# Patient Record
Sex: FEMALE | Race: WHITE | Employment: FULL TIME | URBAN - METROPOLITAN AREA
[De-identification: names, ages, dates, MRNs, and addresses within clinical notes are randomized per-mention and may not be internally consistent; named-entity substitution may affect disease eponyms.]

---

## 2017-11-20 ENCOUNTER — OFFICE VISIT (OUTPATIENT)
Dept: OBGYN | Age: 47
End: 2017-11-20

## 2017-11-20 VITALS
DIASTOLIC BLOOD PRESSURE: 74 MMHG | SYSTOLIC BLOOD PRESSURE: 124 MMHG | BODY MASS INDEX: 26.98 KG/M2 | HEIGHT: 64 IN | WEIGHT: 158 LBS

## 2017-11-20 DIAGNOSIS — Z11.51 SPECIAL SCREENING EXAMINATION FOR HUMAN PAPILLOMAVIRUS (HPV): ICD-10-CM

## 2017-11-20 DIAGNOSIS — Z12.31 SCREENING MAMMOGRAM, ENCOUNTER FOR: ICD-10-CM

## 2017-11-20 DIAGNOSIS — Z01.419 WOMEN'S ANNUAL ROUTINE GYNECOLOGICAL EXAMINATION: Primary | ICD-10-CM

## 2017-11-20 PROCEDURE — G8419 CALC BMI OUT NRM PARAM NOF/U: HCPCS | Performed by: OBSTETRICS & GYNECOLOGY

## 2017-11-20 PROCEDURE — 4004F PT TOBACCO SCREEN RCVD TLK: CPT | Performed by: OBSTETRICS & GYNECOLOGY

## 2017-11-20 PROCEDURE — 99396 PREV VISIT EST AGE 40-64: CPT | Performed by: OBSTETRICS & GYNECOLOGY

## 2017-11-20 PROCEDURE — G8427 DOCREV CUR MEDS BY ELIG CLIN: HCPCS | Performed by: OBSTETRICS & GYNECOLOGY

## 2017-11-20 PROCEDURE — G8484 FLU IMMUNIZE NO ADMIN: HCPCS | Performed by: OBSTETRICS & GYNECOLOGY

## 2017-11-20 PROCEDURE — 99212 OFFICE O/P EST SF 10 MIN: CPT | Performed by: OBSTETRICS & GYNECOLOGY

## 2017-11-20 ASSESSMENT — ENCOUNTER SYMPTOMS
RECTAL PAIN: 0
DIARRHEA: 0
RESPIRATORY NEGATIVE: 1
ABDOMINAL DISTENTION: 0
EYES NEGATIVE: 1
ALLERGIC/IMMUNOLOGIC NEGATIVE: 1
NAUSEA: 0
VOMITING: 0
ABDOMINAL PAIN: 0
CONSTIPATION: 0
ANAL BLEEDING: 0
BLOOD IN STOOL: 0

## 2017-11-20 NOTE — PROGRESS NOTES
The patient was asked if she would like a chaperone present for her intimate exam. She  declines the chaperone.  Hardik

## 2017-11-21 NOTE — PROGRESS NOTES
Subjective:      Patient ID: Mikey Godfrey is a 52 y.o. female    Annual exam.  No GYN complaints. Normal cycles for perimenopause. Pap performed and screening mammogram ordered. STD screening offered. Monthly SBE encouraged. F/U annual or prn. Vitals:  /74   Ht 5' 4\" (1.626 m)   Wt 158 lb (71.7 kg)   LMP 2017   BMI 27.12 kg/m²   Past Medical History:   Diagnosis Date    Diverticular disease      Past Surgical History:   Procedure Laterality Date    DILATION AND CURETTAGE       Allergies:  Sulfa antibiotics  No current outpatient prescriptions on file. No current facility-administered medications for this visit. Social History     Social History    Marital status: Single     Spouse name: N/A    Number of children: N/A    Years of education: N/A     Occupational History    Not on file. Social History Main Topics    Smoking status: Current Every Day Smoker    Smokeless tobacco: Never Used    Alcohol use Yes      Comment: socially    Drug use: No    Sexual activity: Not Currently     Other Topics Concern    Not on file     Social History Narrative    No narrative on file     Family History   Problem Relation Age of Onset    No Known Problems Father     No Known Problems Mother     No Known Problems Paternal Grandfather     No Known Problems Paternal Grandmother     No Known Problems Maternal Grandmother     No Known Problems Maternal Grandfather     No Known Problems Brother     No Known Problems Sister     No Known Problems Other     Breast Cancer Neg Hx     Cancer Neg Hx     Colon Cancer Neg Hx     Diabetes Neg Hx     Eclampsia Neg Hx     Hypertension Neg Hx     Ovarian Cancer Neg Hx      Labor Neg Hx     Spont Abortions Neg Hx     Stroke Neg Hx        Review of Systems   Constitutional: Negative. Negative for activity change, appetite change, chills, diaphoresis, fatigue, fever and unexpected weight change. HENT: Negative.     Eyes: Negative. Respiratory: Negative. Cardiovascular: Negative. Gastrointestinal: Negative for abdominal distention, abdominal pain, anal bleeding, blood in stool, constipation, diarrhea, nausea, rectal pain and vomiting. Endocrine: Negative. Genitourinary: Negative for decreased urine volume, difficulty urinating, dyspareunia, dysuria, enuresis, flank pain, frequency, genital sores, hematuria, menstrual problem, pelvic pain, urgency, vaginal bleeding, vaginal discharge and vaginal pain. Musculoskeletal: Negative. Skin: Negative. Allergic/Immunologic: Negative. Neurological: Negative. Hematological: Negative. Psychiatric/Behavioral: Negative. Objective:     Physical Exam   Constitutional: She is oriented to person, place, and time. She appears well-developed and well-nourished. HENT:   Head: Normocephalic. Neck: Normal range of motion. Neck supple. No thyromegaly present. Cardiovascular: Normal rate, regular rhythm and normal heart sounds. Pulmonary/Chest: Effort normal and breath sounds normal. No respiratory distress. She has no wheezes. She has no rales. She exhibits no tenderness. Abdominal: Soft. Bowel sounds are normal. She exhibits no distension and no mass. There is no tenderness. There is no rebound and no guarding. Hernia confirmed negative in the right inguinal area and confirmed negative in the left inguinal area. Genitourinary: Rectum normal, vagina normal and uterus normal. No breast swelling, tenderness, discharge or bleeding. No labial fusion. There is no rash, tenderness, lesion or injury on the right labia. There is no rash, tenderness, lesion or injury on the left labia. Uterus is not deviated, not enlarged, not fixed and not tender. Cervix exhibits no motion tenderness, no discharge and no friability. Right adnexum displays no mass, no tenderness and no fullness. Left adnexum displays no mass, no tenderness and no fullness.  No erythema, tenderness or bleeding in the vagina. No foreign body in the vagina. No signs of injury around the vagina. No vaginal discharge found. Musculoskeletal: Normal range of motion. She exhibits no edema or tenderness. Lymphadenopathy:     She has no cervical adenopathy. Right: No inguinal adenopathy present. Left: No inguinal adenopathy present. Neurological: She is alert and oriented to person, place, and time. Skin: Skin is warm and dry. No rash noted. No erythema. No pallor. Psychiatric: She has a normal mood and affect. Her behavior is normal. Judgment and thought content normal.       Assessment:      1. Women's annual routine gynecological examination  PAP SMEAR   2. Special screening examination for human papillomavirus (HPV)  PAP SMEAR   3. Screening mammogram, encounter for  KARLA DIGITAL SCREEN W OR WO CAD BILATERAL         Plan:      Medications placed this encounter:  No orders of the defined types were placed in this encounter. Orders placed this encounter:  Orders Placed This Encounter   Procedures    KARLA DIGITAL SCREEN W OR WO CAD BILATERAL     Standing Status:   Future     Standing Expiration Date:   1/20/2019    PAP SMEAR     Standing Status:   Future     Standing Expiration Date:   11/20/2018     Order Specific Question:   Collection Type     Answer:   SurePath     Order Specific Question:   Prior Abnormal Pap Test     Answer:   No     Order Specific Question:   Screening or Diagnostic     Answer:   Screening     Order Specific Question:   HPV Requested? Answer:   HPV 16/18     Order Specific Question:   High Risk Patient     Answer:   N/A         Follow up:  1 year annual or prn.

## 2017-11-29 ENCOUNTER — HOSPITAL ENCOUNTER (OUTPATIENT)
Dept: WOMENS IMAGING | Age: 47
Discharge: HOME | End: 2017-11-29
Payer: COMMERCIAL

## 2017-11-29 DIAGNOSIS — Z12.31 SCREENING MAMMOGRAM, ENCOUNTER FOR: ICD-10-CM

## 2017-11-29 PROCEDURE — G0202 SCR MAMMO BI INCL CAD: HCPCS

## 2017-11-30 DIAGNOSIS — Z11.51 SPECIAL SCREENING EXAMINATION FOR HUMAN PAPILLOMAVIRUS (HPV): ICD-10-CM

## 2017-11-30 DIAGNOSIS — Z01.419 WOMEN'S ANNUAL ROUTINE GYNECOLOGICAL EXAMINATION: ICD-10-CM

## 2017-12-01 ENCOUNTER — TELEPHONE (OUTPATIENT)
Dept: OBGYN | Age: 47
End: 2017-12-01

## 2017-12-05 ENCOUNTER — OFFICE VISIT (OUTPATIENT)
Dept: OBGYN | Age: 47
End: 2017-12-05

## 2017-12-05 VITALS
SYSTOLIC BLOOD PRESSURE: 128 MMHG | BODY MASS INDEX: 26.63 KG/M2 | DIASTOLIC BLOOD PRESSURE: 72 MMHG | WEIGHT: 156 LBS | HEIGHT: 64 IN

## 2017-12-05 DIAGNOSIS — R87.618 UNEXPLAINED ENDOMETRIAL CELLS ON CERVICAL PAP SMEAR: Primary | ICD-10-CM

## 2017-12-05 PROCEDURE — 99213 OFFICE O/P EST LOW 20 MIN: CPT | Performed by: OBSTETRICS & GYNECOLOGY

## 2017-12-05 PROCEDURE — 4004F PT TOBACCO SCREEN RCVD TLK: CPT | Performed by: OBSTETRICS & GYNECOLOGY

## 2017-12-05 PROCEDURE — G8484 FLU IMMUNIZE NO ADMIN: HCPCS | Performed by: OBSTETRICS & GYNECOLOGY

## 2017-12-05 PROCEDURE — G8419 CALC BMI OUT NRM PARAM NOF/U: HCPCS | Performed by: OBSTETRICS & GYNECOLOGY

## 2017-12-05 PROCEDURE — G8428 CUR MEDS NOT DOCUMENT: HCPCS | Performed by: OBSTETRICS & GYNECOLOGY

## 2017-12-05 NOTE — PATIENT INSTRUCTIONS
ENDOSEE - OFFICE HYSTEROSCOPY    Endosee Office Hysteroscopy is a diagnostic tool that lets your doctor see the inside of your uterus quickly and easily right in her office, without the need for general anesthesia in an operating room. Frandy Chin helps find the cause of several common issues women face such as abnormal bleeding and infertility concerns. Before using Endosee, your doctor will need to look at your health history, but most women are able to have the procedure without a problem. Do I need to do anything prior to my Endosee? 1. No unprotected intercourse for 3 weeks prior to procedure  2. Take 800mg of Motrin 1 hour prior to your procedure  3. If you start your period, you can still have the Endosee done if your bleeding is very light. If your cycle is heavy please call to reschedule. 4.  Avoid vaginal medications or creams & douching for 24 hours before the procedure. 5. Please come prepared to leave a urine sample prior to your procedure. What can I expect when I go home? 1. You may have some spotting or light discharge for up to 24 hours. 2.  You can resume all normal activities. 3.  The procedure may start your period. *If a biopsy is done, you will need to schedule a follow up appointment for 2 weeks following your procedure to get your results from the physician.

## 2017-12-06 ASSESSMENT — ENCOUNTER SYMPTOMS
EYES NEGATIVE: 1
VOMITING: 0
ALLERGIC/IMMUNOLOGIC NEGATIVE: 1
NAUSEA: 0
BLOOD IN STOOL: 0
ABDOMINAL PAIN: 0
DIARRHEA: 0
ABDOMINAL DISTENTION: 0
RESPIRATORY NEGATIVE: 1
CONSTIPATION: 0
RECTAL PAIN: 0
ANAL BLEEDING: 0

## 2017-12-06 NOTE — PROGRESS NOTES
 Hypertension Neg Hx     Ovarian Cancer Neg Hx      Labor Neg Hx     Spont Abortions Neg Hx     Stroke Neg Hx        Review of Systems   Constitutional: Negative. Negative for activity change, appetite change, chills, diaphoresis, fatigue, fever and unexpected weight change. HENT: Negative. Eyes: Negative. Respiratory: Negative. Cardiovascular: Negative. Gastrointestinal: Negative for abdominal distention, abdominal pain, anal bleeding, blood in stool, constipation, diarrhea, nausea, rectal pain and vomiting. Endocrine: Negative. Genitourinary: Negative for decreased urine volume, difficulty urinating, dyspareunia, dysuria, enuresis, flank pain, frequency, genital sores, hematuria, menstrual problem, pelvic pain, urgency, vaginal bleeding, vaginal discharge and vaginal pain. Musculoskeletal: Negative. Skin: Negative. Allergic/Immunologic: Negative. Neurological: Negative. Hematological: Negative. Psychiatric/Behavioral: Negative. Objective:     Physical Exam   Constitutional: She is oriented to person, place, and time. She appears well-developed and well-nourished. No distress. HENT:   Head: Normocephalic and atraumatic. Eyes: Conjunctivae are normal.   Neck: Normal range of motion. Neck supple. Cardiovascular: Normal rate and regular rhythm. Pulmonary/Chest: Effort normal. No respiratory distress. Musculoskeletal: Normal range of motion. She exhibits no edema, tenderness or deformity. Neurological: She is alert and oriented to person, place, and time. She exhibits normal muscle tone. Coordination normal.   Skin: Skin is warm and dry. She is not diaphoretic. No pallor. Psychiatric: She has a normal mood and affect. Her behavior is normal. Judgment and thought content normal.       Assessment:      1.  Unexplained endometrial cells on cervical Pap smear           Plan:      Medications placed this encounter:  No orders of the defined types were placed in this encounter. Orders placed this encounter:  No orders of the defined types were placed in this encounter. Follow up:  Return for Endosee w/ bx.

## 2018-02-05 ENCOUNTER — PROCEDURE VISIT (OUTPATIENT)
Dept: OBGYN CLINIC | Age: 48
End: 2018-02-05
Payer: COMMERCIAL

## 2018-02-05 VITALS
BODY MASS INDEX: 27.14 KG/M2 | DIASTOLIC BLOOD PRESSURE: 72 MMHG | HEIGHT: 64 IN | SYSTOLIC BLOOD PRESSURE: 128 MMHG | WEIGHT: 159 LBS

## 2018-02-05 DIAGNOSIS — R87.618 UNEXPLAINED ENDOMETRIAL CELLS ON CERVICAL PAP SMEAR: Primary | ICD-10-CM

## 2018-02-05 LAB
CONTROL: NORMAL
PREGNANCY TEST URINE, POC: NORMAL

## 2018-02-05 PROCEDURE — 58558 HYSTEROSCOPY BIOPSY: CPT | Performed by: OBSTETRICS & GYNECOLOGY

## 2018-02-05 PROCEDURE — 81025 URINE PREGNANCY TEST: CPT | Performed by: OBSTETRICS & GYNECOLOGY

## 2018-02-05 PROCEDURE — 99999 PR OFFICE/OUTPT VISIT,PROCEDURE ONLY: CPT | Performed by: OBSTETRICS & GYNECOLOGY

## 2018-02-05 ASSESSMENT — PATIENT HEALTH QUESTIONNAIRE - PHQ9
SUM OF ALL RESPONSES TO PHQ QUESTIONS 1-9: 0
SUM OF ALL RESPONSES TO PHQ9 QUESTIONS 1 & 2: 0
1. LITTLE INTEREST OR PLEASURE IN DOING THINGS: 0
2. FEELING DOWN, DEPRESSED OR HOPELESS: 0

## 2018-02-20 ENCOUNTER — OFFICE VISIT (OUTPATIENT)
Dept: OBGYN CLINIC | Age: 48
End: 2018-02-20
Payer: COMMERCIAL

## 2018-02-20 VITALS — BODY MASS INDEX: 26.95 KG/M2 | WEIGHT: 157 LBS | SYSTOLIC BLOOD PRESSURE: 122 MMHG | DIASTOLIC BLOOD PRESSURE: 68 MMHG

## 2018-02-20 DIAGNOSIS — Z09 FOLLOW UP: ICD-10-CM

## 2018-02-20 DIAGNOSIS — R87.619 ENDOMETRIAL CELLS ON CERVICAL PAP SMEAR INCONSISTENT W/LMP: Primary | ICD-10-CM

## 2018-02-20 PROCEDURE — 4004F PT TOBACCO SCREEN RCVD TLK: CPT | Performed by: OBSTETRICS & GYNECOLOGY

## 2018-02-20 PROCEDURE — G8484 FLU IMMUNIZE NO ADMIN: HCPCS | Performed by: OBSTETRICS & GYNECOLOGY

## 2018-02-20 PROCEDURE — G8419 CALC BMI OUT NRM PARAM NOF/U: HCPCS | Performed by: OBSTETRICS & GYNECOLOGY

## 2018-02-20 PROCEDURE — G8427 DOCREV CUR MEDS BY ELIG CLIN: HCPCS | Performed by: OBSTETRICS & GYNECOLOGY

## 2018-02-20 PROCEDURE — 99213 OFFICE O/P EST LOW 20 MIN: CPT | Performed by: OBSTETRICS & GYNECOLOGY

## 2018-02-20 ASSESSMENT — ENCOUNTER SYMPTOMS
ABDOMINAL PAIN: 0
RESPIRATORY NEGATIVE: 1
DIARRHEA: 0
ANAL BLEEDING: 0
VOMITING: 0
CONSTIPATION: 0
RECTAL PAIN: 0
ABDOMINAL DISTENTION: 0
BLOOD IN STOOL: 0
NAUSEA: 0
ALLERGIC/IMMUNOLOGIC NEGATIVE: 1
EYES NEGATIVE: 1

## 2018-02-21 NOTE — PROGRESS NOTES
activity change, appetite change, chills, diaphoresis, fatigue, fever and unexpected weight change. HENT: Negative. Eyes: Negative. Respiratory: Negative. Cardiovascular: Negative. Gastrointestinal: Negative for abdominal distention, abdominal pain, anal bleeding, blood in stool, constipation, diarrhea, nausea, rectal pain and vomiting. Endocrine: Negative. Genitourinary: Negative for decreased urine volume, difficulty urinating, dyspareunia, dysuria, enuresis, flank pain, frequency, genital sores, hematuria, menstrual problem, pelvic pain, urgency, vaginal bleeding, vaginal discharge and vaginal pain. Musculoskeletal: Negative. Skin: Negative. Allergic/Immunologic: Negative. Neurological: Negative. Hematological: Negative. Psychiatric/Behavioral: Negative. Objective:     Physical Exam   Constitutional: She is oriented to person, place, and time. She appears well-developed and well-nourished. No distress. HENT:   Head: Normocephalic and atraumatic. Eyes: Conjunctivae are normal.   Neck: Normal range of motion. Neck supple. Cardiovascular: Normal rate and regular rhythm. Pulmonary/Chest: Effort normal. No respiratory distress. Musculoskeletal: Normal range of motion. She exhibits no edema, tenderness or deformity. Neurological: She is alert and oriented to person, place, and time. She exhibits normal muscle tone. Coordination normal.   Skin: Skin is warm and dry. She is not diaphoretic. No pallor. Psychiatric: She has a normal mood and affect. Her behavior is normal. Judgment and thought content normal.       Assessment:      1. Endometrial cells on cervical Pap smear inconsistent w/LMP     2. Follow up           Plan:      Medications placed this encounter:  No orders of the defined types were placed in this encounter. Orders placed this encounter:  No orders of the defined types were placed in this encounter.         Follow up:  Return in about 1 year

## 2018-11-26 ENCOUNTER — OFFICE VISIT (OUTPATIENT)
Dept: OBGYN CLINIC | Age: 48
End: 2018-11-26
Payer: COMMERCIAL

## 2018-11-26 VITALS
DIASTOLIC BLOOD PRESSURE: 78 MMHG | HEIGHT: 64 IN | SYSTOLIC BLOOD PRESSURE: 120 MMHG | WEIGHT: 153 LBS | BODY MASS INDEX: 26.12 KG/M2

## 2018-11-26 DIAGNOSIS — Z12.31 SCREENING MAMMOGRAM, ENCOUNTER FOR: ICD-10-CM

## 2018-11-26 DIAGNOSIS — Z11.51 SPECIAL SCREENING EXAMINATION FOR HUMAN PAPILLOMAVIRUS (HPV): ICD-10-CM

## 2018-11-26 DIAGNOSIS — Z01.419 WOMEN'S ANNUAL ROUTINE GYNECOLOGICAL EXAMINATION: Primary | ICD-10-CM

## 2018-11-26 DIAGNOSIS — N92.1 METRORRHAGIA: ICD-10-CM

## 2018-11-26 PROCEDURE — 99396 PREV VISIT EST AGE 40-64: CPT | Performed by: OBSTETRICS & GYNECOLOGY

## 2018-11-26 PROCEDURE — G8484 FLU IMMUNIZE NO ADMIN: HCPCS | Performed by: OBSTETRICS & GYNECOLOGY

## 2018-11-26 PROCEDURE — G8427 DOCREV CUR MEDS BY ELIG CLIN: HCPCS | Performed by: OBSTETRICS & GYNECOLOGY

## 2018-11-26 PROCEDURE — 99213 OFFICE O/P EST LOW 20 MIN: CPT | Performed by: OBSTETRICS & GYNECOLOGY

## 2018-11-26 PROCEDURE — 4004F PT TOBACCO SCREEN RCVD TLK: CPT | Performed by: OBSTETRICS & GYNECOLOGY

## 2018-11-26 PROCEDURE — G8419 CALC BMI OUT NRM PARAM NOF/U: HCPCS | Performed by: OBSTETRICS & GYNECOLOGY

## 2018-11-26 ASSESSMENT — ENCOUNTER SYMPTOMS
RECTAL PAIN: 0
CONSTIPATION: 0
RESPIRATORY NEGATIVE: 1
ALLERGIC/IMMUNOLOGIC NEGATIVE: 1
ABDOMINAL DISTENTION: 0
VOMITING: 0
ANAL BLEEDING: 0
BLOOD IN STOOL: 0
EYES NEGATIVE: 1
NAUSEA: 0
DIARRHEA: 0
ABDOMINAL PAIN: 0

## 2018-11-26 NOTE — PROGRESS NOTES
Diabetes Neg Hx     Eclampsia Neg Hx     Hypertension Neg Hx     Ovarian Cancer Neg Hx      Labor Neg Hx     Spont Abortions Neg Hx     Stroke Neg Hx        Review of Systems   Constitutional: Negative. Negative for activity change, appetite change, chills, diaphoresis, fatigue, fever and unexpected weight change. HENT: Negative. Eyes: Negative. Respiratory: Negative. Cardiovascular: Negative. Gastrointestinal: Negative for abdominal distention, abdominal pain, anal bleeding, blood in stool, constipation, diarrhea, nausea, rectal pain and vomiting. Endocrine: Negative. Genitourinary: Positive for menstrual problem (Irregular cycle). Negative for decreased urine volume, difficulty urinating, dyspareunia, dysuria, enuresis, flank pain, frequency, genital sores, hematuria, pelvic pain, urgency, vaginal bleeding, vaginal discharge and vaginal pain. Musculoskeletal: Negative. Skin: Negative. Allergic/Immunologic: Negative. Neurological: Negative. Hematological: Negative. Psychiatric/Behavioral: Negative. Objective:     Physical Exam   Constitutional: She is oriented to person, place, and time. She appears well-developed and well-nourished. HENT:   Head: Normocephalic. Neck: Normal range of motion. Neck supple. No thyromegaly present. Cardiovascular: Normal rate, regular rhythm and normal heart sounds. Pulmonary/Chest: Effort normal and breath sounds normal. No respiratory distress. She has no wheezes. She has no rales. She exhibits no tenderness. Abdominal: Soft. Bowel sounds are normal. She exhibits no distension and no mass. There is no tenderness. There is no rebound and no guarding. Hernia confirmed negative in the right inguinal area and confirmed negative in the left inguinal area. Genitourinary: Rectum normal, vagina normal and uterus normal. No breast tenderness, discharge or bleeding. No labial fusion.  There is no rash, tenderness, lesion or

## 2018-12-03 DIAGNOSIS — Z01.419 WOMEN'S ANNUAL ROUTINE GYNECOLOGICAL EXAMINATION: ICD-10-CM

## 2018-12-03 DIAGNOSIS — Z11.51 SPECIAL SCREENING EXAMINATION FOR HUMAN PAPILLOMAVIRUS (HPV): ICD-10-CM

## 2018-12-20 ENCOUNTER — OFFICE VISIT (OUTPATIENT)
Dept: FAMILY MEDICINE CLINIC | Age: 48
End: 2018-12-20
Payer: COMMERCIAL

## 2018-12-20 VITALS
TEMPERATURE: 97.5 F | WEIGHT: 151.6 LBS | SYSTOLIC BLOOD PRESSURE: 138 MMHG | HEIGHT: 64 IN | RESPIRATION RATE: 14 BRPM | OXYGEN SATURATION: 99 % | HEART RATE: 91 BPM | DIASTOLIC BLOOD PRESSURE: 82 MMHG | BODY MASS INDEX: 25.88 KG/M2

## 2018-12-20 DIAGNOSIS — J01.00 ACUTE NON-RECURRENT MAXILLARY SINUSITIS: Primary | ICD-10-CM

## 2018-12-20 DIAGNOSIS — R06.2 EXPIRATORY WHEEZING: ICD-10-CM

## 2018-12-20 DIAGNOSIS — R05.9 COUGH: ICD-10-CM

## 2018-12-20 DIAGNOSIS — R09.82 PND (POST-NASAL DRIP): ICD-10-CM

## 2018-12-20 PROCEDURE — G8419 CALC BMI OUT NRM PARAM NOF/U: HCPCS | Performed by: PHYSICIAN ASSISTANT

## 2018-12-20 PROCEDURE — G8484 FLU IMMUNIZE NO ADMIN: HCPCS | Performed by: PHYSICIAN ASSISTANT

## 2018-12-20 PROCEDURE — 99213 OFFICE O/P EST LOW 20 MIN: CPT | Performed by: PHYSICIAN ASSISTANT

## 2018-12-20 PROCEDURE — G8427 DOCREV CUR MEDS BY ELIG CLIN: HCPCS | Performed by: PHYSICIAN ASSISTANT

## 2018-12-20 PROCEDURE — 4004F PT TOBACCO SCREEN RCVD TLK: CPT | Performed by: PHYSICIAN ASSISTANT

## 2018-12-20 RX ORDER — AMOXICILLIN AND CLAVULANATE POTASSIUM 875; 125 MG/1; MG/1
1 TABLET, FILM COATED ORAL 2 TIMES DAILY
Qty: 20 TABLET | Refills: 0 | Status: SHIPPED | OUTPATIENT
Start: 2018-12-20 | End: 2018-12-30

## 2018-12-20 RX ORDER — PREDNISONE 20 MG/1
40 TABLET ORAL DAILY
Qty: 10 TABLET | Refills: 0 | Status: SHIPPED | OUTPATIENT
Start: 2018-12-20 | End: 2018-12-25

## 2018-12-20 RX ORDER — ALBUTEROL SULFATE 90 UG/1
2 AEROSOL, METERED RESPIRATORY (INHALATION) 2 TIMES DAILY
Qty: 2 INHALER | Refills: 0 | Status: SHIPPED | OUTPATIENT
Start: 2018-12-20 | End: 2019-03-21

## 2018-12-20 RX ORDER — GUAIFENESIN 600 MG/1
600 TABLET, EXTENDED RELEASE ORAL 2 TIMES DAILY
Qty: 30 TABLET | Refills: 0 | Status: SHIPPED | OUTPATIENT
Start: 2018-12-20 | End: 2019-01-04

## 2018-12-21 ASSESSMENT — ENCOUNTER SYMPTOMS
SINUS PRESSURE: 1
FACIAL SWELLING: 0
SINUS PAIN: 1
COUGH: 1
WHEEZING: 1
CHEST TIGHTNESS: 1
TROUBLE SWALLOWING: 0

## 2019-01-14 ENCOUNTER — HOSPITAL ENCOUNTER (OUTPATIENT)
Dept: WOMENS IMAGING | Age: 49
Discharge: HOME | End: 2019-01-16
Payer: COMMERCIAL

## 2019-01-14 DIAGNOSIS — Z12.31 SCREENING MAMMOGRAM, ENCOUNTER FOR: ICD-10-CM

## 2019-01-14 PROCEDURE — 77067 SCR MAMMO BI INCL CAD: CPT

## 2019-03-21 ENCOUNTER — OFFICE VISIT (OUTPATIENT)
Dept: FAMILY MEDICINE CLINIC | Age: 49
End: 2019-03-21
Payer: COMMERCIAL

## 2019-03-21 VITALS
HEART RATE: 99 BPM | RESPIRATION RATE: 14 BRPM | HEIGHT: 64 IN | BODY MASS INDEX: 23.9 KG/M2 | WEIGHT: 140 LBS | OXYGEN SATURATION: 100 % | TEMPERATURE: 98 F | DIASTOLIC BLOOD PRESSURE: 88 MMHG | SYSTOLIC BLOOD PRESSURE: 138 MMHG

## 2019-03-21 DIAGNOSIS — Z72.0 TOBACCO ABUSE: ICD-10-CM

## 2019-03-21 DIAGNOSIS — R10.84 GENERALIZED ABDOMINAL PAIN: ICD-10-CM

## 2019-03-21 DIAGNOSIS — K29.00 ACUTE GASTRITIS, PRESENCE OF BLEEDING UNSPECIFIED, UNSPECIFIED GASTRITIS TYPE: ICD-10-CM

## 2019-03-21 DIAGNOSIS — N93.8 DUB (DYSFUNCTIONAL UTERINE BLEEDING): ICD-10-CM

## 2019-03-21 DIAGNOSIS — Z13.220 SCREENING FOR HYPERLIPIDEMIA: ICD-10-CM

## 2019-03-21 DIAGNOSIS — D25.9 UTERINE LEIOMYOMA, UNSPECIFIED LOCATION: ICD-10-CM

## 2019-03-21 DIAGNOSIS — R21 RASH AND NONSPECIFIC SKIN ERUPTION: ICD-10-CM

## 2019-03-21 DIAGNOSIS — N92.6 IRREGULAR PERIODS: Primary | ICD-10-CM

## 2019-03-21 PROCEDURE — G8427 DOCREV CUR MEDS BY ELIG CLIN: HCPCS | Performed by: PHYSICIAN ASSISTANT

## 2019-03-21 PROCEDURE — 99215 OFFICE O/P EST HI 40 MIN: CPT | Performed by: PHYSICIAN ASSISTANT

## 2019-03-21 PROCEDURE — G8484 FLU IMMUNIZE NO ADMIN: HCPCS | Performed by: PHYSICIAN ASSISTANT

## 2019-03-21 PROCEDURE — 4004F PT TOBACCO SCREEN RCVD TLK: CPT | Performed by: PHYSICIAN ASSISTANT

## 2019-03-21 PROCEDURE — G8420 CALC BMI NORM PARAMETERS: HCPCS | Performed by: PHYSICIAN ASSISTANT

## 2019-03-21 RX ORDER — CLOTRIMAZOLE AND BETAMETHASONE DIPROPIONATE 10; .64 MG/G; MG/G
CREAM TOPICAL
Qty: 45 G | Refills: 0 | Status: SHIPPED | OUTPATIENT
Start: 2019-03-21 | End: 2021-01-14

## 2019-03-21 RX ORDER — ONDANSETRON 4 MG/1
TABLET, FILM COATED ORAL
Refills: 0 | COMMUNITY
Start: 2019-01-28 | End: 2019-03-21

## 2019-03-21 RX ORDER — SUCRALFATE 1 G/1
1 TABLET ORAL 4 TIMES DAILY
Qty: 120 TABLET | Refills: 3 | Status: SHIPPED | OUTPATIENT
Start: 2019-03-21 | End: 2020-01-13 | Stop reason: CLARIF

## 2019-03-21 ASSESSMENT — ENCOUNTER SYMPTOMS
BLOOD IN STOOL: 0
SINUS PAIN: 0
TROUBLE SWALLOWING: 0
COUGH: 0
NAUSEA: 1
VOMITING: 0
RECTAL PAIN: 0
SINUS PRESSURE: 0
WHEEZING: 0
CHEST TIGHTNESS: 0
ABDOMINAL PAIN: 1
SHORTNESS OF BREATH: 0
DIARRHEA: 0
ANAL BLEEDING: 0
CONSTIPATION: 0
ABDOMINAL DISTENTION: 1
BACK PAIN: 0

## 2019-03-21 ASSESSMENT — PATIENT HEALTH QUESTIONNAIRE - PHQ9
SUM OF ALL RESPONSES TO PHQ9 QUESTIONS 1 & 2: 0
SUM OF ALL RESPONSES TO PHQ QUESTIONS 1-9: 0
2. FEELING DOWN, DEPRESSED OR HOPELESS: 0
1. LITTLE INTEREST OR PLEASURE IN DOING THINGS: 0
SUM OF ALL RESPONSES TO PHQ QUESTIONS 1-9: 0

## 2019-04-01 ENCOUNTER — TELEPHONE (OUTPATIENT)
Dept: FAMILY MEDICINE CLINIC | Age: 49
End: 2019-04-01

## 2019-04-01 DIAGNOSIS — D25.9 UTERINE LEIOMYOMA, UNSPECIFIED LOCATION: ICD-10-CM

## 2019-04-01 DIAGNOSIS — N92.6 IRREGULAR PERIODS: Primary | ICD-10-CM

## 2019-04-01 NOTE — TELEPHONE ENCOUNTER
Patient called to advise that her ultrasound was cancelled. She was told that there was something wrong with the order. I called scheduling and Robert Brewster is the one who cancelled the order and she will call back to advise why.

## 2019-04-07 ENCOUNTER — HOSPITAL ENCOUNTER (OUTPATIENT)
Dept: ULTRASOUND IMAGING | Age: 49
Discharge: HOME | End: 2019-04-09
Payer: COMMERCIAL

## 2019-04-07 DIAGNOSIS — N92.6 IRREGULAR PERIODS: ICD-10-CM

## 2019-04-07 DIAGNOSIS — D25.9 UTERINE LEIOMYOMA, UNSPECIFIED LOCATION: ICD-10-CM

## 2019-04-07 DIAGNOSIS — N93.8 DUB (DYSFUNCTIONAL UTERINE BLEEDING): ICD-10-CM

## 2019-04-07 PROCEDURE — 76856 US EXAM PELVIC COMPLETE: CPT

## 2019-04-07 PROCEDURE — 76830 TRANSVAGINAL US NON-OB: CPT

## 2019-04-17 ENCOUNTER — OFFICE VISIT (OUTPATIENT)
Dept: OBGYN CLINIC | Age: 49
End: 2019-04-17
Payer: COMMERCIAL

## 2019-04-17 VITALS
SYSTOLIC BLOOD PRESSURE: 134 MMHG | DIASTOLIC BLOOD PRESSURE: 84 MMHG | BODY MASS INDEX: 23.56 KG/M2 | HEIGHT: 64 IN | WEIGHT: 138 LBS

## 2019-04-17 DIAGNOSIS — N92.6 IRREGULAR MENSTRUAL CYCLE: ICD-10-CM

## 2019-04-17 DIAGNOSIS — D25.9 UTERINE LEIOMYOMA, UNSPECIFIED LOCATION: Primary | ICD-10-CM

## 2019-04-17 PROCEDURE — 4004F PT TOBACCO SCREEN RCVD TLK: CPT | Performed by: OBSTETRICS & GYNECOLOGY

## 2019-04-17 PROCEDURE — G8420 CALC BMI NORM PARAMETERS: HCPCS | Performed by: OBSTETRICS & GYNECOLOGY

## 2019-04-17 PROCEDURE — 99213 OFFICE O/P EST LOW 20 MIN: CPT | Performed by: OBSTETRICS & GYNECOLOGY

## 2019-04-17 PROCEDURE — G8427 DOCREV CUR MEDS BY ELIG CLIN: HCPCS | Performed by: OBSTETRICS & GYNECOLOGY

## 2019-04-17 ASSESSMENT — ENCOUNTER SYMPTOMS
NAUSEA: 0
EYES NEGATIVE: 1
CONSTIPATION: 0
RESPIRATORY NEGATIVE: 1
ABDOMINAL PAIN: 0
RECTAL PAIN: 0
VOMITING: 0
DIARRHEA: 0
BLOOD IN STOOL: 0
ANAL BLEEDING: 0
ABDOMINAL DISTENTION: 0
ALLERGIC/IMMUNOLOGIC NEGATIVE: 1

## 2019-04-17 NOTE — PROGRESS NOTES
Patient here to review US done by PCP for irregular cycles and \" not feeling well\". US with normal size uterus, normal endometrial ECHO for menstruating female, Normal ovaries B/L, one very small fibroid ( < 2 cm ). Overall US normal.  Patient had DUB w/u 2/18 and all negative, including EMB. States she had more request cycles 11/18-2/19, but last several cycles normal q 30 days. Typical perimenopausal pattern. States her main complaint with PCP was general malaise with 15+# weight loss over last year ( non-intentional ) and leg pain. Discussed non of these sx should be caused by a current GYN etiology and encouraged to see GI and F/U with PCP. All questions answered. F/U annual exam or prn 11/19. Pt was seen with total face to face time of 15 minutes with more than 50% of the visit being counseling and education regarding encounter dx of small fibroid on US. See discussion /counseling details as stated above. Vitals:  /84   Ht 5' 4\" (1.626 m)   Wt 138 lb (62.6 kg)   LMP 03/21/2019   BMI 23.69 kg/m²   Past Medical History:   Diagnosis Date    Abnormal Pap smear of cervix     Diverticular disease     Diverticulitis      Past Surgical History:   Procedure Laterality Date    DILATION AND CURETTAGE       Allergies:  Sulfa antibiotics  Current Outpatient Medications   Medication Sig Dispense Refill    clotrimazole-betamethasone (LOTRISONE) 1-0.05 % cream Apply topically 2 times daily. 45 g 0    sucralfate (CARAFATE) 1 GM tablet Take 1 tablet by mouth 4 times daily 120 tablet 3     No current facility-administered medications for this visit.       Social History     Socioeconomic History    Marital status: Single     Spouse name: Not on file    Number of children: Not on file    Years of education: Not on file    Highest education level: Not on file   Occupational History    Not on file   Social Needs    Financial resource strain: Not on file    Food insecurity:     Worry: Not on file     Inability: Not on file    Transportation needs:     Medical: Not on file     Non-medical: Not on file   Tobacco Use    Smoking status: Current Every Day Smoker     Packs/day: 0.25    Smokeless tobacco: Never Used   Substance and Sexual Activity    Alcohol use: Yes     Comment: socially    Drug use: No    Sexual activity: Not Currently   Lifestyle    Physical activity:     Days per week: Not on file     Minutes per session: Not on file    Stress: Not on file   Relationships    Social connections:     Talks on phone: Not on file     Gets together: Not on file     Attends Buddhist service: Not on file     Active member of club or organization: Not on file     Attends meetings of clubs or organizations: Not on file     Relationship status: Not on file    Intimate partner violence:     Fear of current or ex partner: Not on file     Emotionally abused: Not on file     Physically abused: Not on file     Forced sexual activity: Not on file   Other Topics Concern    Not on file   Social History Narrative    Not on file        Family History   Problem Relation Age of Onset    No Known Problems Father     No Known Problems Mother     No Known Problems Paternal Grandfather     No Known Problems Paternal Grandmother     No Known Problems Maternal Grandmother     No Known Problems Maternal Grandfather     No Known Problems Brother     No Known Problems Sister     No Known Problems Other     Breast Cancer Neg Hx     Cancer Neg Hx     Colon Cancer Neg Hx     Diabetes Neg Hx     Eclampsia Neg Hx     Hypertension Neg Hx     Ovarian Cancer Neg Hx      Labor Neg Hx     Spont Abortions Neg Hx     Stroke Neg Hx        Review of Systems   Constitutional: Negative. Negative for activity change, appetite change, chills, diaphoresis, fatigue, fever and unexpected weight change. HENT: Negative. Eyes: Negative. Respiratory: Negative. Cardiovascular: Negative.     Gastrointestinal: Negative for abdominal distention, abdominal pain, anal bleeding, blood in stool, constipation, diarrhea, nausea, rectal pain and vomiting. Endocrine: Negative. Genitourinary: Positive for menstrual problem (Irregular cycles). Negative for decreased urine volume, difficulty urinating, dyspareunia, dysuria, enuresis, flank pain, frequency, genital sores, hematuria, pelvic pain, urgency, vaginal bleeding, vaginal discharge and vaginal pain. Musculoskeletal: Negative. Skin: Negative. Allergic/Immunologic: Negative. Neurological: Negative. Hematological: Negative. Psychiatric/Behavioral: Negative. Objective:     Physical Exam   Constitutional: She is oriented to person, place, and time. She appears well-developed and well-nourished. No distress. HENT:   Head: Normocephalic and atraumatic. Eyes: Conjunctivae are normal.   Neck: Normal range of motion. Neck supple. Cardiovascular: Normal rate and regular rhythm. Pulmonary/Chest: Effort normal. No respiratory distress. Musculoskeletal: Normal range of motion. She exhibits no edema, tenderness or deformity. Neurological: She is alert and oriented to person, place, and time. She exhibits normal muscle tone. Coordination normal.   Skin: Skin is warm and dry. She is not diaphoretic. No pallor. Psychiatric: She has a normal mood and affect. Her behavior is normal. Judgment and thought content normal.       Assessment:          Diagnosis Orders   1. Uterine leiomyoma, unspecified location     2. Irregular menstrual cycle          Plan:      Medications placedthis encounter:  No orders of the defined types were placed in this encounter. Orders placedthis encounter:  No orders of the defined types were placed in this encounter. Follow up:  No follow-ups on file.

## 2019-10-10 ENCOUNTER — OFFICE VISIT (OUTPATIENT)
Dept: FAMILY MEDICINE CLINIC | Age: 49
End: 2019-10-10
Payer: COMMERCIAL

## 2019-10-10 VITALS
TEMPERATURE: 97.4 F | DIASTOLIC BLOOD PRESSURE: 72 MMHG | OXYGEN SATURATION: 99 % | HEIGHT: 64 IN | HEART RATE: 86 BPM | WEIGHT: 142 LBS | BODY MASS INDEX: 24.24 KG/M2 | SYSTOLIC BLOOD PRESSURE: 120 MMHG

## 2019-10-10 DIAGNOSIS — J40 BRONCHITIS: Primary | ICD-10-CM

## 2019-10-10 PROCEDURE — G8427 DOCREV CUR MEDS BY ELIG CLIN: HCPCS | Performed by: NURSE PRACTITIONER

## 2019-10-10 PROCEDURE — G8420 CALC BMI NORM PARAMETERS: HCPCS | Performed by: NURSE PRACTITIONER

## 2019-10-10 PROCEDURE — 4004F PT TOBACCO SCREEN RCVD TLK: CPT | Performed by: NURSE PRACTITIONER

## 2019-10-10 PROCEDURE — 99213 OFFICE O/P EST LOW 20 MIN: CPT | Performed by: NURSE PRACTITIONER

## 2019-10-10 PROCEDURE — G8484 FLU IMMUNIZE NO ADMIN: HCPCS | Performed by: NURSE PRACTITIONER

## 2019-10-10 RX ORDER — AZITHROMYCIN 250 MG/1
250 TABLET, FILM COATED ORAL SEE ADMIN INSTRUCTIONS
Qty: 6 TABLET | Refills: 0 | Status: SHIPPED | OUTPATIENT
Start: 2019-10-10 | End: 2019-10-15

## 2019-10-10 RX ORDER — METHYLPREDNISOLONE 4 MG/1
TABLET ORAL
Qty: 1 KIT | Refills: 0 | Status: SHIPPED | OUTPATIENT
Start: 2019-10-10 | End: 2019-10-16

## 2019-10-10 ASSESSMENT — ENCOUNTER SYMPTOMS
DIARRHEA: 0
SINUS PAIN: 0
RHINORRHEA: 0
COUGH: 1
VOMITING: 0
NAUSEA: 0

## 2020-01-13 ENCOUNTER — OFFICE VISIT (OUTPATIENT)
Dept: OBGYN CLINIC | Age: 50
End: 2020-01-13
Payer: COMMERCIAL

## 2020-01-13 VITALS — DIASTOLIC BLOOD PRESSURE: 72 MMHG | SYSTOLIC BLOOD PRESSURE: 128 MMHG | WEIGHT: 148 LBS | BODY MASS INDEX: 25.4 KG/M2

## 2020-01-13 PROCEDURE — 99396 PREV VISIT EST AGE 40-64: CPT | Performed by: OBSTETRICS & GYNECOLOGY

## 2020-01-13 PROCEDURE — G8484 FLU IMMUNIZE NO ADMIN: HCPCS | Performed by: OBSTETRICS & GYNECOLOGY

## 2020-01-13 NOTE — PROGRESS NOTES
Subjective:      Patient ID: Varney Meckel is a 52 y.o. female    Annual exam.  No GYN complaints. Normal cycles. Pap performed and screening mammogram ordered. STD screening offered. Monthly SBE encouraged. F/U annual or prn. Vitals:  /72   Wt 148 lb (67.1 kg)   LMP 01/07/2020   BMI 25.40 kg/m²   Past Medical History:   Diagnosis Date    Abnormal Pap smear of cervix     Diverticular disease     Diverticulitis      Past Surgical History:   Procedure Laterality Date    DILATION AND CURETTAGE       Allergies:  Sulfa antibiotics  Current Outpatient Medications   Medication Sig Dispense Refill    clotrimazole-betamethasone (LOTRISONE) 1-0.05 % cream Apply topically 2 times daily. (Patient not taking: Reported on 1/13/2020) 45 g 0     No current facility-administered medications for this visit.       Social History     Socioeconomic History    Marital status: Single     Spouse name: Not on file    Number of children: Not on file    Years of education: Not on file    Highest education level: Not on file   Occupational History    Not on file   Social Needs    Financial resource strain: Not on file    Food insecurity:     Worry: Not on file     Inability: Not on file    Transportation needs:     Medical: Not on file     Non-medical: Not on file   Tobacco Use    Smoking status: Current Every Day Smoker     Packs/day: 0.25    Smokeless tobacco: Never Used   Substance and Sexual Activity    Alcohol use: Yes     Comment: socially    Drug use: No    Sexual activity: Not Currently   Lifestyle    Physical activity:     Days per week: Not on file     Minutes per session: Not on file    Stress: Not on file   Relationships    Social connections:     Talks on phone: Not on file     Gets together: Not on file     Attends Rastafarian service: Not on file     Active member of club or organization: Not on file     Attends meetings of clubs or organizations: Not on file     Relationship status: Not on file    Intimate partner violence:     Fear of current or ex partner: Not on file     Emotionally abused: Not on file     Physically abused: Not on file     Forced sexual activity: Not on file   Other Topics Concern    Not on file   Social History Narrative    Not on file     Family History   Problem Relation Age of Onset    No Known Problems Father     No Known Problems Mother     No Known Problems Paternal Grandfather     No Known Problems Paternal Grandmother     No Known Problems Maternal Grandmother     No Known Problems Maternal Grandfather     No Known Problems Brother     No Known Problems Sister     No Known Problems Other     Breast Cancer Neg Hx     Cancer Neg Hx     Colon Cancer Neg Hx     Diabetes Neg Hx     Eclampsia Neg Hx     Hypertension Neg Hx     Ovarian Cancer Neg Hx      Labor Neg Hx     Spont Abortions Neg Hx     Stroke Neg Hx        Review of Systems   Constitutional: Negative. Negative for activity change, appetite change, chills, diaphoresis, fatigue, fever and unexpected weight change. HENT: Negative. Eyes: Negative. Respiratory: Negative. Cardiovascular: Negative. Gastrointestinal: Negative for abdominal distention, abdominal pain, anal bleeding, blood in stool, constipation, diarrhea, nausea, rectal pain and vomiting. Endocrine: Negative. Genitourinary: Negative for decreased urine volume, difficulty urinating, dyspareunia, dysuria, enuresis, flank pain, frequency, genital sores, hematuria, menstrual problem, pelvic pain, urgency, vaginal bleeding, vaginal discharge and vaginal pain. Musculoskeletal: Negative. Skin: Negative. Allergic/Immunologic: Negative. Neurological: Negative. Hematological: Negative. Psychiatric/Behavioral: Negative. Objective:     Physical Exam  Constitutional:       Appearance: She is well-developed. HENT:      Head: Normocephalic.    Neck:      Musculoskeletal: Normal range of motion mammogram, encounter for  KARLA DIGITAL SCREEN W CAD BILATERAL         Plan:      Medications placedthis encounter:  No orders of the defined types were placed in this encounter. Orders placedthis encounter:  Orders Placed This Encounter   Procedures    KARLA DIGITAL SCREEN W CAD BILATERAL     Standing Status:   Future     Standing Expiration Date:   1/12/2021    PAP SMEAR     Standing Status:   Future     Standing Expiration Date:   1/13/2021     Order Specific Question:   Collection Type     Answer: Thin Prep     Order Specific Question:   Prior Abnormal Pap Test     Answer:   No     Order Specific Question:   Screening or Diagnostic     Answer:   Screening     Order Specific Question:   HPV Requested? Answer:   Yes     Order Specific Question:   High Risk Patient     Answer:   N/A         Follow up:  Return in about 1 year (around 1/13/2021) for Annual.   Repeat Annual GYN exam every 1 year. Cervical Cytology exam starts age 24. If Negative Cytology;  follow-up screening per current guidelines. Mammograms yearly starting at age 36. Calcium and Vitamin D dosing reviewed ( age appropriate ). Colonoscopy and bone density screening discussed ( age appropriate ). Birth control and STD prevention discussed ( age appropriate ). Gardisil counseling completed for all patients 7-35 yo. Routine health maintenance ( per PCP and guidelines ). The patient was asked if she would like a chaperone present for her intimate exam. She  Declines he chaperone.  Ruben Valle

## 2020-01-14 ASSESSMENT — ENCOUNTER SYMPTOMS
EYES NEGATIVE: 1
ALLERGIC/IMMUNOLOGIC NEGATIVE: 1
DIARRHEA: 0
ABDOMINAL PAIN: 0
NAUSEA: 0
BLOOD IN STOOL: 0
CONSTIPATION: 0
ANAL BLEEDING: 0
ABDOMINAL DISTENTION: 0
RESPIRATORY NEGATIVE: 1
RECTAL PAIN: 0
VOMITING: 0

## 2020-01-17 ENCOUNTER — HOSPITAL ENCOUNTER (OUTPATIENT)
Dept: WOMENS IMAGING | Age: 50
Discharge: HOME | End: 2020-01-19
Payer: COMMERCIAL

## 2020-01-17 PROCEDURE — 77067 SCR MAMMO BI INCL CAD: CPT

## 2020-02-27 ENCOUNTER — TELEPHONE (OUTPATIENT)
Dept: FAMILY MEDICINE CLINIC | Age: 50
End: 2020-02-27

## 2020-02-27 NOTE — TELEPHONE ENCOUNTER
Patient called in today and was wondering if her Lipid panel is covered  By her insurance as she tried calling them and the codes that were provided with the labs they stated that they were spitting them back out. Patient is willing to get the labs drawn just want to make sure that they are covered.

## 2021-01-14 ENCOUNTER — OFFICE VISIT (OUTPATIENT)
Dept: OBGYN CLINIC | Age: 51
End: 2021-01-14
Payer: COMMERCIAL

## 2021-01-14 VITALS
WEIGHT: 165 LBS | BODY MASS INDEX: 28.17 KG/M2 | SYSTOLIC BLOOD PRESSURE: 120 MMHG | HEIGHT: 64 IN | DIASTOLIC BLOOD PRESSURE: 74 MMHG

## 2021-01-14 DIAGNOSIS — Z12.31 SCREENING MAMMOGRAM, ENCOUNTER FOR: ICD-10-CM

## 2021-01-14 DIAGNOSIS — Z01.419 WOMEN'S ANNUAL ROUTINE GYNECOLOGICAL EXAMINATION: Primary | ICD-10-CM

## 2021-01-14 DIAGNOSIS — Z11.51 SCREENING FOR HUMAN PAPILLOMAVIRUS: ICD-10-CM

## 2021-01-14 DIAGNOSIS — R82.90 FOUL SMELLING URINE: ICD-10-CM

## 2021-01-14 DIAGNOSIS — M54.50 LOW BACK PAIN WITHOUT SCIATICA, UNSPECIFIED BACK PAIN LATERALITY, UNSPECIFIED CHRONICITY: ICD-10-CM

## 2021-01-14 LAB
BILIRUBIN, POC: NORMAL
BLOOD URINE, POC: NORMAL
CLARITY, POC: CLEAR
COLOR, POC: YELLOW
GLUCOSE URINE, POC: NORMAL
KETONES, POC: NORMAL
LEUKOCYTE EST, POC: NORMAL
NITRITE, POC: NORMAL
PH, POC: 7
PROTEIN, POC: NORMAL
SPECIFIC GRAVITY, POC: 1.01
UROBILINOGEN, POC: NORMAL

## 2021-01-14 PROCEDURE — 81003 URINALYSIS AUTO W/O SCOPE: CPT | Performed by: OBSTETRICS & GYNECOLOGY

## 2021-01-14 PROCEDURE — G8484 FLU IMMUNIZE NO ADMIN: HCPCS | Performed by: OBSTETRICS & GYNECOLOGY

## 2021-01-14 PROCEDURE — 99396 PREV VISIT EST AGE 40-64: CPT | Performed by: OBSTETRICS & GYNECOLOGY

## 2021-01-14 ASSESSMENT — VISUAL ACUITY: OU: 1

## 2021-01-14 ASSESSMENT — PATIENT HEALTH QUESTIONNAIRE - PHQ9
SUM OF ALL RESPONSES TO PHQ QUESTIONS 1-9: 0
SUM OF ALL RESPONSES TO PHQ QUESTIONS 1-9: 0
SUM OF ALL RESPONSES TO PHQ9 QUESTIONS 1 & 2: 0
SUM OF ALL RESPONSES TO PHQ QUESTIONS 1-9: 0

## 2021-01-14 ASSESSMENT — ENCOUNTER SYMPTOMS
RECTAL PAIN: 0
CONSTIPATION: 0
DIARRHEA: 0
NAUSEA: 0
EYES NEGATIVE: 1
ABDOMINAL DISTENTION: 0
ALLERGIC/IMMUNOLOGIC NEGATIVE: 1
ANAL BLEEDING: 0
VOMITING: 0
BLOOD IN STOOL: 0
RESPIRATORY NEGATIVE: 1
ABDOMINAL PAIN: 0

## 2021-01-14 NOTE — PROGRESS NOTES
Subjective:      Patient ID: Matilde Sims is a 48 y.o. female    Annual exam.  No PMB. No GYN complaints. Pap performed and screening mammogram ordered. Monthly SBE encouraged. Screening colonoscopy recommended per routine. F/U annual exam or prn. Vitals: There were no vitals taken for this visit. Past Medical History:   Diagnosis Date    Abnormal Pap smear of cervix     Diverticular disease     Diverticulitis      Past Surgical History:   Procedure Laterality Date    DILATION AND CURETTAGE       Allergies:  Sulfa antibiotics  Current Outpatient Medications   Medication Sig Dispense Refill    clotrimazole-betamethasone (LOTRISONE) 1-0.05 % cream Apply topically 2 times daily. (Patient not taking: Reported on 1/13/2020) 45 g 0     No current facility-administered medications for this visit.       Social History     Socioeconomic History    Marital status: Single     Spouse name: Not on file    Number of children: Not on file    Years of education: Not on file    Highest education level: Not on file   Occupational History    Not on file   Social Needs    Financial resource strain: Not on file    Food insecurity     Worry: Not on file     Inability: Not on file    Transportation needs     Medical: Not on file     Non-medical: Not on file   Tobacco Use    Smoking status: Current Every Day Smoker     Packs/day: 0.25    Smokeless tobacco: Never Used   Substance and Sexual Activity    Alcohol use: Yes     Comment: socially    Drug use: No    Sexual activity: Not Currently   Lifestyle    Physical activity     Days per week: Not on file     Minutes per session: Not on file    Stress: Not on file   Relationships    Social connections     Talks on phone: Not on file     Gets together: Not on file     Attends Restoration service: Not on file     Active member of club or organization: Not on file     Attends meetings of clubs or organizations: Not on file     Relationship status: Not on file  Intimate partner violence     Fear of current or ex partner: Not on file     Emotionally abused: Not on file     Physically abused: Not on file     Forced sexual activity: Not on file   Other Topics Concern    Not on file   Social History Narrative    Not on file     Family History   Problem Relation Age of Onset    No Known Problems Father     No Known Problems Mother     No Known Problems Paternal Grandfather     No Known Problems Paternal Grandmother     No Known Problems Maternal Grandmother     No Known Problems Maternal Grandfather     No Known Problems Brother     No Known Problems Sister     No Known Problems Other     Breast Cancer Neg Hx     Cancer Neg Hx     Colon Cancer Neg Hx     Diabetes Neg Hx     Eclampsia Neg Hx     Hypertension Neg Hx     Ovarian Cancer Neg Hx      Labor Neg Hx     Spont Abortions Neg Hx     Stroke Neg Hx        Review of Systems   Constitutional: Negative. Negative for activity change, appetite change, chills, diaphoresis, fatigue, fever and unexpected weight change. HENT: Negative. Eyes: Negative. Respiratory: Negative. Cardiovascular: Negative. Gastrointestinal: Negative for abdominal distention, abdominal pain, anal bleeding, blood in stool, constipation, diarrhea, nausea, rectal pain and vomiting. Endocrine: Negative. Genitourinary: Negative for decreased urine volume, difficulty urinating, dyspareunia, dysuria, enuresis, flank pain, frequency, genital sores, hematuria, menstrual problem, pelvic pain, urgency, vaginal bleeding, vaginal discharge and vaginal pain. Musculoskeletal: Negative. Skin: Negative. Allergic/Immunologic: Negative. Neurological: Negative. Hematological: Negative. Psychiatric/Behavioral: Negative. Objective:     Physical Exam  Constitutional:       Appearance: She is well-developed. HENT:      Head: Normocephalic. Eyes:      General: Lids are normal. Vision grossly intact. Neck:      Musculoskeletal: Normal range of motion and neck supple. Thyroid: No thyromegaly. Cardiovascular:      Rate and Rhythm: Normal rate and regular rhythm. Heart sounds: Normal heart sounds. Pulmonary:      Effort: Pulmonary effort is normal. No respiratory distress. Breath sounds: Normal breath sounds. No wheezing or rales. Chest:      Chest wall: No tenderness. Breasts:         Right: Normal. No swelling, bleeding, inverted nipple, mass, nipple discharge, skin change or tenderness. Left: Normal. No swelling, bleeding, inverted nipple, mass, nipple discharge, skin change or tenderness. Abdominal:      General: There is no distension. Palpations: Abdomen is soft. There is no mass. Tenderness: There is no abdominal tenderness. There is no guarding or rebound. Hernia: No hernia is present. There is no hernia in the left inguinal area or right inguinal area. Genitourinary:     General: Normal vulva. Pubic Area: No rash. Labia:         Right: No rash, tenderness, lesion or injury. Left: No rash, tenderness, lesion or injury. Urethra: No prolapse, urethral swelling or urethral lesion. Vagina: Normal. No signs of injury and foreign body. No vaginal discharge, erythema, tenderness or bleeding. Cervix: No cervical motion tenderness, discharge or friability. Uterus: Not deviated, not enlarged, not fixed and not tender. Adnexa:         Right: No mass, tenderness or fullness. Left: No mass, tenderness or fullness. Rectum: Normal.   Musculoskeletal: Normal range of motion. General: No tenderness. Lymphadenopathy:      Cervical: No cervical adenopathy. Upper Body:      Right upper body: No supraclavicular or axillary adenopathy. Left upper body: No supraclavicular or axillary adenopathy. Lower Body: No right inguinal adenopathy. No left inguinal adenopathy.    Skin:

## 2021-01-21 DIAGNOSIS — Z01.419 WOMEN'S ANNUAL ROUTINE GYNECOLOGICAL EXAMINATION: ICD-10-CM

## 2021-01-21 DIAGNOSIS — Z11.51 SCREENING FOR HUMAN PAPILLOMAVIRUS: ICD-10-CM

## 2021-02-02 ENCOUNTER — HOSPITAL ENCOUNTER (OUTPATIENT)
Dept: WOMENS IMAGING | Age: 51
Discharge: HOME | End: 2021-02-04
Payer: COMMERCIAL

## 2021-02-02 DIAGNOSIS — Z12.31 SCREENING MAMMOGRAM, ENCOUNTER FOR: ICD-10-CM

## 2021-02-02 PROCEDURE — 77067 SCR MAMMO BI INCL CAD: CPT

## 2021-08-25 ENCOUNTER — APPOINTMENT (OUTPATIENT)
Dept: CARDIAC CATH/INVASIVE PROCEDURES | Age: 51
DRG: 247 | End: 2021-08-25
Payer: COMMERCIAL

## 2021-08-25 ENCOUNTER — APPOINTMENT (OUTPATIENT)
Dept: GENERAL RADIOLOGY | Age: 51
DRG: 247 | End: 2021-08-25
Payer: COMMERCIAL

## 2021-08-25 ENCOUNTER — HOSPITAL ENCOUNTER (INPATIENT)
Age: 51
LOS: 1 days | Discharge: HOME | DRG: 247 | End: 2021-08-26
Attending: EMERGENCY MEDICINE | Admitting: INTERNAL MEDICINE
Payer: COMMERCIAL

## 2021-08-25 DIAGNOSIS — I21.11 ST ELEVATION MYOCARDIAL INFARCTION INVOLVING RIGHT CORONARY ARTERY (HCC): Primary | ICD-10-CM

## 2021-08-25 PROBLEM — I21.9 ACUTE MYOCARDIAL INFARCTION (HCC): Status: ACTIVE | Noted: 2021-08-25

## 2021-08-25 LAB
ALBUMIN SERPL-MCNC: 4.5 G/DL (ref 3.5–4.6)
ALP BLD-CCNC: 109 U/L (ref 40–130)
ALT SERPL-CCNC: 44 U/L (ref 0–33)
ANION GAP SERPL CALCULATED.3IONS-SCNC: 14 MEQ/L (ref 9–15)
AST SERPL-CCNC: 149 U/L (ref 0–35)
BASOPHILS ABSOLUTE: 0.1 K/UL (ref 0–0.2)
BASOPHILS RELATIVE PERCENT: 0.6 %
BILIRUB SERPL-MCNC: <0.2 MG/DL (ref 0.2–0.7)
BUN BLDV-MCNC: 9 MG/DL (ref 6–20)
CALCIUM SERPL-MCNC: 9.4 MG/DL (ref 8.5–9.9)
CHLORIDE BLD-SCNC: 96 MEQ/L (ref 95–107)
CO2: 22 MEQ/L (ref 20–31)
CREAT SERPL-MCNC: 0.68 MG/DL (ref 0.5–0.9)
EOSINOPHILS ABSOLUTE: 0.1 K/UL (ref 0–0.7)
EOSINOPHILS RELATIVE PERCENT: 0.7 %
GFR AFRICAN AMERICAN: >60
GFR NON-AFRICAN AMERICAN: >60
GLOBULIN: 3.4 G/DL (ref 2.3–3.5)
GLUCOSE BLD-MCNC: 191 MG/DL (ref 70–99)
HCT VFR BLD CALC: 47.6 % (ref 37–47)
HEMOGLOBIN: 16.3 G/DL (ref 12–16)
INR BLD: 0.9
LYMPHOCYTES ABSOLUTE: 1.8 K/UL (ref 1–4.8)
LYMPHOCYTES RELATIVE PERCENT: 14.6 %
MCH RBC QN AUTO: 28.4 PG (ref 27–31.3)
MCHC RBC AUTO-ENTMCNC: 34.2 % (ref 33–37)
MCV RBC AUTO: 83 FL (ref 82–100)
MONOCYTES ABSOLUTE: 0.6 K/UL (ref 0.2–0.8)
MONOCYTES RELATIVE PERCENT: 4.8 %
NEUTROPHILS ABSOLUTE: 10 K/UL (ref 1.4–6.5)
NEUTROPHILS RELATIVE PERCENT: 79.3 %
PDW BLD-RTO: 14.4 % (ref 11.5–14.5)
PLATELET # BLD: 339 K/UL (ref 130–400)
POTASSIUM SERPL-SCNC: 4.4 MEQ/L (ref 3.4–4.9)
PROTHROMBIN TIME: 12.3 SEC (ref 12.3–14.9)
RBC # BLD: 5.74 M/UL (ref 4.2–5.4)
SARS-COV-2, NAAT: NOT DETECTED
SODIUM BLD-SCNC: 132 MEQ/L (ref 135–144)
TOTAL PROTEIN: 7.9 G/DL (ref 6.3–8)
TROPONIN: 0.63 NG/ML (ref 0–0.01)
TROPONIN: 0.79 NG/ML (ref 0–0.01)
WBC # BLD: 12.6 K/UL (ref 4.8–10.8)

## 2021-08-25 PROCEDURE — C1760 CLOSURE DEV, VASC: HCPCS

## 2021-08-25 PROCEDURE — 36415 COLL VENOUS BLD VENIPUNCTURE: CPT

## 2021-08-25 PROCEDURE — 2500000003 HC RX 250 WO HCPCS

## 2021-08-25 PROCEDURE — 71045 X-RAY EXAM CHEST 1 VIEW: CPT

## 2021-08-25 PROCEDURE — 99285 EMERGENCY DEPT VISIT HI MDM: CPT

## 2021-08-25 PROCEDURE — 80053 COMPREHEN METABOLIC PANEL: CPT

## 2021-08-25 PROCEDURE — C1725 CATH, TRANSLUMIN NON-LASER: HCPCS

## 2021-08-25 PROCEDURE — 6360000002 HC RX W HCPCS

## 2021-08-25 PROCEDURE — 85347 COAGULATION TIME ACTIVATED: CPT

## 2021-08-25 PROCEDURE — 82565 ASSAY OF CREATININE: CPT

## 2021-08-25 PROCEDURE — 6370000000 HC RX 637 (ALT 250 FOR IP): Performed by: INTERNAL MEDICINE

## 2021-08-25 PROCEDURE — 4A023N7 MEASUREMENT OF CARDIAC SAMPLING AND PRESSURE, LEFT HEART, PERCUTANEOUS APPROACH: ICD-10-PCS | Performed by: INTERNAL MEDICINE

## 2021-08-25 PROCEDURE — 027135Z DILATION OF CORONARY ARTERY, TWO ARTERIES WITH TWO DRUG-ELUTING INTRALUMINAL DEVICES, PERCUTANEOUS APPROACH: ICD-10-PCS | Performed by: INTERNAL MEDICINE

## 2021-08-25 PROCEDURE — 96374 THER/PROPH/DIAG INJ IV PUSH: CPT

## 2021-08-25 PROCEDURE — B2111ZZ FLUOROSCOPY OF MULTIPLE CORONARY ARTERIES USING LOW OSMOLAR CONTRAST: ICD-10-PCS | Performed by: INTERNAL MEDICINE

## 2021-08-25 PROCEDURE — 6370000000 HC RX 637 (ALT 250 FOR IP): Performed by: EMERGENCY MEDICINE

## 2021-08-25 PROCEDURE — C1894 INTRO/SHEATH, NON-LASER: HCPCS

## 2021-08-25 PROCEDURE — C1887 CATHETER, GUIDING: HCPCS

## 2021-08-25 PROCEDURE — C1874 STENT, COATED/COV W/DEL SYS: HCPCS

## 2021-08-25 PROCEDURE — 93005 ELECTROCARDIOGRAM TRACING: CPT | Performed by: EMERGENCY MEDICINE

## 2021-08-25 PROCEDURE — 2000000000 HC ICU R&B

## 2021-08-25 PROCEDURE — 92941 PRQ TRLML REVSC TOT OCCL AMI: CPT | Performed by: INTERNAL MEDICINE

## 2021-08-25 PROCEDURE — 2580000003 HC RX 258

## 2021-08-25 PROCEDURE — B2151ZZ FLUOROSCOPY OF LEFT HEART USING LOW OSMOLAR CONTRAST: ICD-10-PCS | Performed by: INTERNAL MEDICINE

## 2021-08-25 PROCEDURE — 85025 COMPLETE CBC W/AUTO DIFF WBC: CPT

## 2021-08-25 PROCEDURE — C1769 GUIDE WIRE: HCPCS

## 2021-08-25 PROCEDURE — 2580000003 HC RX 258: Performed by: INTERNAL MEDICINE

## 2021-08-25 PROCEDURE — 6360000002 HC RX W HCPCS: Performed by: INTERNAL MEDICINE

## 2021-08-25 PROCEDURE — 84484 ASSAY OF TROPONIN QUANT: CPT

## 2021-08-25 PROCEDURE — 85610 PROTHROMBIN TIME: CPT

## 2021-08-25 PROCEDURE — 87635 SARS-COV-2 COVID-19 AMP PRB: CPT

## 2021-08-25 PROCEDURE — 6360000002 HC RX W HCPCS: Performed by: EMERGENCY MEDICINE

## 2021-08-25 PROCEDURE — 2709999900 HC NON-CHARGEABLE SUPPLY

## 2021-08-25 PROCEDURE — 93458 L HRT ARTERY/VENTRICLE ANGIO: CPT | Performed by: INTERNAL MEDICINE

## 2021-08-25 PROCEDURE — 96375 TX/PRO/DX INJ NEW DRUG ADDON: CPT

## 2021-08-25 RX ORDER — VALSARTAN 80 MG/1
80 TABLET ORAL DAILY
Status: DISCONTINUED | OUTPATIENT
Start: 2021-08-25 | End: 2021-08-26 | Stop reason: HOSPADM

## 2021-08-25 RX ORDER — NITROGLYCERIN 0.4 MG/1
0.4 TABLET SUBLINGUAL EVERY 5 MIN PRN
Status: DISCONTINUED | OUTPATIENT
Start: 2021-08-25 | End: 2021-08-26 | Stop reason: HOSPADM

## 2021-08-25 RX ORDER — SODIUM CHLORIDE 9 MG/ML
25 INJECTION, SOLUTION INTRAVENOUS PRN
Status: DISCONTINUED | OUTPATIENT
Start: 2021-08-25 | End: 2021-08-26 | Stop reason: HOSPADM

## 2021-08-25 RX ORDER — SODIUM CHLORIDE 9 MG/ML
INJECTION, SOLUTION INTRAVENOUS CONTINUOUS
Status: DISPENSED | OUTPATIENT
Start: 2021-08-25 | End: 2021-08-26

## 2021-08-25 RX ORDER — SODIUM CHLORIDE 0.9 % (FLUSH) 0.9 %
5-40 SYRINGE (ML) INJECTION PRN
Status: DISCONTINUED | OUTPATIENT
Start: 2021-08-25 | End: 2021-08-26 | Stop reason: HOSPADM

## 2021-08-25 RX ORDER — SODIUM CHLORIDE 0.9 % (FLUSH) 0.9 %
5-40 SYRINGE (ML) INJECTION EVERY 12 HOURS SCHEDULED
Status: DISCONTINUED | OUTPATIENT
Start: 2021-08-25 | End: 2021-08-26 | Stop reason: HOSPADM

## 2021-08-25 RX ORDER — ATROPINE SULFATE 0.4 MG/ML
0.6 AMPUL (ML) INJECTION
Status: ACTIVE | OUTPATIENT
Start: 2021-08-25 | End: 2021-08-25

## 2021-08-25 RX ORDER — 0.9 % SODIUM CHLORIDE 0.9 %
500 INTRAVENOUS SOLUTION INTRAVENOUS ONCE
Status: DISCONTINUED | OUTPATIENT
Start: 2021-08-25 | End: 2021-08-26 | Stop reason: HOSPADM

## 2021-08-25 RX ORDER — CARVEDILOL 6.25 MG/1
6.25 TABLET ORAL 2 TIMES DAILY WITH MEALS
Status: DISCONTINUED | OUTPATIENT
Start: 2021-08-25 | End: 2021-08-26 | Stop reason: HOSPADM

## 2021-08-25 RX ORDER — ASPIRIN 81 MG/1
81 TABLET, CHEWABLE ORAL DAILY
Status: DISCONTINUED | OUTPATIENT
Start: 2021-08-26 | End: 2021-08-26 | Stop reason: HOSPADM

## 2021-08-25 RX ORDER — ACETAMINOPHEN 325 MG/1
650 TABLET ORAL EVERY 4 HOURS PRN
Status: DISCONTINUED | OUTPATIENT
Start: 2021-08-25 | End: 2021-08-26 | Stop reason: HOSPADM

## 2021-08-25 RX ORDER — HEPARIN SODIUM 1000 [USP'U]/ML
4000 INJECTION, SOLUTION INTRAVENOUS; SUBCUTANEOUS ONCE
Status: COMPLETED | OUTPATIENT
Start: 2021-08-25 | End: 2021-08-25

## 2021-08-25 RX ORDER — PANTOPRAZOLE SODIUM 40 MG/1
40 TABLET, DELAYED RELEASE ORAL
Status: DISCONTINUED | OUTPATIENT
Start: 2021-08-26 | End: 2021-08-26 | Stop reason: HOSPADM

## 2021-08-25 RX ORDER — ONDANSETRON 2 MG/ML
4 INJECTION INTRAMUSCULAR; INTRAVENOUS ONCE
Status: COMPLETED | OUTPATIENT
Start: 2021-08-25 | End: 2021-08-25

## 2021-08-25 RX ORDER — HYDRALAZINE HYDROCHLORIDE 20 MG/ML
10 INJECTION INTRAMUSCULAR; INTRAVENOUS EVERY 10 MIN PRN
Status: DISCONTINUED | OUTPATIENT
Start: 2021-08-25 | End: 2021-08-26 | Stop reason: HOSPADM

## 2021-08-25 RX ORDER — ASPIRIN 81 MG/1
324 TABLET, CHEWABLE ORAL ONCE
Status: COMPLETED | OUTPATIENT
Start: 2021-08-25 | End: 2021-08-25

## 2021-08-25 RX ORDER — ATORVASTATIN CALCIUM 40 MG/1
80 TABLET, FILM COATED ORAL NIGHTLY
Status: DISCONTINUED | OUTPATIENT
Start: 2021-08-25 | End: 2021-08-26 | Stop reason: HOSPADM

## 2021-08-25 RX ORDER — ONDANSETRON 2 MG/ML
4 INJECTION INTRAMUSCULAR; INTRAVENOUS EVERY 6 HOURS PRN
Status: DISCONTINUED | OUTPATIENT
Start: 2021-08-25 | End: 2021-08-26 | Stop reason: HOSPADM

## 2021-08-25 RX ADMIN — ASPIRIN 324 MG: 81 TABLET, CHEWABLE ORAL at 14:56

## 2021-08-25 RX ADMIN — ONDANSETRON 4 MG: 2 INJECTION INTRAMUSCULAR; INTRAVENOUS at 15:00

## 2021-08-25 RX ADMIN — SODIUM CHLORIDE: 9 INJECTION, SOLUTION INTRAVENOUS at 23:00

## 2021-08-25 RX ADMIN — ATORVASTATIN CALCIUM 80 MG: 40 TABLET, FILM COATED ORAL at 21:15

## 2021-08-25 RX ADMIN — SODIUM CHLORIDE, PRESERVATIVE FREE 10 ML: 5 INJECTION INTRAVENOUS at 21:00

## 2021-08-25 RX ADMIN — ACETAMINOPHEN 650 MG: 325 TABLET ORAL at 21:15

## 2021-08-25 RX ADMIN — SODIUM CHLORIDE: 9 INJECTION, SOLUTION INTRAVENOUS at 17:35

## 2021-08-25 RX ADMIN — HEPARIN SODIUM 4000 UNITS: 1000 INJECTION INTRAVENOUS; SUBCUTANEOUS at 14:55

## 2021-08-25 RX ADMIN — NITROGLYCERIN 0.5 INCH: 20 OINTMENT TOPICAL at 17:34

## 2021-08-25 RX ADMIN — NITROGLYCERIN 0.4 MG: 0.4 TABLET SUBLINGUAL at 15:01

## 2021-08-25 RX ADMIN — TICAGRELOR 180 MG: 90 TABLET ORAL at 14:57

## 2021-08-25 RX ADMIN — VALSARTAN 80 MG: 80 TABLET, FILM COATED ORAL at 21:21

## 2021-08-25 RX ADMIN — CARVEDILOL 6.25 MG: 6.25 TABLET, FILM COATED ORAL at 17:41

## 2021-08-25 ASSESSMENT — PAIN SCALES - GENERAL
PAINLEVEL_OUTOF10: 5
PAINLEVEL_OUTOF10: 5
PAINLEVEL_OUTOF10: 2
PAINLEVEL_OUTOF10: 5
PAINLEVEL_OUTOF10: 0
PAINLEVEL_OUTOF10: 5
PAINLEVEL_OUTOF10: 8
PAINLEVEL_OUTOF10: 5

## 2021-08-25 ASSESSMENT — PAIN DESCRIPTION - LOCATION
LOCATION: BACK
LOCATION: CHEST
LOCATION: BACK

## 2021-08-25 ASSESSMENT — PAIN DESCRIPTION - PAIN TYPE
TYPE: ACUTE PAIN

## 2021-08-25 ASSESSMENT — PAIN DESCRIPTION - FREQUENCY
FREQUENCY: CONTINUOUS

## 2021-08-25 ASSESSMENT — ENCOUNTER SYMPTOMS
BACK PAIN: 1
ABDOMINAL PAIN: 0
SORE THROAT: 0
SHORTNESS OF BREATH: 0
NAUSEA: 0
CHEST TIGHTNESS: 0
VOMITING: 0
EYE PAIN: 0

## 2021-08-25 ASSESSMENT — PAIN - FUNCTIONAL ASSESSMENT
PAIN_FUNCTIONAL_ASSESSMENT: ACTIVITIES ARE NOT PREVENTED

## 2021-08-25 ASSESSMENT — PAIN DESCRIPTION - DESCRIPTORS
DESCRIPTORS: ACHING
DESCRIPTORS: ACHING;PRESSURE
DESCRIPTORS: ACHING

## 2021-08-25 ASSESSMENT — PAIN DESCRIPTION - PROGRESSION
CLINICAL_PROGRESSION: NOT CHANGED

## 2021-08-25 ASSESSMENT — PAIN DESCRIPTION - ONSET
ONSET: ON-GOING

## 2021-08-25 NOTE — ED PROVIDER NOTES
[address] [facility] ED  EMERGENCY DEPARTMENT ENCOUNTER      Pt Name: Karri Cid  MRN: [id number]  [place] 1970  Date of evaluation: 8/25/2021  Provider: Molly Oleary DO    CHIEF COMPLAINT       Chief Complaint   Patient presents with    Chest Pain         HISTORY OF PRESENT ILLNESS   (Location/Symptom, Timing/Onset, Context/Setting, Quality, Duration, Modifying Factors, Severity)  Note limiting factors. Karri Cid is a 48 y.o. female who presents to the emergency department . Patient comes in with intermittent chest pain on and off for several days. 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 Today the chest pain came on at 4 AM and is not going away. Pain into her upper back and into her jaw. Hot flashing. No shortness of breath. No history of heart problems. She is a smoker. HPI    Nursing Notes were reviewed. REVIEW OF SYSTEMS    (2-9 systems for level 4, 10 or more for level 5)     Review of Systems   Constitutional: Positive for diaphoresis. Negative for activity change, appetite change, fatigue and fever. HENT: Negative for congestion and sore throat. Eyes: Negative for pain and visual disturbance. Respiratory: Negative for chest tightness and shortness of breath. Cardiovascular: Positive for chest pain. Gastrointestinal: Negative for abdominal pain, nausea and vomiting. Endocrine: Negative for polydipsia. Genitourinary: Negative for flank pain and urgency. Musculoskeletal: Positive for back pain. Negative for gait problem and neck stiffness. Skin: Negative for rash. Neurological: Negative for weakness, light-headedness and headaches. Psychiatric/Behavioral: Negative for confusion and sleep disturbance. Except as noted above the remainder of the review of systems was reviewed and negative.        PAST MEDICAL HISTORY     Past Medical History:   Diagnosis Date    Abnormal Pap smear of cervix     Diverticular disease     Diverticulitis          SURGICAL HISTORY       Past Surgical History:   Procedure Laterality Date    DILATION AND CURETTAGE           CURRENT MEDICATIONS       Previous Medications    No medications on file       ALLERGIES     Sulfa antibiotics    FAMILY HISTORY       Family History   Problem Relation Age of Onset    No Known Problems Father     No Known Problems Mother     No Known Problems Paternal Grandfather     No Known Problems Paternal Grandmother     No Known Problems Maternal Grandmother     No Known Problems Maternal Grandfather     No Known Problems Brother     No Known Problems Sister     No Known Problems Other     Breast Cancer Neg Hx     Cancer Neg Hx     Colon Cancer Neg Hx     Diabetes Neg Hx     Eclampsia Neg Hx     Hypertension Neg Hx     Ovarian Cancer Neg Hx      Labor Neg Hx     Spont Abortions Neg Hx     Stroke Neg Hx           SOCIAL HISTORY       Social History     Socioeconomic History    Marital status: Single     Spouse name: None    Number of children: None    Years of education: None    Highest education level: None   Occupational History    None   Tobacco Use    Smoking status: Current Every Day Smoker     Packs/day: 0.25    Smokeless tobacco: Never Used   Substance and Sexual Activity    Alcohol use: Yes     Comment: socially    Drug use: No    Sexual activity: Not Currently   Other Topics Concern    None   Social History Narrative    None     Social Determinants of Health     Financial Resource Strain:     Difficulty of Paying Living Expenses:    Food Insecurity:     Worried About Running Out of Food in the Last Year:     Ran Out of Food in the Last Year:    Transportation Needs:     Lack of Transportation (Medical):      Lack of Transportation (Non-Medical):    Physical Activity:     Days of Exercise per Week:     Minutes of Exercise per Session:    Stress:     Feeling of Stress :    Social Connections:     Frequency of Communication with Friends and Family:     Frequency of Social Gatherings with Friends and Family:     Attends Mu-ism Services:     Active Member of Clubs or Organizations:     Attends Club or Organization Meetings:     Marital Status:    Intimate Partner Violence:     Fear of Current or Ex-Partner:     Emotionally Abused:     Physically Abused:     Sexually Abused:        SCREENINGS                        PHYSICAL EXAM    (up to 7 for level 4, 8 or more for level 5)     ED Triage Vitals [08/25/21 1424]   BP Temp Temp Source Pulse Resp SpO2 Height Weight   (!) 200/97 97.6 °F (36.4 °C) Temporal 94 18 100 % 5' 6\" (1.676 m) 150 lb (68 kg)       Physical Exam  Vitals and nursing note reviewed. Constitutional:       General: She is not in acute distress. Appearance: She is well-developed. She is not ill-appearing or diaphoretic. HENT:      Head: Normocephalic and atraumatic. Right Ear: External ear normal.      Left Ear: External ear normal.      Nose: Nose normal.      Mouth/Throat:      Pharynx: No oropharyngeal exudate. Eyes:      Conjunctiva/sclera: Conjunctivae normal.      Pupils: Pupils are equal, round, and reactive to light. Neck:      Thyroid: No thyromegaly. Vascular: No JVD. Trachea: No tracheal deviation. Cardiovascular:      Rate and Rhythm: Normal rate and regular rhythm. Heart sounds: Normal heart sounds. No murmur heard. Pulmonary:      Effort: Pulmonary effort is normal. No respiratory distress. Breath sounds: Normal breath sounds. No wheezing. Abdominal:      General: Bowel sounds are normal.      Palpations: Abdomen is soft. Tenderness: There is no abdominal tenderness. There is no guarding. Musculoskeletal:         General: No swelling. Normal range of motion. Cervical back: Normal range of motion and neck supple. Skin:     General: Skin is warm and dry. Findings: No rash.    Neurological: General: No focal deficit present. Mental Status: She is alert and oriented to person, place, and time. Cranial Nerves: No cranial nerve deficit. Psychiatric:         Behavior: Behavior normal.         DIAGNOSTIC RESULTS     EKG: All EKG's are interpreted by the Emergency Department Physician who either signs or Co-signs this chart in the absence of a cardiologist.    Normal sinus rhythm 85 bpm ST elevation inferiorly consistent with ST elevation MI    RADIOLOGY:   Non-plain film images such as CT, Ultrasound and MRI are read by the radiologist. Plain radiographic images are visualized and preliminarily interpreted by the emergency physician with the below findings:    Chest x-ray shows no acute pulmonary disease    Interpretation per the Radiologist below, if available at the time of this note:    XR CHEST PORTABLE    (Results Pending)         ED BEDSIDE ULTRASOUND:   Performed by ED Physician - none    LABS:  Labs Reviewed   CBC WITH AUTO DIFFERENTIAL   COMPREHENSIVE METABOLIC PANEL   TROPONIN   PROTIME-INR       All other labs were within normal range or not returned as of this dictation. EMERGENCY DEPARTMENT COURSE and DIFFERENTIAL DIAGNOSIS/MDM:   Vitals:    Vitals:    08/25/21 1424   BP: (!) 200/97   Pulse: 94   Resp: 18   Temp: 97.6 °F (36.4 °C)   TempSrc: Temporal   SpO2: 100%   Weight: 150 lb (68 kg)   Height: 5' 6\" (1.676 m)       Patient comes in with intermittent chest pain with exertion all week. Today chest pain came on at 4:30 AM and will not go away. EKG shows ST elevation inferior MI. Code purple called. Patient requested Dr. Faith Yarbrough and therefore Dr. Joyce Mcconnell came down to the ER, saw the patient and will take her to the Cath Lab. MDM      REASSESSMENT          CRITICAL CARE TIME   Total Critical Care time was 30 minutes, excluding separately reportable procedures.   There was a high probability of clinically significant/life threatening deterioration in the patient's condition which

## 2021-08-25 NOTE — ED NOTES
Dr. Clinton Payan at bedside     [facility]  08/25/21 1284

## 2021-08-25 NOTE — PROGRESS NOTES
Patient arrived to ICU at 1700 denying any chest pain or discomfort at this time. IV fluids started. Patient medicated per MAR. Will continue to monitor.     Electronically signed by Freda Middleton RN on 8/25/2021 at 6:41 PM

## 2021-08-25 NOTE — ED TRIAGE NOTES
Pt has co chest pain times one week. Pt states it usually goes away but today it is not. Pt is aox4 pwd.

## 2021-08-25 NOTE — OP NOTE
dampening, suggesting small caliber RCA as well as ostial narrowing. With the guide catheter suboptimally engaged in order to allow a pressure waveform, the 0.014 180 cm run-through wire was readvanced. The 2.0 x 12 mm balloon was readvanced, and multiple inflations were performed. A 2.25 x 33 mm Xience Suzanna drug-eluting stent was deployed in the proximal RCA. 2.25 x 12 mm Xience Suzanna drug-eluting stent was then deployed proximal to the first stent to cover the ostium, final results showed 0% residual stenosis with MARCUS-3 flow and no dissection. The guide catheter and guidewire were removed  removed. A 5-Slovak angled pigtail catheter was advanced over a 0.035 guidewire across the aortic valve into the left ventricle. Left ventricular end-diastolic pressure was measured. Left ventriculography was performed in about 30° OH view. Slow manual pullback was performed across the aortic valve. There were no immediate complications. The right femoral artery sheath was removed and hemostasis was achieved using Angio-Seal hemostatic device. HEMODYNAMIC / ANGIOGRAPHIC DATA:    1. Left ventricular end diastolic pressure was 15-39 mmHg. No systolic gradient across the aortic valve. 2. Left ventriculography revealed an EF around 50-55%. No regional wall motion abnormality was apparent in the OH view  3. The left main coronary artery  has 0% stenosis, arises from the left sinus of Valsalva and bifurcates into the left anterior descending artery and the left circumflex artery. 4. The left anterior descending artery has 0% proximal stenosis. The proximal mid LAD has about 30 to 40% stenosis  and the mid LAD has 50 to 60% smooth stenosis. 5. The left circumflex is large, either codominant or dominant. First major obtuse marginal branch measures about 3 mm. Proximal left circumflex artery has less than 20% stenosis.   The distal left circumflex artery at the origin of the posterolateral ventricular branch has about 70 to 75% smooth stenosis . The amount of myocardium supplied by this lesion is quite small. 6. The right coronary artery is either codominant or nondominant. The vessel is 100% occluded proximally there is dampening of waveform when the RCA is selectively engaged. There is ostial disease in the RCA, there is MARCUS 0 antegrade flow  7. Successful PCI/stenting of the ostial and proximal/proximal mid RCA was performed, with reduction of stenosis from 100% to 0%. There was MARCUS 0 flow before and MARCUS-3 flow after the procedure. From the time the code purple was called to the first balloon inflation was about 31 minutes. Technical challenges here include difficulty with guide selection and cannulation of the RCA. The RCA has an aberrant origin possibly a posterior takeoff, with 3 DRC guide catheter was suboptimal, AL-1 guide catheter had to be used. Also, when I felt that the RCA was of relatively small caliber, I needed to quickly image the left system to make sure there was no plaque rupture in the left system. RECOMMENDATIONS:  Post-procedure care will focus on prevention of any ischemic events and congestive complications. Aggressive risk factor modification and dual antiplatelet therapy are recommended.   Ongoing cardiology care will be directed by Dr. Annamaria Garrett MD

## 2021-08-25 NOTE — ED NOTES
Dr. Garth Fox at bedside     Maurilio Healy, [facility]  08/25/21 6069

## 2021-08-25 NOTE — H&P
[facility]    History & Physical      Date:   8/25/2021  Patient name:  Mell Levine  Date of admission:  8/25/2021  2:27 PM  MRN:   [id number]  YOB: 1970    Reason for Admission:  ACS,inferior ST elevation MI  Chief Compaint:  Chest discomfort   History Obtained From:  Danya Gilbert MD.    Primary Cardiologist: Pt requests /associates    Admitting  Cardiologist:  Stevan Runner MD [facility] [place]      History of Present Illness:    Mell Levine is a 48 y.o.  female who is being admitted to [facility] by [facility]. Previous [address] and [address] records have been reviewed in detail. Patient is a 1 to 2 pack smoker, postmenopausal female, who has noticed exertional chest discomfort over the past few weeks, increasing in frequency and intensity and coming on with less activity. Today she had persistent anterior chest discomfort, heavy ache, 7 out of 10 in intensity, radiating into the mid back, associated with some nausea. She decided to seek emergent medical attention because the discomfort was persisting, and getting more intense. She was hypertensive on presentation. She has no documented coronary artery disease. Her EKG showed inferior ST segment elevation and anterior ST depression.   Laboratory data are pending at the time of initial encounter with the patient             Past Medical History:    Past Medical History:   Diagnosis Date    Abnormal Pap smear of cervix     Diverticular disease     Diverticulitis        Past Surgical History:    Past Surgical History:   Procedure Laterality Date    DILATION AND CURETTAGE          Allergies:    Sulfa antibiotics    Home Medications:    Prior to Admission medications    Not on File       Current Medications:   sodium chloride      sodium chloride 200 mL/hr at 08/25/21 1735    sodium chloride         Current Facility-Administered Medications   Medication Dose Route Frequency Provider Last Rate Last Admin    sodium chloride flush 0.9 % injection 5-40 mL  5-40 mL IntraVENous 2 times per day Viveca Canes, DO        sodium chloride flush 0.9 % injection 5-40 mL  5-40 mL IntraVENous PRN Junie Witt, DO        0.9 % sodium chloride infusion  25 mL IntraVENous PRN Junie Balderrama DO        nitroGLYCERIN (NITROSTAT) SL tablet 0.4 mg  0.4 mg Sublingual Q5 Min PRN Waleska Shaver MD   0.4 mg at 08/25/21 1501    ioversol (OPTIRAY) 68 % injection 100 mL  100 mL IntraVENous Once Waleska Shaver MD        0.9 % sodium chloride infusion   IntraVENous Continuous Waleska Shaver  mL/hr at 08/25/21 1735 New Bag at 08/25/21 1735    sodium chloride flush 0.9 % injection 5-40 mL  5-40 mL IntraVENous 2 times per day Waleska Shaver MD        sodium chloride flush 0.9 % injection 5-40 mL  5-40 mL IntraVENous PRN Waleska Shaver MD        0.9 % sodium chloride infusion  25 mL IntraVENous PRN Waleska Shaver MD        0.9 % sodium chloride bolus  500 mL Intra-arterial Once Waleska Shaver MD        acetaminophen (TYLENOL) tablet 650 mg  650 mg Oral Q4H PRN Waleska Shaver MD        atropine injection 0.6 mg  0.6 mg IntraVENous Once PRN Waleska Shaver MD        ondansetron [facility]) injection 4 mg  4 mg IntraVENous Q6H PRN Waleska Shaver MD        hydrALAZINE (APRESOLINE) injection 10 mg  10 mg IntraVENous Q10 Min PRN Waleska Shaver MD        carvedilol (COREG) tablet 6.25 mg  6.25 mg Oral BID WC Waleska Shaver MD   6.25 mg at 08/25/21 1741    valsartan (DIOVAN) tablet 80 mg  80 mg Oral Daily Waleska Shaver MD        [START ON 8/26/2021] ticagrelor (BRILINTA) tablet 90 mg  90 mg Oral BID Waleska Shaver MD        [START ON 8/26/2021] aspirin chewable tablet 81 mg  81 mg Oral Daily Waleska Shaver MD        atorvastatin (LIPITOR) tablet 80 mg  80 mg Oral Nightly Waleska Shaver MD        [START ON 8/26/2021] pantoprazole (PROTONIX) tablet 40 mg  40 mg Oral QAM AC Waleska Shaver MD        nitroglycerin (NITRO-BID) 2 % ointment 0.5 inch 0.5 inch Topical 4 times per day Zac Garg MD   0.5 inch at 21 1734       SocialHistory:   Social History     Tobacco Use    Smoking status: Current Every Day Smoker     Packs/day: 0.25    Smokeless tobacco: Never Used   Substance Use Topics    Alcohol use: Yes     Comment: socially        Family History:   Family History   Problem Relation Age of Onset    No Known Problems Father     No Known Problems Mother     No Known Problems Paternal Grandfather     No Known Problems Paternal Grandmother     No Known Problems Maternal Grandmother     No Known Problems Maternal Grandfather     No Known Problems Brother     No Known Problems Sister     No Known Problems Other     Breast Cancer Neg Hx     Cancer Neg Hx     Colon Cancer Neg Hx     Diabetes Neg Hx     Eclampsia Neg Hx     Hypertension Neg Hx     Ovarian Cancer Neg Hx      Labor Neg Hx     Spont Abortions Neg Hx     Stroke Neg Hx            Review of Systems    10 point review of systems is negative except as noted in history of present illness    Physical Examination:  BP (!) 148/71   Pulse 67   Temp 97.6 °F (36.4 °C) (Temporal)   Resp 18   Ht 5' 6\" (1.676 m)   Wt 150 lb (68 kg)   SpO2 100%   BMI 24.21 kg/m²    No intake or output data in the 24 hours ending 21 1902  Patient Vitals for the past 96 hrs (Last 3 readings):   Weight   21 1424 150 lb (68 kg)        Physical Exam     patient is alert oriented x3 not diaphoretic. She is hypertensive with systolic blood pressure around 180. Heart rate is in the 80s and 90s. Oxygen saturation 98% on 2 L nasal cannula.   Did not appreciate jugular venous distention no carotid bruit lungs clear heart sounds regular without murmur rub or gallop abdomen soft nontender no masses no bruits extremities show no edema distal pulses symmetric 2+ out of 4 plus neurologically she is alert oriented x3 has no gross cranial nerve deficits, moves all extremities and answers questions appropriately. Skin shows no petechia or rash. Diagnostics:    EKG: Normal sinus rhythm with septal ST elevation in the inferior leads and subtle ST depression in the anterior precordial leads. Lab Data:  BMP:  Recent Labs     08/25/21  1445   *   K 4.4   CL 96   CO2 22   BUN 9   CREATININE 0.68   LABGLOM >60.0       Cardiac Enzymes:  Recent Labs     08/25/21  1445   TROPONINI 0.792*       CBC:   Lab Results   Component Value Date    WBC 12.6 08/25/2021    RBC 5.74 08/25/2021    HGB 16.3 08/25/2021    HCT 47.6 08/25/2021     08/25/2021       CMP:    Lab Results   Component Value Date     08/25/2021    K 4.4 08/25/2021    CL 96 08/25/2021    CO2 22 08/25/2021    BUN 9 08/25/2021    CREATININE 0.68 08/25/2021    GFRAA >60.0 08/25/2021    LABGLOM >60.0 08/25/2021    GLUCOSE 191 08/25/2021    CALCIUM 9.4 08/25/2021       Hepatic Function Panel:   Lab Results   Component Value Date    ALKPHOS 109 08/25/2021    ALT 44 08/25/2021     08/25/2021    PROT 7.9 08/25/2021    BILITOT <0.2 08/25/2021    LABALBU 4.5 08/25/2021       Magnesium:  No results found for: MG    PT/INR:   Lab Results   Component Value Date    PROTIME 12.3 08/25/2021    INR 0.9 08/25/2021     Recent Labs     08/25/21  1445   INR 0.9        HgBA1c:  No results found for: LABA1C    Lipid Profile:  No results found for: TRIG, HDL, LDLCALC, LDLDIRECT, LABVLDL    TSH:    Lab Results   Component Value Date    TSH 3.230 02/05/2019       ABG:  No results found for: PH, PO2, PCO2    PRO-BNP: No results found for: PROBNP     Radiology:   XR CHEST PORTABLE    Result Date: 8/25/2021  EXAMINATION: CHEST PORTABLE VIEW  CLINICAL HISTORY: Midsternal chest pain, code purple COMPARISONS: None  FINDINGS: Single  views of the chest is submitted. The cardiac silhouette is of normal size configuration. Pulmonary vascular unremarkable. Right sided trachea. No focal infiltrates. No Pneumothoraces. NO ACUTE ACTIVE CARDIOPULMONARY PROCESS        Impression:    Active Problems:    Acute myocardial infarction [facility])  Resolved Problems:    * No resolved hospital problems. *      Patient Active Problem List   Diagnosis    Diverticulosis    Irregular periods    Uterine leiomyoma    Rash and nonspecific skin eruption    Acute gastritis    Generalized abdominal pain    DUB (dysfunctional uterine bleeding)    Tobacco abuse    Acute myocardial infarction [facility])       Recommendations:  1. Plan is to take patient emergently to the cardiac catheterization laboratory. Procedure risk benefits alternatives discussed, risk discussed included but were not limited to MI, stroke, death, peripheral vascular compromise and allergic reaction to dye. Ongoing cardiology care will be directed by Dr. Radhames Carlisle. 2. Additional recommendations to be based on clinical course and findings of coronary angiography. 3. We will need help with nicotine cessation. Orders Placed This Encounter   Procedures    COVID-19, Rapid    XR CHEST PORTABLE    CBC Auto Differential    Comprehensive Metabolic Panel    Troponin    Protime-INR    Basic Metabolic Panel    CBC    Troponin    ADULT DIET; Regular;  No Added Salt (3-4 gm)    Verify informed consent    Verify pre-procedure history and physical completed    Vital signs    Notify physician for    Notify physician - hemoglobin    Strict Bedrest    Ambulate patient    Intake and output    Tobacco cessation education    Check Cath Site and Distal Pulses    Puncture site care    Verify metformin (GLUCOPHAGE) Discontinued    Nursing communication for POCT - activated clotting time    If any sign of bleeding or hematoma at puncture site do the following:    Maintain Dry Sterile Dressing    Remove arterial sheath from extremity as follows:    Keep head of bed 30 degrees or less    Telemetry monitoring - continuous duration    Full code    Inpatient consult to Cardiac Rehab    Initiate Oxygen Therapy Protocol    Initiate Oxygen Therapy Protocol    EKG 12 Lead    EKG 12 lead    EKG 12 Lead    EKG 12 Lead    Echo 2d w doppler w color w contrast    PATIENT STATUS (FROM ED OR OR/PROCEDURAL) Inpatient       Discussed with patient and nursing.     Electronically signed by Ranjeet Mendez MD, [facility] on 8/25/2021 at 7:02 PM                                      Ranjeet Mendez M.D.                              [phone]

## 2021-08-25 NOTE — ED NOTES
Pt states that she is having pain in chest. Pt states no radiation to left arm. Pt states radiation to back. Pt states that gums hurt. Pt denies any history of HTN. Pt states that the pain started at 4 am this morning. Pt states that she has had the pain 3 times. Pt pain is a 10/10 on pain scale. Pt states that she has \"hot flashes\" and felt the symptoms were due to menopause. Pt is alert and oriented times 3 upon arrival to ER.       Ladan Jasso RN  08/25/21 7739 JAMES Estrella  08/25/21 8513

## 2021-08-26 VITALS
HEIGHT: 66 IN | OXYGEN SATURATION: 98 % | HEART RATE: 78 BPM | TEMPERATURE: 99.6 F | DIASTOLIC BLOOD PRESSURE: 63 MMHG | RESPIRATION RATE: 22 BRPM | WEIGHT: 150 LBS | BODY MASS INDEX: 24.11 KG/M2 | SYSTOLIC BLOOD PRESSURE: 149 MMHG

## 2021-08-26 LAB
ANION GAP SERPL CALCULATED.3IONS-SCNC: 13 MEQ/L (ref 9–15)
BUN BLDV-MCNC: 5 MG/DL (ref 6–20)
CALCIUM SERPL-MCNC: 8.5 MG/DL (ref 8.5–9.9)
CHLORIDE BLD-SCNC: 104 MEQ/L (ref 95–107)
CO2: 21 MEQ/L (ref 20–31)
CREAT SERPL-MCNC: 0.61 MG/DL (ref 0.5–0.9)
EKG ATRIAL RATE: 85 BPM
EKG P AXIS: 31 DEGREES
EKG P-R INTERVAL: 126 MS
EKG Q-T INTERVAL: 364 MS
EKG QRS DURATION: 82 MS
EKG QTC CALCULATION (BAZETT): 433 MS
EKG R AXIS: 20 DEGREES
EKG T AXIS: 71 DEGREES
EKG VENTRICULAR RATE: 85 BPM
GFR AFRICAN AMERICAN: >60
GFR AFRICAN AMERICAN: >60
GFR NON-AFRICAN AMERICAN: >60
GFR NON-AFRICAN AMERICAN: >60
GLUCOSE BLD-MCNC: 148 MG/DL (ref 70–99)
HCT VFR BLD CALC: 39.5 % (ref 37–47)
HEMOGLOBIN: 13.2 G/DL (ref 12–16)
LV EF: 58 %
LVEF MODALITY: NORMAL
MCH RBC QN AUTO: 28 PG (ref 27–31.3)
MCHC RBC AUTO-ENTMCNC: 33.5 % (ref 33–37)
MCV RBC AUTO: 83.5 FL (ref 82–100)
PDW BLD-RTO: 14.6 % (ref 11.5–14.5)
PERFORMED ON: NORMAL
PLATELET # BLD: 308 K/UL (ref 130–400)
POC CREATININE: 0.7 MG/DL (ref 0.6–1.1)
POC SAMPLE TYPE: NORMAL
POTASSIUM SERPL-SCNC: 3.9 MEQ/L (ref 3.4–4.9)
RBC # BLD: 4.73 M/UL (ref 4.2–5.4)
SODIUM BLD-SCNC: 138 MEQ/L (ref 135–144)
TROPONIN: 1.58 NG/ML (ref 0–0.01)
WBC # BLD: 12.3 K/UL (ref 4.8–10.8)

## 2021-08-26 PROCEDURE — 84484 ASSAY OF TROPONIN QUANT: CPT

## 2021-08-26 PROCEDURE — 2580000003 HC RX 258: Performed by: INTERNAL MEDICINE

## 2021-08-26 PROCEDURE — 85027 COMPLETE CBC AUTOMATED: CPT

## 2021-08-26 PROCEDURE — 80048 BASIC METABOLIC PNL TOTAL CA: CPT

## 2021-08-26 PROCEDURE — 36415 COLL VENOUS BLD VENIPUNCTURE: CPT

## 2021-08-26 PROCEDURE — 6370000000 HC RX 637 (ALT 250 FOR IP): Performed by: INTERNAL MEDICINE

## 2021-08-26 PROCEDURE — 93306 TTE W/DOPPLER COMPLETE: CPT

## 2021-08-26 PROCEDURE — 93010 ELECTROCARDIOGRAM REPORT: CPT | Performed by: INTERNAL MEDICINE

## 2021-08-26 RX ORDER — CARVEDILOL 6.25 MG/1
6.25 TABLET ORAL 2 TIMES DAILY WITH MEALS
Qty: 60 TABLET | Refills: 3 | Status: SHIPPED | OUTPATIENT
Start: 2021-08-26 | End: 2021-11-02 | Stop reason: DRUGHIGH

## 2021-08-26 RX ORDER — CLOPIDOGREL BISULFATE 75 MG/1
75 TABLET ORAL DAILY
Status: DISCONTINUED | OUTPATIENT
Start: 2021-08-27 | End: 2021-08-26 | Stop reason: HOSPADM

## 2021-08-26 RX ORDER — ASPIRIN 81 MG/1
81 TABLET, CHEWABLE ORAL DAILY
Qty: 30 TABLET | Refills: 3 | Status: SHIPPED | OUTPATIENT
Start: 2021-08-27 | End: 2022-05-23 | Stop reason: SDUPTHER

## 2021-08-26 RX ORDER — ATORVASTATIN CALCIUM 80 MG/1
80 TABLET, FILM COATED ORAL NIGHTLY
Qty: 30 TABLET | Refills: 3 | Status: SHIPPED | OUTPATIENT
Start: 2021-08-26 | End: 2021-11-02 | Stop reason: ALTCHOICE

## 2021-08-26 RX ORDER — NITROGLYCERIN 0.4 MG/1
TABLET SUBLINGUAL
Qty: 25 TABLET | Refills: 3 | Status: SHIPPED | OUTPATIENT
Start: 2021-08-26 | End: 2022-05-23 | Stop reason: SDUPTHER

## 2021-08-26 RX ORDER — LOSARTAN POTASSIUM 25 MG/1
50 TABLET ORAL DAILY
Qty: 30 TABLET | Refills: 5 | Status: SHIPPED | OUTPATIENT
Start: 2021-08-26 | End: 2021-10-30

## 2021-08-26 RX ORDER — CLOPIDOGREL BISULFATE 75 MG/1
75 TABLET ORAL DAILY
Qty: 30 TABLET | Refills: 3 | Status: SHIPPED | OUTPATIENT
Start: 2021-08-27 | End: 2022-05-23 | Stop reason: SDUPTHER

## 2021-08-26 RX ADMIN — NITROGLYCERIN 0.5 INCH: 20 OINTMENT TOPICAL at 00:28

## 2021-08-26 RX ADMIN — VALSARTAN 80 MG: 80 TABLET, FILM COATED ORAL at 08:23

## 2021-08-26 RX ADMIN — ASPIRIN 81 MG: 81 TABLET, CHEWABLE ORAL at 08:23

## 2021-08-26 RX ADMIN — ACETAMINOPHEN 650 MG: 325 TABLET ORAL at 01:57

## 2021-08-26 RX ADMIN — CARVEDILOL 6.25 MG: 6.25 TABLET, FILM COATED ORAL at 08:23

## 2021-08-26 RX ADMIN — PANTOPRAZOLE SODIUM 40 MG: 40 TABLET, DELAYED RELEASE ORAL at 05:15

## 2021-08-26 RX ADMIN — NITROGLYCERIN 0.5 INCH: 20 OINTMENT TOPICAL at 13:18

## 2021-08-26 RX ADMIN — NITROGLYCERIN 0.5 INCH: 20 OINTMENT TOPICAL at 05:15

## 2021-08-26 RX ADMIN — SODIUM CHLORIDE, PRESERVATIVE FREE 10 ML: 5 INJECTION INTRAVENOUS at 08:27

## 2021-08-26 RX ADMIN — TICAGRELOR 90 MG: 90 TABLET ORAL at 02:00

## 2021-08-26 ASSESSMENT — PAIN DESCRIPTION - PROGRESSION

## 2021-08-26 ASSESSMENT — PAIN SCALES - GENERAL
PAINLEVEL_OUTOF10: 0
PAINLEVEL_OUTOF10: 5
PAINLEVEL_OUTOF10: 0
PAINLEVEL_OUTOF10: 2
PAINLEVEL_OUTOF10: 0
PAINLEVEL_OUTOF10: 0
PAINLEVEL_OUTOF10: 5
PAINLEVEL_OUTOF10: 0
PAINLEVEL_OUTOF10: 3
PAINLEVEL_OUTOF10: 0

## 2021-08-26 NOTE — CONSULTS
Cardiac Rehab Consult Note  Name: Redd Bennett  Age: 48 y.o. Gender: female    Chief Complaint:Chest Pain    Primary Care Provider: Abhijit Casas PA-C  InpatientTreatment Team: Treatment Team: : Vanda Bo; Registered Nurse: Santos Osorio, RN; Registered Nurse: Haylie Levi, RN; Utilization Reviewer: Frank San RN  Admission Date: 8/25/2021    Consult Received from Dr. Latasha Buenrostro. Reason for consult MI-PCI-Angina. Patient visited at bedside. Program and benefits introduced. Brochures given. Patient would consider cardiac rehab. Active Problems:    Acute myocardial infarction [facility])  Resolved Problems:    * No resolved hospital problems. *       Plan: f/u as outpatient     All of pt questions were answered. Case will be discussed with physician when appropriate.      Electronically signed by Marina Merlin on 8/26/2021 at 11:44 AM

## 2021-08-26 NOTE — CARE COORDINATION
Definition and description of a heart attack explained to pt. and sister. Brief overview of the heart anatomy reviewed with pt. CAD progression discussed. During a MI, lack of oxygen and damage to heart muscle explained. Symptoms of a MI reviewed. Pt. verbalizes that they experienced:  Intermittent chest pain for several days and it occurred with activity. It became constant, radiated to her back and she came to ER. Post MI complications reviewed including arrhythmias, pumping problems, inflammation (pericarditis). Hospital course reviewed including testing: Lab work, B/P, ECG, ECHO, Stress testing, and Heart Cath. Treatments also reviewed from medication, angioplasty and stenting, to CABG. Patient had: Emergent PCI and stenting. Plan post discharge includes: Follow up with Dr. Nasir Bose and cardiac rehab. Physical activity discussed including cardiac rehab. Pt advised to follow their physician's discharge instructions for activity restrictions, if any. Risk factors reviewed including: smoking, high cholesterol, HTN, DM, obesity, sedentary lifestyle, and stress. Pt. encouraged to make lifestyle changes to improve their health and lower these risk factors. Stress and depression were also discussed. S/S depression reviewed as well as encouragement to talk with someone if symptoms are noticed. Importance of follow up with the cardiologist reinforced. MI Zone booklet also reviewed. Goal is to keep patient in the \"green\" zone. She verbalizes understanding to call the doctor ASAP when experiencing symptoms in the \"yellow\" zone. Instructed to call 911 when S/S of \"red\" zone begin. Reminder to never drive after taking nitroglycerine. Copy of MI booklet and zone pamphlet provided to the patient for review. Patient and sister deny further questions at this time.    Electronically signed by Patricio Ovalle RN on 8/26/2021 at 1:41 PM

## 2021-08-26 NOTE — PROGRESS NOTES
1900 upon assessment of surgical site during handoff report there is some shadowing present on dressing. Day shift nurse states this is new. Measures approximately 1 inch around. Area has been marked to observe worsening bleeding. 2300 new bag of NACL hung, rate adjusted to 100ml/hr per administration instructions. (see MAR)    0000 reassessment unchanged, no new drainage at surgical site    0050 critical troponin reported by lab. [facility] on call dr. fowler report. No new orders. 0200 patient ambulated to toilet, experienced pain at surgical site when applying pressure while walking. Tolerated ambulation fairly well. Due to pain in leg patient requested to not ambulate anymore throughout the night. 0400 reassessment unchanged. Surgical site remains soft with minimal shadowing on dressing. 0710 handoff report given to Renato Robles RN at bedside.

## 2021-08-26 NOTE — CARE COORDINATION
[facility] [place] Case Management Initial Discharge Assessment    Met with Patient to discuss discharge plan. PCP: Ileana Chakraborty PA-C                                Date of Last Visit: 2019 - has not been since pandemic    If no PCP, list provided? N/A    Discharge Planning    Living Arrangements: independently at home    Who do you live with? Sister    Who helps you with your care:  self    If lives at home:     Do you have any barriers navigating in your home? no    Patient can perform ADL? Yes    Current Services (outpatient and in home) :  None    Dialysis: No    Is transportation available to get to your appointments? Yes   Pt drives  DME Equipment:  no    Respiratory equipment: None    Respiratory provider:  no     Pharmacy:  yes - [address] with Medication Assistance Program?  No      Patient agreeable to [address]? Declined and N/A    Patient agreeable to SNF/Rehab? N/A    Other discharge needs identified? Cardiac Rehab    Does Patient Have a High-Risk for Readmission Diagnosis (CHF, PN, MI, COPD)? Yes    If Yes,      Consult with cardiologist? Yes   Cardiac Rehab referral if EF <35%? Yes   Consult with Pharmacy for medication assessment prior to discharge? Yes   Consult with Behavioral health to aid in depression, anxiety, or coping issues? N/A   Palliative Care Consult? No   Pulmonary Rehab order for COPD, PN, and CHF (if EF > 35%)? N/A    Does patient have a reliable scale and know how to read it (for CHF)? N/A   Nutrition consult for CHF? N/A   Respiratory therapy consult that includes bedside instruction on administration of nebulizers and/or inhalers, and assessment of oxygen and equipment needs in the home? N/A    Initial Discharge Plan? (Note: please see concurrent daily documentation for any updates after initial note). Plans to return home w sister. I gave patient a Brilinta discount paper and Deisy Patterson was going to give her free month.  Patient states  Corwin Sampson is changing her to Plavix for discharge. Pt was seen by cardiac rehab and CTN for her diagnosis also and teaching done.       Readmission Risk              Risk of Unplanned Readmission:  8         Electronically signed by Missael Anand on 8/26/2021 at 12:09 PM

## 2021-08-26 NOTE — DISCHARGE SUMMARY
Hospital  Discharge Summary    Ganesh Dixon  :  1970  MRN:  [id number]    Admit date:  2021  Discharge date:  2021    Admitting Physician:  Regla Dong MD  Primary Care Physician:  Kisha Negron PA-C      Discharge Diagnoses: Active Problems:    Acute myocardial infarction [facility])  Resolved Problems:    * No resolved hospital problems. *      PROCEDURES:    Patient presented with acute coronary syndrome inferior ST segment elevation myocardial infarction. Has history of hypertension and nicotine addiction. Patient underwent emergent coronary angiography PCI and stenting of totally occluded relatively small right coronary artery. LV ejection fraction is about 50%. Left anterior descending artery has 50 to 60% proximal and mid stenosis. Distal circumflex has about 70% stenosis, prior to a small posterolateral ventricular branch. Postprocedure course was unremarkable. Extensive discussion today with patient and sister regarding risk factor modification. There may be some insurance issues, her insurance may not cover cardiac rehabilitation, but cardiac rehabilitation has been consulted. Talked about potential side effects of Brilinta the importance of dual antiplatelet therapy and risk factor modification. Patient is already complaining of shortness of breath and would like to switch to Plavix. Will give Plavix 600 mg p.o. today followed by 75 mg p.o. daily. Hospital Course:   Ganesh Dixon is a 48 y.o. female that was admitted and treated at [facility] for the following medical issues: Active Problems:    Acute myocardial infarction [facility])  Resolved Problems:    * No resolved hospital problems. *        Physical Exam     Patient is alert and oriented x3 in no distress has not had any chest discomfort.   Lungs are clear heart sounds are regular no murmur rub or gallop right groin is soft and minimally tender to touch no hematoma or ecchymosis distal pulses are strong to palpation. Neurologically she is alert oriented x3 grossly nonfocal.    EKG shows inferior ST elevation no significant improvement since admission. Patient will be discharged home on dual antiplatelet therapy statins angiotensin receptor blocker and beta-blocker. Outpatient follow-up has been scheduled. Discharge time 45 minutes    LAB:  Recent Labs     08/25/21  1445 08/26/21  0602   WBC 12.6* 12.3*   HGB 16.3* 13.2   HCT 47.6* 39.5    308     Recent Labs     08/25/21  1445 08/25/21  1459 08/26/21  0602   *  --  138   K 4.4  --  3.9   CL 96  --  104   CO2 22  --  21   BUN 9  --  5*   CREATININE 0.68 0.7 0.61   CALCIUM 9.4  --  8.5     Recent Labs     08/25/21  1445   *   ALT 44*   BILITOT <0.2   ALKPHOS 109     Recent Labs     08/25/21  1445   INR 0.9     Recent Labs     08/25/21  1445 08/25/21  1733 08/26/21  0007   TROPONINI 0.792* 0.631* 1.580*     @LABRCNT(FREE T4:3, TSH:3)  @LABRCNT(LIPID:3) Invalid input(s): BNP;3    Imaging Studies:    XR CHEST PORTABLE    Result Date: 8/25/2021  EXAMINATION: CHEST PORTABLE VIEW  CLINICAL HISTORY: Midsternal chest pain, code purple COMPARISONS: None  FINDINGS: Single  views of the chest is submitted. The cardiac silhouette is of normal size configuration. Pulmonary vascular unremarkable. Right sided trachea. No focal infiltrates. No Pneumothoraces. NO ACUTE ACTIVE CARDIOPULMONARY PROCESS      Discharge Medications:  Current Discharge Medication List           Details   aspirin 81 MG chewable tablet Take 1 tablet by mouth daily  Qty: 30 tablet, Refills: 3      nitroGLYCERIN (NITROSTAT) 0.4 MG SL tablet up to max of 3 total doses. If no relief after 1 dose, call 911.   Qty: 25 tablet, Refills: 3      atorvastatin (LIPITOR) 80 MG tablet Take 1 tablet by mouth nightly  Qty: 30 tablet, Refills: 3      carvedilol (COREG) 6.25 MG tablet Take 1 tablet by mouth 2 times daily (with meals)  Qty: 60 tablet, Refills: 3      clopidogrel (PLAVIX) 75 MG tablet Take 1 tablet by mouth daily  Qty: 30 tablet, Refills: 3      losartan (COZAAR) 25 MG tablet Take 2 tablets by mouth daily  Qty: 30 tablet, Refills: 5             Discharge Weight  Weight change:      Disposition:     Follow up [facility] 1 week    Follow up labs : BMP in one week    Follow up with other consultant physicians at their directions. Condition at discharge: Pt was medically stable at the time of discharge. Activity: no heavy lifting for 1  week    Total time taken for discharging this patient: 40 minutes. Greater than 70% of time was spent focused exclusively on this patient. Time was taken to review chart, discuss plans with consultants, reconciling medications, discussing plan answering questions with patient.      Signed:  Sofy Moe MD  8/26/2021, 12:56 PM  ----------------------------------------------------------------------------------------------------------------------

## 2021-08-26 NOTE — PROGRESS NOTES
CLINICAL PHARMACY NOTE: MEDS TO BEDS    Total # of Prescriptions Filled: 6   The following medications were delivered to the patient:  ·     Additional Documentation:

## 2021-08-26 NOTE — PROGRESS NOTES
0800- assisted patient up to ambulate to the toilet in the room. Patient used walker but was steady, needing assistance only with wires. Patient stated some pain with movement but stated it was better than yesterday. Otherwise denies any pain. Surgical site covered with dressing, no new blood noted, soft, nontender. Patient denies any pain at site. Patient denies any complaints at this time. 1000- assisted patient up to ambulate again to the toilet, used walker and was steady again. 1300- Dr. Jorge Alvarado was in to see patient, educated patient about smoking cessation. Patient is ok'd to be discharged.

## 2021-08-27 LAB
PERFORMED ON: ABNORMAL
POC ACTIVATED CLOTTING TIME KAOLIN: 186 SEC (ref 82–152)
POC ACTIVATED CLOTTING TIME KAOLIN: 213 SEC (ref 82–152)
POC ACTIVATED CLOTTING TIME KAOLIN: 252 SEC (ref 82–152)
POC SAMPLE TYPE: ABNORMAL

## 2021-08-30 ENCOUNTER — OFFICE VISIT (OUTPATIENT)
Dept: FAMILY MEDICINE CLINIC | Age: 51
End: 2021-08-30
Payer: COMMERCIAL

## 2021-08-30 VITALS
BODY MASS INDEX: 25.61 KG/M2 | WEIGHT: 150 LBS | HEIGHT: 64 IN | OXYGEN SATURATION: 99 % | SYSTOLIC BLOOD PRESSURE: 138 MMHG | DIASTOLIC BLOOD PRESSURE: 82 MMHG | TEMPERATURE: 97.4 F | HEART RATE: 79 BPM

## 2021-08-30 DIAGNOSIS — K13.21 LEUKOPLAKIA OF ORAL MUCOSA: ICD-10-CM

## 2021-08-30 DIAGNOSIS — J02.9 SORE THROAT: Primary | ICD-10-CM

## 2021-08-30 PROCEDURE — 3017F COLORECTAL CA SCREEN DOC REV: CPT | Performed by: PHYSICIAN ASSISTANT

## 2021-08-30 PROCEDURE — G8427 DOCREV CUR MEDS BY ELIG CLIN: HCPCS | Performed by: PHYSICIAN ASSISTANT

## 2021-08-30 PROCEDURE — 4004F PT TOBACCO SCREEN RCVD TLK: CPT | Performed by: PHYSICIAN ASSISTANT

## 2021-08-30 PROCEDURE — 87880 STREP A ASSAY W/OPTIC: CPT | Performed by: PHYSICIAN ASSISTANT

## 2021-08-30 PROCEDURE — G8419 CALC BMI OUT NRM PARAM NOF/U: HCPCS | Performed by: PHYSICIAN ASSISTANT

## 2021-08-30 PROCEDURE — 99213 OFFICE O/P EST LOW 20 MIN: CPT | Performed by: PHYSICIAN ASSISTANT

## 2021-08-30 PROCEDURE — 1111F DSCHRG MED/CURRENT MED MERGE: CPT | Performed by: PHYSICIAN ASSISTANT

## 2021-08-30 ASSESSMENT — ENCOUNTER SYMPTOMS
SWOLLEN GLANDS: 0
COUGH: 0
VISUAL CHANGE: 0
VOMITING: 0
NAUSEA: 0
ABDOMINAL PAIN: 0
CHANGE IN BOWEL HABIT: 0
SORE THROAT: 0

## 2021-08-30 ASSESSMENT — VISUAL ACUITY: OU: 1

## 2021-08-30 NOTE — PROGRESS NOTES
[address] [facility] Encounter  CHIEF COMPLAINT       Chief Complaint   Patient presents with    Pharyngitis     patient was in the hospital for a day in the ICU for a day, patient stated they put a stent in         99 Shawn Arias is a 48 y.o. female who presents with:  Pharyngitis  This is a new (happened right after her MI. ) problem. Episode onset: Wednsday. The problem has been unchanged. Associated symptoms include a fever (patient reports she captured 101 F temp at home. ). Pertinent negatives include no abdominal pain, anorexia, arthralgias, change in bowel habit, chest pain, chills, congestion, coughing, diaphoresis, fatigue, headaches, joint swelling, myalgias, nausea, neck pain, numbness, rash, sore throat, swollen glands, urinary symptoms, vertigo, visual change, vomiting or weakness. Nothing aggravates the symptoms. She has tried nothing for the symptoms. REVIEW OF SYSTEMS     Review of Systems   Constitutional: Positive for fever (patient reports she captured 101 F temp at home. ). Negative for chills, diaphoresis and fatigue. HENT: Negative for congestion and sore throat. Respiratory: Negative for cough. Cardiovascular: Negative for chest pain. Gastrointestinal: Negative for abdominal pain, anorexia, change in bowel habit, nausea and vomiting. Musculoskeletal: Negative for arthralgias, joint swelling, myalgias and neck pain. Skin: Negative for rash. Neurological: Negative for vertigo, weakness, numbness and headaches. PAST MEDICAL HISTORY         Diagnosis Date    Abnormal Pap smear of cervix     Diverticular disease     Diverticulitis      SURGICAL HISTORY     Patient  has a past surgical history that includes Dilation & curettage.   CURRENT MEDICATIONS       Previous Medications    ASPIRIN 81 MG CHEWABLE TABLET    Take 1 tablet by mouth daily    ATORVASTATIN (LIPITOR) 80 MG TABLET    Take 1 tablet by mouth nightly    CARVEDILOL (COREG) 6.25 MG TABLET    Take 1 tablet by mouth 2 times daily (with meals)    CLOPIDOGREL (PLAVIX) 75 MG TABLET    Take 1 tablet by mouth daily    LOSARTAN (COZAAR) 25 MG TABLET    Take 2 tablets by mouth daily    NITROGLYCERIN (NITROSTAT) 0.4 MG SL TABLET    up to max of 3 total doses. If no relief after 1 dose, call 911. ALLERGIES     Patient is is allergic to sulfa antibiotics. FAMILY HISTORY     Patient'sfamily history includes No Known Problems in her brother, father, maternal grandfather, maternal grandmother, mother, paternal grandfather, paternal grandmother, sister, and another family member. SOCIAL HISTORY     Patient  reports that she has been smoking. She has been smoking about 0.25 packs per day. She has never used smokeless tobacco. She reports current alcohol use. She reports that she does not use drugs. PHYSICAL EXAM     VITALS  BP: 138/82, Temp: 97.4 °F (36.3 °C), Pulse: 79,  , SpO2: 99 %  Physical Exam  Vitals and nursing note reviewed. Constitutional:       General: She is awake. She is not in acute distress. Appearance: Normal appearance. She is well-developed. She is not ill-appearing, toxic-appearing or diaphoretic. HENT:      Head: Normocephalic and atraumatic. Right Ear: Hearing and external ear normal.      Left Ear: Hearing and external ear normal.      Nose: Nose normal.      Mouth/Throat:      Mouth: Mucous membranes are moist. Oral lesions present. Palate: Lesions present. Pharynx: Oropharynx is clear. Uvula midline. Posterior oropharyngeal erythema present. No pharyngeal swelling, oropharyngeal exudate or uvula swelling. Comments: White spots on the patient's upper hard palate. Spots do not scrap off. Changes to the tongue is not due to thrush. Unable to scrap. Eyes:      General: Lids are normal. Vision grossly intact. Gaze aligned appropriately.       Conjunctiva/sclera: Conjunctivae normal.   Cardiovascular:      Rate and Rhythm: Normal rate and regular rhythm. Pulses: Normal pulses. Heart sounds: Normal heart sounds, S1 normal and S2 normal.   Pulmonary:      Effort: Pulmonary effort is normal.      Breath sounds: Normal breath sounds and air entry. Musculoskeletal:      Cervical back: Normal range of motion. Skin:     General: Skin is warm. Capillary Refill: Capillary refill takes less than 2 seconds. Neurological:      Mental Status: She is alert and oriented to person, place, and time. Gait: Gait is intact. Psychiatric:         Attention and Perception: Attention normal.         Mood and Affect: Mood normal.         Speech: Speech normal.         Behavior: Behavior normal. Behavior is cooperative. READY CARE COURSE   Labs:  No results found for this visit on 08/30/21. IMAGING:  No orders to display     Scheduled Meds:  Continuous Infusions:  PRN Meds:. PROCEDURES:  FINAL IMPRESSION      1. Sore throat    2. Leukoplakia of oral mucosa      DISPOSITION/PLAN   1,2. Negative strep. Will send for cultures. Obtained COVID-19 test per patient's request. Patient's lesions resemble that of leukoplakia. Possible trigger from her rest stress. Patient is able to eat, drink, and swallow without difficulty. The lesions do no resemble thrush. Unable to scrap the lesions off. Discussed signs and symptoms which require immediate follow-up in ED/call to 911. Patient verbalized understanding. On this date 8/30/2021 I have spent 20 minutes reviewing previous notes, test results and face to face with the patient discussing the diagnosis and importance of compliance with the treatment plan as well as documenting on the day of the visit. PATIENT REFERRED TO:  Return if symptoms worsen or fail to improve. DISCHARGE MEDICATIONS:  New Prescriptions    No medications on file     Cannot display discharge medications since this is not an admission.        Brian Dong

## 2021-08-31 LAB
EKG ATRIAL RATE: 67 BPM
EKG ATRIAL RATE: 76 BPM
EKG P AXIS: 19 DEGREES
EKG P AXIS: 51 DEGREES
EKG P-R INTERVAL: 140 MS
EKG P-R INTERVAL: 150 MS
EKG Q-T INTERVAL: 390 MS
EKG Q-T INTERVAL: 432 MS
EKG QRS DURATION: 76 MS
EKG QRS DURATION: 76 MS
EKG QTC CALCULATION (BAZETT): 438 MS
EKG QTC CALCULATION (BAZETT): 456 MS
EKG R AXIS: 27 DEGREES
EKG R AXIS: 32 DEGREES
EKG T AXIS: 83 DEGREES
EKG T AXIS: 87 DEGREES
EKG VENTRICULAR RATE: 67 BPM
EKG VENTRICULAR RATE: 76 BPM

## 2021-09-02 DIAGNOSIS — B37.0 THRUSH, ORAL: Primary | ICD-10-CM

## 2021-09-02 LAB
ALBUMIN SERPL-MCNC: 3.9 G/DL (ref 3.5–4.6)
ALP BLD-CCNC: 115 U/L (ref 40–130)
ALT SERPL-CCNC: 27 U/L (ref 0–33)
ANION GAP SERPL CALCULATED.3IONS-SCNC: 11 MEQ/L (ref 9–15)
AST SERPL-CCNC: 12 U/L (ref 0–35)
BILIRUB SERPL-MCNC: 0.4 MG/DL (ref 0.2–0.7)
BILIRUBIN DIRECT: <0.2 MG/DL (ref 0–0.4)
BILIRUBIN, INDIRECT: NORMAL MG/DL (ref 0–0.6)
BUN BLDV-MCNC: 12 MG/DL (ref 6–20)
CALCIUM SERPL-MCNC: 9.3 MG/DL (ref 8.5–9.9)
CHLORIDE BLD-SCNC: 101 MEQ/L (ref 95–107)
CO2: 28 MEQ/L (ref 20–31)
CREAT SERPL-MCNC: 0.73 MG/DL (ref 0.5–0.9)
GFR AFRICAN AMERICAN: >60
GFR NON-AFRICAN AMERICAN: >60
GLUCOSE BLD-MCNC: 97 MG/DL (ref 70–99)
HCT VFR BLD CALC: 37.6 % (ref 37–47)
HEMOGLOBIN: 12.7 G/DL (ref 12–16)
MCH RBC QN AUTO: 28.4 PG (ref 27–31.3)
MCHC RBC AUTO-ENTMCNC: 33.8 % (ref 33–37)
MCV RBC AUTO: 84.2 FL (ref 82–100)
PDW BLD-RTO: 14 % (ref 11.5–14.5)
PLATELET # BLD: 403 K/UL (ref 130–400)
POTASSIUM SERPL-SCNC: 4.4 MEQ/L (ref 3.4–4.9)
RBC # BLD: 4.46 M/UL (ref 4.2–5.4)
SODIUM BLD-SCNC: 140 MEQ/L (ref 135–144)
TOTAL PROTEIN: 7.3 G/DL (ref 6.3–8)
WBC # BLD: 9.9 K/UL (ref 4.8–10.8)

## 2021-10-06 ENCOUNTER — HOSPITAL ENCOUNTER (OUTPATIENT)
Dept: ULTRASOUND IMAGING | Age: 51
Discharge: HOME | End: 2021-10-08
Payer: COMMERCIAL

## 2021-10-06 DIAGNOSIS — R10.31 RIGHT INGUINAL PAIN: ICD-10-CM

## 2021-10-06 PROCEDURE — 93926 LOWER EXTREMITY STUDY: CPT

## 2021-10-26 ENCOUNTER — HOSPITAL ENCOUNTER (OUTPATIENT)
Dept: GENERAL RADIOLOGY | Age: 51
Discharge: HOME | End: 2021-10-28
Payer: COMMERCIAL

## 2021-10-26 ENCOUNTER — OFFICE VISIT (OUTPATIENT)
Dept: FAMILY MEDICINE CLINIC | Age: 51
End: 2021-10-26
Payer: COMMERCIAL

## 2021-10-26 VITALS
WEIGHT: 167 LBS | HEART RATE: 83 BPM | HEIGHT: 64 IN | OXYGEN SATURATION: 99 % | BODY MASS INDEX: 28.51 KG/M2 | TEMPERATURE: 98.7 F | DIASTOLIC BLOOD PRESSURE: 78 MMHG | SYSTOLIC BLOOD PRESSURE: 128 MMHG

## 2021-10-26 DIAGNOSIS — R52 PAIN: ICD-10-CM

## 2021-10-26 DIAGNOSIS — Z12.11 COLON CANCER SCREENING: ICD-10-CM

## 2021-10-26 DIAGNOSIS — Z09: Primary | ICD-10-CM

## 2021-10-26 DIAGNOSIS — J01.91 ACUTE RECURRENT SINUSITIS, UNSPECIFIED LOCATION: ICD-10-CM

## 2021-10-26 DIAGNOSIS — I25.2: Primary | ICD-10-CM

## 2021-10-26 PROCEDURE — 99213 OFFICE O/P EST LOW 20 MIN: CPT | Performed by: INTERNAL MEDICINE

## 2021-10-26 PROCEDURE — G8419 CALC BMI OUT NRM PARAM NOF/U: HCPCS | Performed by: INTERNAL MEDICINE

## 2021-10-26 PROCEDURE — 1036F TOBACCO NON-USER: CPT | Performed by: INTERNAL MEDICINE

## 2021-10-26 PROCEDURE — 3017F COLORECTAL CA SCREEN DOC REV: CPT | Performed by: INTERNAL MEDICINE

## 2021-10-26 PROCEDURE — 73630 X-RAY EXAM OF FOOT: CPT

## 2021-10-26 PROCEDURE — G8484 FLU IMMUNIZE NO ADMIN: HCPCS | Performed by: INTERNAL MEDICINE

## 2021-10-26 PROCEDURE — G8427 DOCREV CUR MEDS BY ELIG CLIN: HCPCS | Performed by: INTERNAL MEDICINE

## 2021-10-26 RX ORDER — AZITHROMYCIN 500 MG/1
500 TABLET, FILM COATED ORAL DAILY
Qty: 5 TABLET | Refills: 0 | Status: SHIPPED | OUTPATIENT
Start: 2021-10-26 | End: 2021-10-31

## 2021-10-26 RX ORDER — CARVEDILOL 3.12 MG/1
TABLET ORAL
COMMUNITY
Start: 2021-09-20 | End: 2022-05-23 | Stop reason: SDUPTHER

## 2021-10-26 SDOH — ECONOMIC STABILITY: FOOD INSECURITY: WITHIN THE PAST 12 MONTHS, THE FOOD YOU BOUGHT JUST DIDN'T LAST AND YOU DIDN'T HAVE MONEY TO GET MORE.: NEVER TRUE

## 2021-10-26 SDOH — ECONOMIC STABILITY: FOOD INSECURITY: WITHIN THE PAST 12 MONTHS, YOU WORRIED THAT YOUR FOOD WOULD RUN OUT BEFORE YOU GOT MONEY TO BUY MORE.: NEVER TRUE

## 2021-10-26 SDOH — ECONOMIC STABILITY: TRANSPORTATION INSECURITY
IN THE PAST 12 MONTHS, HAS THE LACK OF TRANSPORTATION KEPT YOU FROM MEDICAL APPOINTMENTS OR FROM GETTING MEDICATIONS?: NO

## 2021-10-26 SDOH — ECONOMIC STABILITY: TRANSPORTATION INSECURITY
IN THE PAST 12 MONTHS, HAS LACK OF TRANSPORTATION KEPT YOU FROM MEETINGS, WORK, OR FROM GETTING THINGS NEEDED FOR DAILY LIVING?: NO

## 2021-10-26 ASSESSMENT — SOCIAL DETERMINANTS OF HEALTH (SDOH): HOW HARD IS IT FOR YOU TO PAY FOR THE VERY BASICS LIKE FOOD, HOUSING, MEDICAL CARE, AND HEATING?: NOT VERY HARD

## 2021-10-26 NOTE — PROGRESS NOTES
(NITROSTAT) 0.4 MG SL tablet up to max of 3 total doses. If no relief after 1 dose, call 911. 25 tablet 3    clopidogrel (PLAVIX) 75 MG tablet Take 1 tablet by mouth daily 30 tablet 3    atorvastatin (LIPITOR) 80 MG tablet Take 1 tablet by mouth nightly (Patient not taking: Reported on 10/26/2021) 30 tablet 3    carvedilol (COREG) 6.25 MG tablet Take 1 tablet by mouth 2 times daily (with meals) (Patient not taking: Reported on 10/26/2021) 60 tablet 3    losartan (COZAAR) 25 MG tablet Take 2 tablets by mouth daily (Patient not taking: Reported on 10/26/2021) 30 tablet 5     No current facility-administered medications on file prior to visit. Review of Systems   Constitutional: Negative for activity change, chills, diaphoresis, fatigue and fever. HENT: Positive for congestion, sinus pressure and sinus pain. Negative for ear discharge, ear pain, rhinorrhea and sore throat. Respiratory: Negative for cough, chest tightness, shortness of breath and wheezing. Cardiovascular: Negative for chest pain, palpitations and leg swelling. Gastrointestinal: Negative for nausea and vomiting. Genitourinary: Negative. Musculoskeletal: Negative. Neurological: Negative for dizziness, syncope, light-headedness and headaches. Objective:   /78 (Site: Left Upper Arm, Position: Sitting, Cuff Size: Medium Adult)   Pulse 83   Temp 98.7 °F (37.1 °C) (Temporal)   Ht 5' 4\" (1.626 m)   Wt 167 lb (75.8 kg)   LMP 05/26/2021 (Exact Date)   SpO2 99%   BMI 28.67 kg/m²     Physical Exam  Constitutional:       General: She is not in acute distress. Appearance: Normal appearance. She is normal weight. She is not diaphoretic. HENT:      Head: Normocephalic and atraumatic. Nose: Congestion present. No rhinorrhea. Eyes:      General:         Right eye: No discharge. Left eye: No discharge. Extraocular Movements: Extraocular movements intact.       Conjunctiva/sclera: Conjunctivae normal.      Pupils: Pupils are equal, round, and reactive to light. Cardiovascular:      Rate and Rhythm: Normal rate and regular rhythm. Pulses: Normal pulses. Heart sounds: Normal heart sounds. No murmur heard. No friction rub. Pulmonary:      Effort: Pulmonary effort is normal. No respiratory distress. Breath sounds: Normal breath sounds. No wheezing or rhonchi. Chest:      Chest wall: No tenderness. Abdominal:      General: Abdomen is flat. Bowel sounds are normal. There is no distension. Palpations: Abdomen is soft. Tenderness: There is no abdominal tenderness. Musculoskeletal:         General: No tenderness. Normal range of motion. Right lower leg: No edema. Left lower leg: No edema. Skin:     General: Skin is warm and dry. Neurological:      General: No focal deficit present. Mental Status: She is alert and oriented to person, place, and time. Mental status is at baseline. Assessment:      Osiris West was seen today for sinus problem and ambulatory Cardiology referral.    Diagnoses and all orders for this visit:    Acute recurrent sinusitis  -     azithromycin (ZITHROMAX) 500 MG tablet; Take 1 tablet by mouth daily for 5 days  -     Tylenol 650 mg q 6 prn  -    Counseled on supportive measures- steam inhalation, liberal hydration, flonase therapy. Encounter for follow-up of acute myocardial infarction       -      Continue on DAPT and statin therapy  -     Anna Aguilera MD, Cardiology, [facility]    HTN  - Controlled  - Continue on Carvedilol 6.25mg bid and Losartan 25mg daily. Colon cancer screening  -     COLONOSCOPY (Screening);  Future        Health Maintenance   Topic Date Due    Hepatitis C screen  Never done    Lipid screen  Never done    HIV screen  Never done    DTaP/Tdap/Td vaccine (1 - Tdap) Never done    Colon cancer screen colonoscopy  Never done    Shingles Vaccine (1 of 2) Never done    Flu vaccine (1) Never done   [facility]

## 2021-10-30 ASSESSMENT — ENCOUNTER SYMPTOMS
SORE THROAT: 0
SINUS PAIN: 1
VOMITING: 0
NAUSEA: 0
CHEST TIGHTNESS: 0
COUGH: 0
RHINORRHEA: 0
SINUS PRESSURE: 1
SHORTNESS OF BREATH: 0
WHEEZING: 0

## 2021-11-02 ENCOUNTER — OFFICE VISIT (OUTPATIENT)
Dept: CARDIOLOGY CLINIC | Age: 51
End: 2021-11-02
Payer: COMMERCIAL

## 2021-11-02 VITALS
BODY MASS INDEX: 28.17 KG/M2 | RESPIRATION RATE: 16 BRPM | DIASTOLIC BLOOD PRESSURE: 76 MMHG | HEIGHT: 64 IN | WEIGHT: 165 LBS | SYSTOLIC BLOOD PRESSURE: 124 MMHG | HEART RATE: 77 BPM | OXYGEN SATURATION: 99 %

## 2021-11-02 DIAGNOSIS — E78.5 DYSLIPIDEMIA: ICD-10-CM

## 2021-11-02 DIAGNOSIS — I21.11 ST ELEVATION MYOCARDIAL INFARCTION INVOLVING RIGHT CORONARY ARTERY (HCC): ICD-10-CM

## 2021-11-02 DIAGNOSIS — I10 ESSENTIAL HYPERTENSION: ICD-10-CM

## 2021-11-02 DIAGNOSIS — I25.10 CORONARY ARTERY DISEASE INVOLVING NATIVE CORONARY ARTERY OF NATIVE HEART WITHOUT ANGINA PECTORIS: Primary | ICD-10-CM

## 2021-11-02 DIAGNOSIS — I65.22 STENOSIS OF LEFT CAROTID ARTERY: ICD-10-CM

## 2021-11-02 PROCEDURE — 3017F COLORECTAL CA SCREEN DOC REV: CPT | Performed by: INTERNAL MEDICINE

## 2021-11-02 PROCEDURE — G8484 FLU IMMUNIZE NO ADMIN: HCPCS | Performed by: INTERNAL MEDICINE

## 2021-11-02 PROCEDURE — G8427 DOCREV CUR MEDS BY ELIG CLIN: HCPCS | Performed by: INTERNAL MEDICINE

## 2021-11-02 PROCEDURE — G8419 CALC BMI OUT NRM PARAM NOF/U: HCPCS | Performed by: INTERNAL MEDICINE

## 2021-11-02 PROCEDURE — 1036F TOBACCO NON-USER: CPT | Performed by: INTERNAL MEDICINE

## 2021-11-02 PROCEDURE — 99204 OFFICE O/P NEW MOD 45 MIN: CPT | Performed by: INTERNAL MEDICINE

## 2021-11-02 RX ORDER — ATORVASTATIN CALCIUM 80 MG/1
40 TABLET, FILM COATED ORAL NIGHTLY
Qty: 30 TABLET | Refills: 3 | Status: SHIPPED | OUTPATIENT
Start: 2021-11-02 | End: 2022-05-23 | Stop reason: SDUPTHER

## 2021-11-02 RX ORDER — VITAMIN B COMPLEX
1 CAPSULE ORAL DAILY
COMMUNITY
End: 2022-05-23 | Stop reason: SDUPTHER

## 2021-11-02 RX ORDER — ASCORBIC ACID 500 MG
500 TABLET ORAL DAILY
COMMUNITY
End: 2022-05-23 | Stop reason: SDUPTHER

## 2021-11-02 ASSESSMENT — ENCOUNTER SYMPTOMS
WHEEZING: 0
BLOOD IN STOOL: 0
RESPIRATORY NEGATIVE: 1
CHEST TIGHTNESS: 0
EYES NEGATIVE: 1
NAUSEA: 0
COUGH: 0
GASTROINTESTINAL NEGATIVE: 1
STRIDOR: 0
SHORTNESS OF BREATH: 0

## 2021-11-02 NOTE — PROGRESS NOTES
NEW PATIENT        Patient: Jamie Amezquita  YOB: 1970  MRN: [id number]    Chief Complaint: STEMI HTN HPL   Chief Complaint   Patient presents with   [facility] Cardiologist     referral Dr. Ivon Zamorano Coronary Artery Disease       CV Data:  10/21 LE Art - negative    Echo EF 55-60 Trace MR  21 STEMI RCA Px ABELARDO. CX distal 70-75%     Subjective/HPI Dr. Jacinta Haley pt changing due to insurance. Pt has had rare pressure when working as a . These symptoms are different than her MI symptoms. Compliant with meds. Pt was taken off statin for unknown reason. Work -   Former smoker- quit 2021. occ etoh  ++FH  Lives with sister.         EKG:    Past Medical History:   Diagnosis Date    Abnormal Pap smear of cervix     CAD (coronary artery disease)     Diverticular disease     Diverticulitis        Past Surgical History:   Procedure Laterality Date    DILATION AND CURETTAGE         Family History   Problem Relation Age of Onset    No Known Problems Father     No Known Problems Mother     No Known Problems Paternal Grandfather     No Known Problems Paternal Grandmother     No Known Problems Maternal Grandmother     No Known Problems Maternal Grandfather     No Known Problems Brother     No Known Problems Sister     No Known Problems Other     Breast Cancer Neg Hx     Cancer Neg Hx     Colon Cancer Neg Hx     Diabetes Neg Hx     Eclampsia Neg Hx     Hypertension Neg Hx     Ovarian Cancer Neg Hx      Labor Neg Hx     Spont Abortions Neg Hx     Stroke Neg Hx        Social History     Socioeconomic History    Marital status: Single     Spouse name: None    Number of children: None    Years of education: None    Highest education level: None   Occupational History    None   Tobacco Use    Smoking status: Former Smoker     Packs/day: 0.25     Quit date: 10/25/2021     Years since quittin.0    Smokeless tobacco: Never Used   Substance and Sexual Activity    Alcohol use: Yes     Comment: socially    Drug use: No    Sexual activity: Not Currently   Other Topics Concern    None   Social History Narrative    None     Social Determinants of Health     Financial Resource Strain: Low Risk     Difficulty of Paying Living Expenses: Not very hard   Food Insecurity: No Food Insecurity    Worried About Running Out of Food in the Last Year: Never true    Geni of Food in the Last Year: Never true   Transportation Needs: No Transportation Needs    Lack of Transportation (Medical): No    Lack of Transportation (Non-Medical): No   Physical Activity:     Days of Exercise per Week:     Minutes of Exercise per Session:    Stress:     Feeling of Stress :    Social Connections:     Frequency of Communication with Friends and Family:     Frequency of Social Gatherings with Friends and Family:     Attends Confucianism Services:     Active Member of Clubs or Organizations:     Attends Club or Organization Meetings:     Marital Status:    Intimate Partner Violence:     Fear of Current or Ex-Partner:     Emotionally Abused:     Physically Abused:     Sexually Abused: Allergies   Allergen Reactions    Sulfa Antibiotics Rash       Current Outpatient Medications   Medication Sig Dispense Refill    ascorbic acid (VITAMIN C) 500 MG tablet Take 500 mg by mouth daily      b complex vitamins capsule Take 1 capsule by mouth daily      atorvastatin (LIPITOR) 80 MG tablet Take 0.5 tablets by mouth nightly 30 tablet 3    carvedilol (COREG) 3.125 MG tablet TAKE 1 TABLET TWICE A DAY      aspirin 81 MG chewable tablet Take 1 tablet by mouth daily 30 tablet 3    nitroGLYCERIN (NITROSTAT) 0.4 MG SL tablet up to max of 3 total doses. If no relief after 1 dose, call 911. 25 tablet 3    clopidogrel (PLAVIX) 75 MG tablet Take 1 tablet by mouth daily 30 tablet 3     No current facility-administered medications for this visit.        Review of Systems:   Review of Systems   Constitutional: Negative. Negative for diaphoresis and fatigue. HENT: Negative. Eyes: Negative. Respiratory: Negative. Negative for cough, chest tightness, shortness of breath, wheezing and stridor. Cardiovascular: Negative. Negative for chest pain, palpitations and leg swelling. Gastrointestinal: Negative. Negative for blood in stool and nausea. Genitourinary: Negative. Musculoskeletal: Negative. Skin: Negative. Neurological: Negative. Negative for dizziness, syncope, weakness and light-headedness. Hematological: Negative. Psychiatric/Behavioral: Negative. Physical Examination:    /76 (Site: Right Upper Arm, Position: Sitting, Cuff Size: Medium Adult)   Pulse 77   Resp 16   Ht 5' 4\" (1.626 m)   Wt 165 lb (74.8 kg)   SpO2 99%   BMI 28.32 kg/m²    Physical Exam   Constitutional: She appears healthy. No distress. HENT:   Normal cephalic and Atraumatic   Eyes: Pupils are equal, round, and reactive to light. Neck: Thyroid normal. No JVD present. No neck adenopathy. No thyromegaly present. Cardiovascular: Normal rate, regular rhythm, normal heart sounds, intact distal pulses and normal pulses. Pulmonary/Chest: Effort normal and breath sounds normal. She has no wheezes. She has no rales. She exhibits no tenderness. Abdominal: Soft. Bowel sounds are normal. There is no abdominal tenderness. Musculoskeletal:         General: No tenderness or edema. Normal range of motion. Cervical back: Normal range of motion and neck supple. Neurological: She is alert and oriented to person, place, and time. Skin: Skin is warm. No cyanosis. Nails show no clubbing.        LABS:  CBC:   Lab Results   Component Value Date    WBC 9.9 09/02/2021    RBC 4.46 09/02/2021    HGB 12.7 09/02/2021    HCT 37.6 09/02/2021    MCV 84.2 09/02/2021    MCH 28.4 09/02/2021    MCHC 33.8 09/02/2021    RDW 14.0 09/02/2021     09/02/2021    MPV 10.1 04/08/2015 Lipids:No results found for: CHOL  No results found for: TRIG  No results found for: HDL  No results found for: LDLCHOLESTEROL, LDLCALC  No results found for: LABVLDL, VLDL  No results found for: CHOLHDLRATIO  CMP:    Lab Results   Component Value Date     09/02/2021    K 4.4 09/02/2021     09/02/2021    CO2 28 09/02/2021    BUN 12 09/02/2021    CREATININE 0.73 09/02/2021    GFRAA >60.0 09/02/2021    LABGLOM >60.0 09/02/2021    GLUCOSE 97 09/02/2021    PROT 7.3 09/02/2021    LABALBU 3.9 09/02/2021    CALCIUM 9.3 09/02/2021    BILITOT 0.4 09/02/2021    ALKPHOS 115 09/02/2021    AST 12 09/02/2021    ALT 27 09/02/2021     BMP:    Lab Results   Component Value Date     09/02/2021    K 4.4 09/02/2021     09/02/2021    CO2 28 09/02/2021    BUN 12 09/02/2021    LABALBU 3.9 09/02/2021    CREATININE 0.73 09/02/2021    CALCIUM 9.3 09/02/2021    GFRAA >60.0 09/02/2021    LABGLOM >60.0 09/02/2021    GLUCOSE 97 09/02/2021     Magnesium:  No results found for: MG  TSH:  Lab Results   Component Value Date    TSH 3.230 02/05/2019             Patient Active Problem List   Diagnosis    Diverticulosis    Irregular periods    Uterine leiomyoma    Rash and nonspecific skin eruption    Acute gastritis    Generalized abdominal pain    DUB (dysfunctional uterine bleeding)    Tobacco abuse    Acute myocardial infarction (HCC)       Medications Discontinued During This Encounter   Medication Reason    atorvastatin (LIPITOR) 80 MG tablet DISCONTINUED BY ANOTHER CLINICIAN    carvedilol (COREG) 6.25 MG tablet DOSE ADJUSTMENT       Modified Medications    Modified Medication Previous Medication    ATORVASTATIN (LIPITOR) 80 MG TABLET atorvastatin (LIPITOR) 80 MG tablet       Take 0.5 tablets by mouth nightly    Take 1 tablet by mouth nightly       Orders Placed This Encounter   Medications    atorvastatin (LIPITOR) 80 MG tablet     Sig: Take 0.5 tablets by mouth nightly     Dispense:  30 tablet     Refill: 3       Assessment/Plan:    1. Coronary artery disease involving native coronary artery of native heart without angina pectoris  Stable no angina. continue meds    2. Essential hypertension  Stable. Continue meds. Low salt diet  - CBC; Future  - Comprehensive Metabolic Panel; Future  - TSH without Reflex; Future  - Magnesium; Future    3. Dyslipidemia  Resume Atorvastatin at lower dose of 40 qd. rechck labs. Low fat diet. - Lipid, Fasting; Future    4. ST elevation myocardial infarction involving right coronary artery ([address]  5. Left Carotid Bruit- CUS       Counseling:  Heart Healthy Lifestyle, Improve BMI, Low Salt Diet, Take Precautions to Prevent Falls and Walk Daily    Return in about 3 months (around 2/2/2022).     Electronically signed by Vincent Gray MD on 11/2/2021 at 2:25 PM

## 2021-11-12 ENCOUNTER — HOSPITAL ENCOUNTER (INPATIENT)
Age: 51
LOS: 5 days | Discharge: HOME | DRG: 036 | End: 2021-11-17
Attending: STUDENT IN AN ORGANIZED HEALTH CARE EDUCATION/TRAINING PROGRAM | Admitting: INTERNAL MEDICINE
Payer: COMMERCIAL

## 2021-11-12 ENCOUNTER — OFFICE VISIT (OUTPATIENT)
Dept: CARDIOLOGY CLINIC | Age: 51
End: 2021-11-12
Payer: COMMERCIAL

## 2021-11-12 ENCOUNTER — HOSPITAL ENCOUNTER (OUTPATIENT)
Dept: ULTRASOUND IMAGING | Age: 51
Discharge: HOME | DRG: 036 | End: 2021-11-14
Payer: COMMERCIAL

## 2021-11-12 ENCOUNTER — TELEPHONE (OUTPATIENT)
Dept: CARDIOLOGY CLINIC | Age: 51
End: 2021-11-12

## 2021-11-12 VITALS
HEIGHT: 64 IN | SYSTOLIC BLOOD PRESSURE: 126 MMHG | WEIGHT: 165 LBS | HEART RATE: 72 BPM | DIASTOLIC BLOOD PRESSURE: 76 MMHG | OXYGEN SATURATION: 98 % | RESPIRATION RATE: 16 BRPM | BODY MASS INDEX: 28.17 KG/M2

## 2021-11-12 DIAGNOSIS — E78.5 DYSLIPIDEMIA: ICD-10-CM

## 2021-11-12 DIAGNOSIS — I10 ESSENTIAL HYPERTENSION: ICD-10-CM

## 2021-11-12 DIAGNOSIS — I65.22 STENOSIS OF LEFT CAROTID ARTERY: ICD-10-CM

## 2021-11-12 DIAGNOSIS — I65.23 BILATERAL CAROTID ARTERY STENOSIS: ICD-10-CM

## 2021-11-12 DIAGNOSIS — I65.23 CAROTID OCCLUSION, BILATERAL: Primary | ICD-10-CM

## 2021-11-12 DIAGNOSIS — I21.11 ST ELEVATION MYOCARDIAL INFARCTION INVOLVING RIGHT CORONARY ARTERY (HCC): ICD-10-CM

## 2021-11-12 DIAGNOSIS — I25.10 CORONARY ARTERY DISEASE INVOLVING NATIVE CORONARY ARTERY OF NATIVE HEART WITHOUT ANGINA PECTORIS: Primary | ICD-10-CM

## 2021-11-12 LAB
ALBUMIN SERPL-MCNC: 4.8 G/DL (ref 3.5–4.6)
ALP BLD-CCNC: 104 U/L (ref 40–130)
ALT SERPL-CCNC: 39 U/L (ref 0–33)
ANION GAP SERPL CALCULATED.3IONS-SCNC: 10 MEQ/L (ref 9–15)
APTT: 26.8 SEC (ref 24.4–36.8)
AST SERPL-CCNC: 26 U/L (ref 0–35)
BASOPHILS ABSOLUTE: 0.1 K/UL (ref 0–0.2)
BASOPHILS RELATIVE PERCENT: 1.1 %
BILIRUB SERPL-MCNC: 0.3 MG/DL (ref 0.2–0.7)
BUN BLDV-MCNC: 14 MG/DL (ref 6–20)
CALCIUM SERPL-MCNC: 9.6 MG/DL (ref 8.5–9.9)
CHLORIDE BLD-SCNC: 103 MEQ/L (ref 95–107)
CO2: 28 MEQ/L (ref 20–31)
CREAT SERPL-MCNC: 0.78 MG/DL (ref 0.5–0.9)
EOSINOPHILS ABSOLUTE: 0.2 K/UL (ref 0–0.7)
EOSINOPHILS RELATIVE PERCENT: 2.7 %
GFR AFRICAN AMERICAN: >60
GFR NON-AFRICAN AMERICAN: >60
GLOBULIN: 3.5 G/DL (ref 2.3–3.5)
GLUCOSE BLD-MCNC: 146 MG/DL (ref 70–99)
HCT VFR BLD CALC: 45.1 % (ref 37–47)
HEMOGLOBIN: 14.9 G/DL (ref 12–16)
INR BLD: 1
LYMPHOCYTES ABSOLUTE: 1.3 K/UL (ref 1–4.8)
LYMPHOCYTES RELATIVE PERCENT: 19.4 %
MCH RBC QN AUTO: 27 PG (ref 27–31.3)
MCHC RBC AUTO-ENTMCNC: 33.1 % (ref 33–37)
MCV RBC AUTO: 81.7 FL (ref 82–100)
MONOCYTES ABSOLUTE: 0.4 K/UL (ref 0.2–0.8)
MONOCYTES RELATIVE PERCENT: 5.7 %
NEUTROPHILS ABSOLUTE: 4.9 K/UL (ref 1.4–6.5)
NEUTROPHILS RELATIVE PERCENT: 71.1 %
PDW BLD-RTO: 14.9 % (ref 11.5–14.5)
PLATELET # BLD: 317 K/UL (ref 130–400)
POTASSIUM SERPL-SCNC: 4.6 MEQ/L (ref 3.4–4.9)
PROTHROMBIN TIME: 12.8 SEC (ref 12.3–14.9)
RBC # BLD: 5.52 M/UL (ref 4.2–5.4)
SODIUM BLD-SCNC: 141 MEQ/L (ref 135–144)
TOTAL CK: 133 U/L (ref 0–170)
TOTAL PROTEIN: 8.3 G/DL (ref 6.3–8)
WBC # BLD: 6.8 K/UL (ref 4.8–10.8)

## 2021-11-12 PROCEDURE — 82550 ASSAY OF CK (CPK): CPT

## 2021-11-12 PROCEDURE — 36415 COLL VENOUS BLD VENIPUNCTURE: CPT

## 2021-11-12 PROCEDURE — G8484 FLU IMMUNIZE NO ADMIN: HCPCS | Performed by: INTERNAL MEDICINE

## 2021-11-12 PROCEDURE — 80053 COMPREHEN METABOLIC PANEL: CPT

## 2021-11-12 PROCEDURE — 6370000000 HC RX 637 (ALT 250 FOR IP): Performed by: STUDENT IN AN ORGANIZED HEALTH CARE EDUCATION/TRAINING PROGRAM

## 2021-11-12 PROCEDURE — 2060000000 HC ICU INTERMEDIATE R&B

## 2021-11-12 PROCEDURE — 99284 EMERGENCY DEPT VISIT MOD MDM: CPT

## 2021-11-12 PROCEDURE — 85025 COMPLETE CBC W/AUTO DIFF WBC: CPT

## 2021-11-12 PROCEDURE — G8419 CALC BMI OUT NRM PARAM NOF/U: HCPCS | Performed by: INTERNAL MEDICINE

## 2021-11-12 PROCEDURE — 85730 THROMBOPLASTIN TIME PARTIAL: CPT

## 2021-11-12 PROCEDURE — 1036F TOBACCO NON-USER: CPT | Performed by: INTERNAL MEDICINE

## 2021-11-12 PROCEDURE — 93880 EXTRACRANIAL BILAT STUDY: CPT

## 2021-11-12 PROCEDURE — 85610 PROTHROMBIN TIME: CPT

## 2021-11-12 PROCEDURE — 6370000000 HC RX 637 (ALT 250 FOR IP): Performed by: INTERNAL MEDICINE

## 2021-11-12 PROCEDURE — G8427 DOCREV CUR MEDS BY ELIG CLIN: HCPCS | Performed by: INTERNAL MEDICINE

## 2021-11-12 PROCEDURE — 99215 OFFICE O/P EST HI 40 MIN: CPT | Performed by: INTERNAL MEDICINE

## 2021-11-12 PROCEDURE — 3017F COLORECTAL CA SCREEN DOC REV: CPT | Performed by: INTERNAL MEDICINE

## 2021-11-12 PROCEDURE — 93880 EXTRACRANIAL BILAT STUDY: CPT | Performed by: INTERNAL MEDICINE

## 2021-11-12 RX ORDER — ONDANSETRON 2 MG/ML
4 INJECTION INTRAMUSCULAR; INTRAVENOUS EVERY 6 HOURS PRN
Status: DISCONTINUED | OUTPATIENT
Start: 2021-11-12 | End: 2021-11-17 | Stop reason: HOSPADM

## 2021-11-12 RX ORDER — NITROGLYCERIN 0.4 MG/1
0.4 TABLET SUBLINGUAL EVERY 5 MIN PRN
Status: DISCONTINUED | OUTPATIENT
Start: 2021-11-12 | End: 2021-11-12 | Stop reason: SDUPTHER

## 2021-11-12 RX ORDER — SODIUM CHLORIDE 9 MG/ML
25 INJECTION, SOLUTION INTRAVENOUS PRN
Status: DISCONTINUED | OUTPATIENT
Start: 2021-11-12 | End: 2021-11-17 | Stop reason: HOSPADM

## 2021-11-12 RX ORDER — ASPIRIN 81 MG/1
81 TABLET, CHEWABLE ORAL DAILY
Status: DISCONTINUED | OUTPATIENT
Start: 2021-11-13 | End: 2021-11-12 | Stop reason: SDUPTHER

## 2021-11-12 RX ORDER — CLOPIDOGREL BISULFATE 75 MG/1
75 TABLET ORAL DAILY
Status: DISCONTINUED | OUTPATIENT
Start: 2021-11-13 | End: 2021-11-17 | Stop reason: HOSPADM

## 2021-11-12 RX ORDER — ASCORBIC ACID 500 MG
500 TABLET ORAL DAILY
Status: DISCONTINUED | OUTPATIENT
Start: 2021-11-13 | End: 2021-11-17 | Stop reason: HOSPADM

## 2021-11-12 RX ORDER — ONDANSETRON 4 MG/1
4 TABLET, ORALLY DISINTEGRATING ORAL EVERY 8 HOURS PRN
Status: DISCONTINUED | OUTPATIENT
Start: 2021-11-12 | End: 2021-11-17 | Stop reason: HOSPADM

## 2021-11-12 RX ORDER — POLYETHYLENE GLYCOL 3350 17 G/17G
17 POWDER, FOR SOLUTION ORAL DAILY PRN
Status: DISCONTINUED | OUTPATIENT
Start: 2021-11-12 | End: 2021-11-17 | Stop reason: HOSPADM

## 2021-11-12 RX ORDER — ACETAMINOPHEN 650 MG/1
650 SUPPOSITORY RECTAL EVERY 6 HOURS PRN
Status: DISCONTINUED | OUTPATIENT
Start: 2021-11-12 | End: 2021-11-17 | Stop reason: HOSPADM

## 2021-11-12 RX ORDER — VITAMIN B COMPLEX
1 CAPSULE ORAL DAILY
Status: DISCONTINUED | OUTPATIENT
Start: 2021-11-13 | End: 2021-11-17 | Stop reason: HOSPADM

## 2021-11-12 RX ORDER — ACETAMINOPHEN 325 MG/1
650 TABLET ORAL ONCE
Status: COMPLETED | OUTPATIENT
Start: 2021-11-12 | End: 2021-11-12

## 2021-11-12 RX ORDER — ASPIRIN 81 MG/1
81 TABLET, CHEWABLE ORAL DAILY
Status: DISCONTINUED | OUTPATIENT
Start: 2021-11-13 | End: 2021-11-17 | Stop reason: HOSPADM

## 2021-11-12 RX ORDER — HEPARIN SODIUM 10000 [USP'U]/100ML
5-30 INJECTION, SOLUTION INTRAVENOUS CONTINUOUS
Status: DISCONTINUED | OUTPATIENT
Start: 2021-11-12 | End: 2021-11-15

## 2021-11-12 RX ORDER — NITROGLYCERIN 0.4 MG/1
0.4 TABLET SUBLINGUAL EVERY 5 MIN PRN
Status: DISCONTINUED | OUTPATIENT
Start: 2021-11-12 | End: 2021-11-17 | Stop reason: HOSPADM

## 2021-11-12 RX ORDER — ATORVASTATIN CALCIUM 40 MG/1
40 TABLET, FILM COATED ORAL NIGHTLY
Status: DISCONTINUED | OUTPATIENT
Start: 2021-11-12 | End: 2021-11-17 | Stop reason: HOSPADM

## 2021-11-12 RX ORDER — ACETAMINOPHEN 325 MG/1
650 TABLET ORAL EVERY 6 HOURS PRN
Status: DISCONTINUED | OUTPATIENT
Start: 2021-11-12 | End: 2021-11-17 | Stop reason: HOSPADM

## 2021-11-12 RX ORDER — HEPARIN SODIUM 1000 [USP'U]/ML
40 INJECTION, SOLUTION INTRAVENOUS; SUBCUTANEOUS PRN
Status: DISCONTINUED | OUTPATIENT
Start: 2021-11-12 | End: 2021-11-17 | Stop reason: HOSPADM

## 2021-11-12 RX ORDER — HEPARIN SODIUM 5000 [USP'U]/ML
5000 INJECTION, SOLUTION INTRAVENOUS; SUBCUTANEOUS ONCE
Status: DISCONTINUED | OUTPATIENT
Start: 2021-11-12 | End: 2021-11-12 | Stop reason: ALTCHOICE

## 2021-11-12 RX ORDER — SODIUM CHLORIDE 0.9 % (FLUSH) 0.9 %
5-40 SYRINGE (ML) INJECTION EVERY 12 HOURS SCHEDULED
Status: DISCONTINUED | OUTPATIENT
Start: 2021-11-12 | End: 2021-11-17 | Stop reason: HOSPADM

## 2021-11-12 RX ORDER — HEPARIN SODIUM 1000 [USP'U]/ML
80 INJECTION, SOLUTION INTRAVENOUS; SUBCUTANEOUS ONCE
Status: DISCONTINUED | OUTPATIENT
Start: 2021-11-12 | End: 2021-11-17 | Stop reason: HOSPADM

## 2021-11-12 RX ORDER — HEPARIN SODIUM 1000 [USP'U]/ML
80 INJECTION, SOLUTION INTRAVENOUS; SUBCUTANEOUS PRN
Status: DISCONTINUED | OUTPATIENT
Start: 2021-11-12 | End: 2021-11-17 | Stop reason: HOSPADM

## 2021-11-12 RX ORDER — SODIUM CHLORIDE 0.9 % (FLUSH) 0.9 %
5-40 SYRINGE (ML) INJECTION PRN
Status: DISCONTINUED | OUTPATIENT
Start: 2021-11-12 | End: 2021-11-17 | Stop reason: HOSPADM

## 2021-11-12 RX ORDER — CARVEDILOL 3.12 MG/1
3.12 TABLET ORAL 2 TIMES DAILY
Status: DISCONTINUED | OUTPATIENT
Start: 2021-11-12 | End: 2021-11-17 | Stop reason: HOSPADM

## 2021-11-12 RX ORDER — ATORVASTATIN CALCIUM 80 MG/1
80 TABLET, FILM COATED ORAL NIGHTLY
Status: DISCONTINUED | OUTPATIENT
Start: 2021-11-12 | End: 2021-11-17 | Stop reason: HOSPADM

## 2021-11-12 RX ADMIN — CARVEDILOL 3.12 MG: 3.12 TABLET, FILM COATED ORAL at 21:57

## 2021-11-12 RX ADMIN — ATORVASTATIN CALCIUM 40 MG: 40 TABLET, FILM COATED ORAL at 21:57

## 2021-11-12 RX ADMIN — ACETAMINOPHEN 650 MG: 325 TABLET ORAL at 21:57

## 2021-11-12 RX ADMIN — ACETAMINOPHEN 650 MG: 325 TABLET ORAL at 17:58

## 2021-11-12 ASSESSMENT — PAIN SCALES - GENERAL
PAINLEVEL_OUTOF10: 3
PAINLEVEL_OUTOF10: 3

## 2021-11-12 ASSESSMENT — PAIN DESCRIPTION - PAIN TYPE: TYPE: ACUTE PAIN

## 2021-11-12 ASSESSMENT — ENCOUNTER SYMPTOMS
BLOOD IN STOOL: 0
APNEA: 0
EYES NEGATIVE: 1
ALLERGIC/IMMUNOLOGIC NEGATIVE: 1
TROUBLE SWALLOWING: 0
STRIDOR: 0
RESPIRATORY NEGATIVE: 1
SHORTNESS OF BREATH: 0
NAUSEA: 0
COUGH: 0
GASTROINTESTINAL NEGATIVE: 1
SHORTNESS OF BREATH: 0
CHEST TIGHTNESS: 0
ABDOMINAL PAIN: 0
EYE PAIN: 0
COLOR CHANGE: 0
WHEEZING: 0

## 2021-11-12 ASSESSMENT — PAIN DESCRIPTION - ORIENTATION: ORIENTATION: UPPER;RIGHT;LEFT

## 2021-11-12 ASSESSMENT — PAIN DESCRIPTION - LOCATION: LOCATION: CHEST

## 2021-11-12 NOTE — ED PROVIDER NOTES
[address] [facility] ED  EMERGENCY DEPARTMENT ENCOUNTER      Pt Name: Ana Rosa Chou  MRN: [id number]  Chazgfrobbin 1970  Date of evaluation: 11/12/2021  Provider: Reggie Hauser DO    CHIEF COMPLAINT       Chief Complaint   Patient presents with    Other     carotid artery blockage, sent from Dr Radha Mena   (Location/Symptom, Timing/Onset, Context/Setting, Quality, Duration, Modifying Factors, Severity)  Note limiting factors. Ana Rosa Chou is a 46 y.o. female who presents to the emergency department after having carotid artery ultrasounds today and being advised by her cardiologist to come to the emergency department due to blocked carotid arteries. Patient does state that she has been having some sporadic headaches but denies any dizziness. Patient also complains of a feeling as if there is some \"fluid\" in her right ear which has been present for the past several days. Patient had an upper respiratory illness 2 weeks ago that was treated with a Z-Chalino and subsequently resolved. Patient states that she was advised that her left carotid artery is completely blocked and the right is partially occluded. Patient denies any numbness or tingling    HPI    Nursing Notes were reviewed. REVIEW OF SYSTEMS    (2-9 systems for level 4, 10 or more for level 5)     Review of Systems   Constitutional: Negative for diaphoresis and fever. HENT: Negative for hearing loss and trouble swallowing. Eyes: Negative for pain. Respiratory: Negative for apnea and shortness of breath. Cardiovascular: Negative for chest pain. Gastrointestinal: Negative for abdominal pain. Endocrine: Negative. Genitourinary: Negative for hematuria. Musculoskeletal: Negative for neck pain and neck stiffness. Skin: Negative for color change. Allergic/Immunologic: Negative. Neurological: Positive for headaches. Negative for dizziness and numbness. Hematological: Negative. Occupational History    None   Tobacco Use    Smoking status: Former Smoker     Packs/day: 0.25     Quit date: 10/25/2021     Years since quittin.0    Smokeless tobacco: Never Used   Substance and Sexual Activity    Alcohol use: Yes     Comment: socially    Drug use: No    Sexual activity: Not Currently   Other Topics Concern    None   Social History Narrative    None     Social Determinants of Health     Financial Resource Strain: Low Risk     Difficulty of Paying Living Expenses: Not very hard   Food Insecurity: No Food Insecurity    Worried About Running Out of Food in the Last Year: Never true    Geni of Food in the Last Year: Never true   Transportation Needs: No Transportation Needs    Lack of Transportation (Medical): No    Lack of Transportation (Non-Medical):  No   Physical Activity:     Days of Exercise per Week: Not on file    Minutes of Exercise per Session: Not on file   Stress:     Feeling of Stress : Not on file   Social Connections:     Frequency of Communication with Friends and Family: Not on file    Frequency of Social Gatherings with Friends and Family: Not on file    Attends Congregation Services: Not on file    Active Member of [address] [place] [address] or Organizations: Not on file    Attends Club or Organization Meetings: Not on file    Marital Status: Not on file   Intimate Partner Violence:     Fear of Current or Ex-Partner: Not on file    Emotionally Abused: Not on file    Physically Abused: Not on file    Sexually Abused: Not on file   Housing Stability:     Unable to Pay for Housing in the Last Year: Not on file    Number of [place] in the Last Year: Not on file    Unstable Housing in the Last Year: Not on file       SCREENINGS                        PHYSICAL EXAM    (up to 7 for level 4, 8 or more for level 5)     ED Triage Vitals [21 1353]   BP Temp Temp Source Pulse Resp SpO2 Height Weight   (!) 209/84 98.4 °F (36.9 °C) Oral 77 18 100 % 5' 4\" (1.626 m) 155 lb (70.3 kg)       Physical Exam  Vitals and nursing note reviewed. Constitutional:       General: She is not in acute distress. Appearance: She is well-developed. She is not diaphoretic. HENT:      Head: Normocephalic and atraumatic. Mouth/Throat:      Pharynx: No oropharyngeal exudate. Eyes:      General: No scleral icterus. Conjunctiva/sclera: Conjunctivae normal.      Pupils: Pupils are equal, round, and reactive to light. Neck:      Vascular: Carotid bruit present. No hepatojugular reflux. Trachea: No tracheal deviation. Cardiovascular:      Rate and Rhythm: Normal rate. Heart sounds: Normal heart sounds. Pulmonary:      Effort: Pulmonary effort is normal. No respiratory distress. Breath sounds: Normal breath sounds. Abdominal:      General: Bowel sounds are normal. There is no distension. Palpations: Abdomen is soft. Musculoskeletal:         General: Normal range of motion. Cervical back: Normal range of motion and neck supple. Skin:     General: Skin is warm and dry. Findings: No erythema or rash. Neurological:      Mental Status: She is alert and oriented to person, place, and time. Cranial Nerves: No cranial nerve deficit. Motor: No abnormal muscle tone. Psychiatric:         Behavior: Behavior normal.         Thought Content:  Thought content normal.         Judgment: Judgment normal.         DIAGNOSTIC RESULTS     EKG: All EKG's are interpreted by the Emergency Department Physician who either signs or Co-signs this chart in the absence of a cardiologist.        RADIOLOGY:   Non-plain film images such as CT, Ultrasound and MRI are read by the radiologist. Plain radiographic images are visualized and preliminarily interpreted by the emergency physician with the below findings:        Interpretation per the Radiologist below, if available at the time of this note:    No orders to display         ED BEDSIDE ULTRASOUND:   Performed by ED Physician - none    LABS:  Labs Reviewed   CBC WITH AUTO DIFFERENTIAL   COMPREHENSIVE METABOLIC PANEL   PROTIME-INR   APTT   CK       All other labs were within normal range or not returned as of this dictation. EMERGENCY DEPARTMENT COURSE and DIFFERENTIAL DIAGNOSIS/MDM:   Vitals:    Vitals:    11/12/21 1353   BP: (!) 209/84   Pulse: 77   Resp: 18   Temp: 98.4 °F (36.9 °C)   TempSrc: Oral   SpO2: 100%   Weight: 155 lb (70.3 kg)   Height: 5' 4\" (1.626 m)           MDM  Spoke with Dr. Trevor Pennington. He did an ultrasound and the patient has 99% blockage on the left and 100% blockage on the right. He wants the patient heparinized. A 5000 unit bolus of 18 units/kg/h. Patient has no neurological findings. No facial droop. No slurred speech. No visual changes. No ataxia. Patient had had a history of a STEMI. She states that she had wanted to follow-up with [facility] but her insurance to change. And then she ended up having to establish with [facility] cardiology. They found the blockage in her neck today. At time of exam no chest pain or shortness of breath. Patient denies any fever, chills or cough. Plan is for Dr. Walker Said to do a carotid stent after the patient's been anticoagulated. Patient's blood pressure is high but she states that she is upset. She took her own dose of Coreg in the emergency room. REASSESSMENT        :Dr. Rosaura Morales spoke with Dr. Trevor Pennington, Dr. Rosaura Morales to place transitional orders. CONSULTS:  IP CONSULT TO PHARMACY    PROCEDURES:  Unless otherwise noted below, none     Procedures        FINAL IMPRESSION      1. Carotid occlusion, bilateral          DISPOSITION/PLAN   DISPOSITION Decision To Admit 11/12/2021 03:09:07 PM      PATIENT REFERRED TO:  No follow-up provider specified. DISCHARGE MEDICATIONS:  New Prescriptions    No medications on file     Controlled Substances Monitoring:     No flowsheet data found.     (Please note that portions of this note were completed with a voice recognition program.  Efforts were made to edit the dictations but occasionally words are mis-transcribed.)    Koreen Skiff, DO (electronically signed)  Attending Emergency Physician            Koreen Skiff, DO  11/12/21 [id number]

## 2021-11-12 NOTE — CARE COORDINATION
[facility] Case Management Initial Discharge Assessment    Met with Patient to discuss discharge plan. PCP: Dejah Ricardo PA-C                                Date of Last Visit: cardio today. pcp 10/26/21    If no PCP, list provided? N/A    Discharge Planning    Living Arrangements: independently at home    Who do you live with? sister    Who helps you with your care:  self    If lives at home:     Do you have any barriers navigating in your home? no    Patient can perform ADL? Yes    Current Services (outpatient and in home) :  None    Dialysis: No    Is transportation available to get to your appointments? Yes    DME Equipment:  no    Respiratory equipment: None    Respiratory provider:  no     [facility]    Consult with Medication Assistance Program?  No      [facility]    Patient agreeable to SNF/Rehab? Declined    Other discharge needs identified? N/A    Does Patient Have a High-Risk for Readmission Diagnosis (CHF, PN, MI, COPD)? No     Initial Discharge Plan? (Note: please see concurrent daily documentation for any updates after initial note). Pt states that she may be interested in going to cardiac rehab after d/c. She denied any other d/c needs. Cm to assess for further d/c needs and referrals.     Readmission Risk              Risk of Unplanned Readmission:  0         Electronically signed by Lashanda Haddad on 11/12/2021 at 3:50 PM

## 2021-11-12 NOTE — TELEPHONE ENCOUNTER
Patient was here in the office to be seen following carotid ultrasound. Per Dr Jairo Hunter patient needed to be admitted urgently to hospital for critical carotid stenosis. Called hospital access line and no beds available. Spoke with Dr Jairo Hunter who advised going to the ER STAT. Spoke with patient and she was visibly upset and crying and stated she wanted to go home first to use the bathroom and eat lunch. Advised she could use our restroom and patient refused. I also advised I could take her to the ER via wheelchair and she refused stating she was going to drive there to move her car. I explained the importance of going to to the ER right away. She was speaking with someone on the phone on speaker and the lady stated to tell us that she can do whatever she wants that we can not tell her what to do. At that point the patient left and walked down the stairs stating she feels fine and she rode her bike yesterday and she was fine then too.

## 2021-11-12 NOTE — ED TRIAGE NOTES
Pt sent to ed by Dr Ana Gamez, per pt she was directed to ed after US of carotid shows occlusion. Pt denies nausea, dizziness or vomiting. Pt reports mild chest discomfort. Pt reports history of STEMI with stent placement. Skin Pale, cap refill less than 3. Skin WDI.  No s/s of acute distress

## 2021-11-12 NOTE — PROGRESS NOTES
OFFICE VISIT        Patient: Gricelda Rayo  YOB: 1970  MRN: [id number]    Chief Complaint: STEMI HTN HPL   Chief Complaint   Patient presents with    Results     abnormal carotid us       CV Data:  10/21 LE Art - negative    Echo EF 55-60 Trace MR  21 STEMI RCA Px ABELARDO. CX distal 70-75%    CUS % LICA >69     Subjective/HPI Dr. Amor Class pt changing due to insurance. Pt has had rare pressure when working as a . These symptoms are different than her MI symptoms. Compliant with meds. Pt was taken off statin for unknown reason. 21 asymptomatic but has severe carotid dz. LICA >95% and LIU occluded. occ chest heaviness. No sob. Work -   Former smoker- quit 2021. occ etoh  ++FH  Lives with sister.         EKG:    Past Medical History:   Diagnosis Date    Abnormal Pap smear of cervix     CAD (coronary artery disease)     Diverticular disease     Diverticulitis        Past Surgical History:   Procedure Laterality Date    DILATION AND CURETTAGE         Family History   Problem Relation Age of Onset    No Known Problems Father     No Known Problems Mother     No Known Problems Paternal Grandfather     No Known Problems Paternal Grandmother     No Known Problems Maternal Grandmother     No Known Problems Maternal Grandfather     No Known Problems Brother     No Known Problems Sister     No Known Problems Other     Breast Cancer Neg Hx     Cancer Neg Hx     Colon Cancer Neg Hx     Diabetes Neg Hx     Eclampsia Neg Hx     Hypertension Neg Hx     Ovarian Cancer Neg Hx      Labor Neg Hx     Spont Abortions Neg Hx     Stroke Neg Hx        Social History     Socioeconomic History    Marital status: Single     Spouse name: None    Number of children: None    Years of education: None    Highest education level: None   Occupational History    None   Tobacco Use    Smoking status: Former Smoker     Packs/day: 0.25     Quit date: 10/25/2021     Years since quittin.0    Smokeless tobacco: Never Used   Substance and Sexual Activity    Alcohol use: Yes     Comment: socially    Drug use: No    Sexual activity: Not Currently   Other Topics Concern    None   Social History Narrative    None     Social Determinants of Health     Financial Resource Strain: Low Risk     Difficulty of Paying Living Expenses: Not very hard   Food Insecurity: No Food Insecurity    Worried About Running Out of Food in the Last Year: Never true    Geni of Food in the Last Year: Never true   Transportation Needs: No Transportation Needs    Lack of Transportation (Medical): No    Lack of Transportation (Non-Medical):  No   Physical Activity:     Days of Exercise per Week: Not on file    Minutes of Exercise per Session: Not on file   Stress:     Feeling of Stress : Not on file   Social Connections:     Frequency of Communication with Friends and Family: Not on file    Frequency of Social Gatherings with Friends and Family: Not on file    Attends Druze Services: Not on file    Active Member of [address] [place] [address] or Organizations: Not on file    Attends Club or Organization Meetings: Not on file    Marital Status: Not on file   Intimate Partner Violence:     Fear of Current or Ex-Partner: Not on file    Emotionally Abused: Not on file    Physically Abused: Not on file    Sexually Abused: Not on file   Housing Stability:     Unable to Pay for Housing in the Last Year: Not on file    Number of [place] in the Last Year: Not on file    Unstable Housing in the Last Year: Not on file       Allergies   Allergen Reactions    Sulfa Antibiotics Rash       Current Outpatient Medications   Medication Sig Dispense Refill    ascorbic acid (VITAMIN C) 500 MG tablet Take 500 mg by mouth daily      b complex vitamins capsule Take 1 capsule by mouth daily      atorvastatin (LIPITOR) 80 MG tablet Take 0.5 tablets by mouth nightly 30 tablet 3    carvedilol (COREG) 3.125 MG tablet TAKE 1 TABLET TWICE A DAY      aspirin 81 MG chewable tablet Take 1 tablet by mouth daily 30 tablet 3    nitroGLYCERIN (NITROSTAT) 0.4 MG SL tablet up to max of 3 total doses. If no relief after 1 dose, call 911. 25 tablet 3    clopidogrel (PLAVIX) 75 MG tablet Take 1 tablet by mouth daily 30 tablet 3     No current facility-administered medications for this visit. Review of Systems:   Review of Systems   Constitutional: Negative. Negative for diaphoresis and fatigue. HENT: Negative. Eyes: Negative. Respiratory: Negative. Negative for cough, chest tightness, shortness of breath, wheezing and stridor. Cardiovascular: Negative. Negative for chest pain, palpitations and leg swelling. Gastrointestinal: Negative. Negative for blood in stool and nausea. Genitourinary: Negative. Musculoskeletal: Negative. Skin: Negative. Neurological: Negative. Negative for dizziness, syncope, weakness and light-headedness. Hematological: Negative. Psychiatric/Behavioral: Negative. Physical Examination:    /76 (Site: Right Upper Arm, Position: Sitting, Cuff Size: Medium Adult)   Pulse 72   Resp 16   Ht 5' 4\" (1.626 m)   Wt 165 lb (74.8 kg)   SpO2 98%   BMI 28.32 kg/m²    Physical Exam   Constitutional: She appears healthy. No distress. HENT:   Normal cephalic and Atraumatic   Eyes: Pupils are equal, round, and reactive to light. Neck: Thyroid normal. No JVD present. No neck adenopathy. No thyromegaly present. Cardiovascular: Normal rate, regular rhythm, normal heart sounds, intact distal pulses and normal pulses. Pulmonary/Chest: Effort normal and breath sounds normal. She has no wheezes. She has no rales. She exhibits no tenderness. Abdominal: Soft. Bowel sounds are normal. There is no abdominal tenderness. Musculoskeletal:         General: No tenderness or edema. Normal range of motion.       Cervical back: Normal range of motion and neck supple. Neurological: She is alert and oriented to person, place, and time. Skin: Skin is warm. No cyanosis. Nails show no clubbing. LABS:  CBC:   Lab Results   Component Value Date    WBC 9.9 09/02/2021    RBC 4.46 09/02/2021    HGB 12.7 09/02/2021    HCT 37.6 09/02/2021    MCV 84.2 09/02/2021    MCH 28.4 09/02/2021    MCHC 33.8 09/02/2021    RDW 14.0 09/02/2021     09/02/2021    MPV 10.1 04/08/2015     Lipids:No results found for: CHOL  No results found for: TRIG  No results found for: HDL  No results found for: LDLCHOLESTEROL, LDLCALC  No results found for: LABVLDL, VLDL  No results found for: CHOLHDLRATIO  CMP:    Lab Results   Component Value Date     09/02/2021    K 4.4 09/02/2021     09/02/2021    CO2 28 09/02/2021    BUN 12 09/02/2021    CREATININE 0.73 09/02/2021    GFRAA >60.0 09/02/2021    LABGLOM >60.0 09/02/2021    GLUCOSE 97 09/02/2021    PROT 7.3 09/02/2021    LABALBU 3.9 09/02/2021    CALCIUM 9.3 09/02/2021    BILITOT 0.4 09/02/2021    ALKPHOS 115 09/02/2021    AST 12 09/02/2021    ALT 27 09/02/2021     BMP:    Lab Results   Component Value Date     09/02/2021    K 4.4 09/02/2021     09/02/2021    CO2 28 09/02/2021    BUN 12 09/02/2021    LABALBU 3.9 09/02/2021    CREATININE 0.73 09/02/2021    CALCIUM 9.3 09/02/2021    GFRAA >60.0 09/02/2021    LABGLOM >60.0 09/02/2021    GLUCOSE 97 09/02/2021     Magnesium:  No results found for: MG  TSH:  Lab Results   Component Value Date    TSH 3.230 02/05/2019             Patient Active Problem List   Diagnosis    Diverticulosis    Irregular periods    Uterine leiomyoma    Rash and nonspecific skin eruption    Acute gastritis    Generalized abdominal pain    DUB (dysfunctional uterine bleeding)    Tobacco abuse    Acute myocardial infarction ([facility] [address]       There are no discontinued medications.     Modified Medications    No medications on file       No orders of the defined types were placed in this encounter. Assessment/Plan:    1. Coronary artery disease involving native coronary artery of native heart without angina pectoris  Stable no angina. continue meds    2. Essential hypertension  Stable. Continue meds. Low salt diet  - CBC; Future  - Comprehensive Metabolic Panel; Future  - TSH without Reflex; Future  - Magnesium; Future    3. Dyslipidemia  Resume Atorvastatin at lower dose of 40 qd. rechck labs. Low fat diet. - Lipid, Fasting; Future    4. ST elevation myocardial infarction involving right coronary artery ([address]  5. Left Carotid Bruit- CUS - severe dz. Discussed with Dr. Rigoberto Calero- will admit pt to further assess Carotids. Counseling:  Heart Healthy Lifestyle, Improve BMI, Low Salt Diet, Take Precautions to Prevent Falls and Walk Daily    No follow-ups on file.     Electronically signed by Jo Ann Elizalde MD on 11/12/2021 at 12:33 PM

## 2021-11-13 ENCOUNTER — APPOINTMENT (OUTPATIENT)
Dept: CT IMAGING | Age: 51
DRG: 036 | End: 2021-11-13
Payer: COMMERCIAL

## 2021-11-13 LAB
ANTI-XA UNFRAC HEPARIN: 0.44 IU/ML
ANTI-XA UNFRAC HEPARIN: 0.55 IU/ML
ANTI-XA UNFRAC HEPARIN: 0.81 IU/ML
MAGNESIUM: 2.2 MG/DL (ref 1.7–2.4)

## 2021-11-13 PROCEDURE — 93005 ELECTROCARDIOGRAM TRACING: CPT | Performed by: INTERNAL MEDICINE

## 2021-11-13 PROCEDURE — 6370000000 HC RX 637 (ALT 250 FOR IP): Performed by: INTERNAL MEDICINE

## 2021-11-13 PROCEDURE — 2060000000 HC ICU INTERMEDIATE R&B

## 2021-11-13 PROCEDURE — 2580000003 HC RX 258: Performed by: INTERNAL MEDICINE

## 2021-11-13 PROCEDURE — 6360000004 HC RX CONTRAST MEDICATION: Performed by: NURSE PRACTITIONER

## 2021-11-13 PROCEDURE — 99223 1ST HOSP IP/OBS HIGH 75: CPT | Performed by: INTERNAL MEDICINE

## 2021-11-13 PROCEDURE — APPSS30 APP SPLIT SHARED TIME 16-30 MINUTES: Performed by: NURSE PRACTITIONER

## 2021-11-13 PROCEDURE — 85520 HEPARIN ASSAY: CPT

## 2021-11-13 PROCEDURE — 36415 COLL VENOUS BLD VENIPUNCTURE: CPT

## 2021-11-13 PROCEDURE — 83735 ASSAY OF MAGNESIUM: CPT

## 2021-11-13 PROCEDURE — 70498 CT ANGIOGRAPHY NECK: CPT

## 2021-11-13 RX ADMIN — Medication 10 ML: at 21:30

## 2021-11-13 RX ADMIN — ACETAMINOPHEN 650 MG: 325 TABLET ORAL at 08:57

## 2021-11-13 RX ADMIN — ATORVASTATIN CALCIUM 40 MG: 40 TABLET, FILM COATED ORAL at 21:17

## 2021-11-13 RX ADMIN — IOPAMIDOL 100 ML: 755 INJECTION, SOLUTION INTRAVENOUS at 14:28

## 2021-11-13 RX ADMIN — CARVEDILOL 3.12 MG: 3.12 TABLET, FILM COATED ORAL at 21:18

## 2021-11-13 RX ADMIN — CLOPIDOGREL BISULFATE 75 MG: 75 TABLET, FILM COATED ORAL at 08:39

## 2021-11-13 RX ADMIN — Medication 1 CAPSULE: at 08:39

## 2021-11-13 RX ADMIN — ASPIRIN 81 MG: 81 TABLET, CHEWABLE ORAL at 08:39

## 2021-11-13 RX ADMIN — OXYCODONE HYDROCHLORIDE AND ACETAMINOPHEN 500 MG: 500 TABLET ORAL at 08:46

## 2021-11-13 RX ADMIN — Medication 10 ML: at 08:46

## 2021-11-13 RX ADMIN — CARVEDILOL 3.12 MG: 3.12 TABLET, FILM COATED ORAL at 08:39

## 2021-11-13 ASSESSMENT — PAIN SCALES - GENERAL
PAINLEVEL_OUTOF10: 3
PAINLEVEL_OUTOF10: 4

## 2021-11-13 ASSESSMENT — ENCOUNTER SYMPTOMS
NAUSEA: 0
SHORTNESS OF BREATH: 0
COUGH: 0
VOMITING: 0
CONSTIPATION: 0
RHINORRHEA: 0
BACK PAIN: 0
WHEEZING: 0
ABDOMINAL DISTENTION: 0
DIARRHEA: 0
TROUBLE SWALLOWING: 0
ABDOMINAL PAIN: 0

## 2021-11-13 ASSESSMENT — PAIN DESCRIPTION - PAIN TYPE: TYPE: ACUTE PAIN;CHRONIC PAIN

## 2021-11-13 ASSESSMENT — PAIN DESCRIPTION - LOCATION: LOCATION: HEAD

## 2021-11-13 NOTE — H&P
Patient: Lisandra Salas  Unit/Bed: S112/D651-67  YOB: 1970  MRN: [id number]  Acct: [de-identified]   Admitting Diagnosis: Bilateral carotid artery occlusion [I65.23]  Carotid occlusion, bilateral [I65.23]  Date:  11/12/2021  Hospital Day: 1     Chief Complaint: abnormal carotid US     Subjective  11/13/2021: pt with medical history of STEMI 8/25/21 s/p PCI of RCA, tobacco abuse, diverticulosis. Pt admitted after abnormal carotid ultrasound results, 680% LIU, and 88% LICA. Pt sitting up in bed, denies chest pain, SOB, palpitations, headache. Pt receiving IV heparin. Vital signs are stable. Pt is hemodynamically stable.       Review of Systems:   Review of Systems   Constitutional: Negative for chills, diaphoresis and fever. HENT: Negative for congestion, rhinorrhea and trouble swallowing. Eyes: Negative for visual disturbance. Respiratory: Negative for cough, shortness of breath and wheezing. Cardiovascular: Negative for chest pain, palpitations and leg swelling. Gastrointestinal: Negative for abdominal distention, abdominal pain, constipation, diarrhea, nausea and vomiting. Endocrine: Negative. Genitourinary: Negative for difficulty urinating, dysuria, frequency and urgency. Musculoskeletal: Negative for back pain and gait problem. Skin: Negative for wound. Neurological: Negative for dizziness, seizures, syncope, speech difficulty, weakness, numbness and headaches. Hematological: Does not bruise/bleed easily. Psychiatric/Behavioral: Negative.             Physical Examination:    /65   Pulse 61   Temp 98.1 °F (36.7 °C) (Oral)   Resp 18   Ht 5' 4\" (1.626 m)   Wt 162 lb 14.7 oz (73.9 kg)   SpO2 98%   BMI 27.97 kg/m²    Physical Exam  Vitals and nursing note reviewed. Constitutional:       General: She is not in acute distress. HENT:      Head: Normocephalic.       Nose: Nose normal.      Mouth/Throat:      Mouth: Mucous membranes are moist.   Eyes: Pupils: Pupils are equal, round, and reactive to light. Neck:      Vascular: Carotid bruit present. Cardiovascular:      Rate and Rhythm: Normal rate and regular rhythm. Pulses: Normal pulses. Heart sounds: Normal heart sounds. No murmur heard. No friction rub. No gallop. Pulmonary:      Effort: Pulmonary effort is normal. No respiratory distress. Breath sounds: Normal breath sounds. No stridor. No wheezing, rhonchi or rales. Chest:      Chest wall: No tenderness. Abdominal:      General: Abdomen is flat. Palpations: Abdomen is soft. Musculoskeletal:         General: Normal range of motion. Cervical back: Normal range of motion. Right lower leg: No edema. Left lower leg: No edema. Skin:     General: Skin is warm and dry. Neurological:      General: No focal deficit present. Mental Status: She is alert and oriented to person, place, and time.    Psychiatric:         Mood and Affect: Mood normal.         Behavior: Behavior normal.            LABS:  CBC:   Lab Results   Component Value Date     WBC 6.8 11/12/2021     RBC 5.52 11/12/2021     HGB 14.9 11/12/2021     HCT 45.1 11/12/2021     MCV 81.7 11/12/2021     MCH 27.0 11/12/2021     MCHC 33.1 11/12/2021     RDW 14.9 11/12/2021      11/12/2021     MPV 10.1 04/08/2015      CBC with Differential:         Lab Results   Component Value Date     WBC 6.8 11/12/2021     RBC 5.52 11/12/2021     HGB 14.9 11/12/2021     HCT 45.1 11/12/2021      11/12/2021     MCV 81.7 11/12/2021     MCH 27.0 11/12/2021     MCHC 33.1 11/12/2021     RDW 14.9 11/12/2021     LYMPHOPCT 19.4 11/12/2021     MONOPCT 5.7 11/12/2021     EOSPCT 1.2 04/08/2015     BASOPCT 1.1 11/12/2021     MONOSABS 0.4 11/12/2021     LYMPHSABS 1.3 11/12/2021     EOSABS 0.2 11/12/2021     BASOSABS 0.1 11/12/2021      CMP:          Lab Results   Component Value Date      11/12/2021     K 4.6 11/12/2021      11/12/2021     CO2 28 11/12/2021   BUN 14 11/12/2021     CREATININE 0.78 11/12/2021     GFRAA >60.0 11/12/2021     LABGLOM >60.0 11/12/2021     GLUCOSE 146 11/12/2021     PROT 8.3 11/12/2021     LABALBU 4.8 11/12/2021     CALCIUM 9.6 11/12/2021     BILITOT 0.3 11/12/2021     ALKPHOS 104 11/12/2021     AST 26 11/12/2021     ALT 39 11/12/2021      BMP:          Lab Results   Component Value Date      11/12/2021     K 4.6 11/12/2021      11/12/2021     CO2 28 11/12/2021     BUN 14 11/12/2021     LABALBU 4.8 11/12/2021     CREATININE 0.78 11/12/2021     CALCIUM 9.6 11/12/2021     GFRAA >60.0 11/12/2021     LABGLOM >60.0 11/12/2021     GLUCOSE 146 11/12/2021      Magnesium:  No results found for: MG  Troponin:          Lab Results   Component Value Date     TROPONINI 1.580 08/26/2021         Radiology:  US CAROTID ARTERY BILATERAL     Result Date: 11/12/2021  EXAMINATION:  CAROTID DUPLEX ULTRASONOGRAPHY CLINICAL HISTORY:  CAROTID BRUIT COMPARISONS:  NONE AVAILABLE TECHNIQUE:  B-mode, color flow and spectral Doppler FINDINGS:  ARTERIAL BLOOD FLOW VELOCITY RIGHT PS                                               Prox CCA    87 cm/s             Mid CCA     104 cm/s              Dist CCA    90 cm/s              Prox ICA    126 cm/s               Mid ICA     110 cm/s            Dist ICA cm/s             Prox ECA    152 cm/s             Prox VERT cm/s              ICA/CCA     1.21                        LEFT PS Prox CCA    96 cm/s Mid CCA     93 cm/s Dist CCA    103 cm/s Prox ICA    781 cm/s Mid ICA     141 cm/s Dist ICA    70 cm/s Prox ECA    360 cm/s Prox VERT cm/s ICA/CCA     8.39      RIGHT ICA DISTALLY IS OCCLUDED. PROXIMAL LEFT ICA SEVERE STENOSIS CRITICAL GREATER THAN 90%. THERE IS DIFFUSE ATHEROSCLEROSIS THROUGHOUT BOTH CAROTID TREES.  BILATERAL ANTEGRADE VERTEBRAL FLOW.            Assessment:          Active Hospital Problems     Diagnosis Date Noted    Bilateral carotid artery occlusion [I65.23] 11/12/2021       Priority: Low      CAD s/p STEMI with PCI to RCA 8/21  Htn  Dyslipidemia  bilat carotid stenosis     Plan:  1. CTA neck today  2. As always, aggressive risk factor modification is strongly recommended. We should adhere to the JNC VIII guidelines for HTN management and the NCEPATP III guidelines for LDL-C management. 3. Monitor on telemetry  4. Maximize cardiac medications  5. GI/DVT prophylaxis  6. Maintain potassium greater than 4, magnesium greater than 2  7. Further recommendations to follow     Electronically signed by ADRIANA Hill CNP on 11/13/2021 at 10:12 AM        Attending Supervising [de-identified] Attestation Statement  The patient is a 46 y.o. female. I have performed a history and physical examination of the patient. I discussed the case with the nurse practitioner. I reviewed the patient's Past Medical History, Past Surgical History, Medications, and Allergies.      Physical Exam:  Vitals:    11/12/21 2058 11/12/21 2101 11/12/21 2103 11/13/21 0728   BP:  (!) 148/80 (!) 148/80 130/65   Pulse:  68 67 61   Resp:  18  18   Temp:  98.9 °F (37.2 °C) 98.8 °F (37.1 °C) 98.1 °F (36.7 °C)   TempSrc:  Oral  Oral   SpO2:  99% 99% 98%   Weight: 162 lb 14.7 oz (73.9 kg)      Height: 5' 4\" (1.626 m)          Review of Systems - Respiratory ROS: no cough, shortness of breath, or wheezing  Cardiovascular ROS: no chest pain or dyspnea on exertion  Gastrointestinal ROS: no abdominal pain, change in bowel habits, or black or bloody stools    Pulmonary/Chest: clear to auscultation bilaterally- no wheezes, rales or rhonchi, normal air movement, no respiratory distress  Cardiovascular: normal rate, normal S1 and S2, no gallops, intact distal pulses and no carotid bruits  Abdomen: soft, non-tender, non-distended, normal bowel sounds, no masses or organomegaly    Active Hospital Problems    Diagnosis Date Noted    Bilateral carotid artery occlusion [I65.23] 11/12/2021     Priority: Low        I reviewed and agree with the findings and plan documented in her note . Impression    Severe b/l carotid stenosis  Cad s/p pci   Hx of stemi  Tobacco abuse  Essential hypertension    Plan     Continue with heparin drip  Continue with dual antiplatelet therapy  Check CT of the neck  We will plan for carotid angiogram/stenting on Monday.   Maximize cardiac medications  Monitor on telemetry  GI/DVT prophylaxis  Smoking cessation follow recommended  Further recommendations to follow         Electronically signed by David Bennett DO on 11/13/21 at 11:14 AM EST

## 2021-11-13 NOTE — ED NOTES
Report called to [facility]. Tele pack applied. Transportation notified. Patient updated.       Nissa Mccarty RN  11/12/21 2017

## 2021-11-13 NOTE — PROGRESS NOTES
Progress Note  Patient: Reba Rios  Unit/Bed: C389/U621-69  YOB: 1970  MRN: [id number]  Acct: [de-identified]   Admitting Diagnosis: Bilateral carotid artery occlusion [I65.23]  Carotid occlusion, bilateral [I65.23]  Date:  11/12/2021  Hospital Day: 1    Chief Complaint: abnormal carotid US    Subjective  11/13/2021: pt with medical history of STEMI 8/25/21 s/p PCI of RCA, tobacco abuse, diverticulosis. Pt admitted after abnormal carotid ultrasound results, 152% LIU, and 02% LICA. Pt sitting up in bed, denies chest pain, SOB, palpitations, headache. Pt receiving IV heparin. Vital signs are stable. Pt is hemodynamically stable. Review of Systems:   Review of Systems   Constitutional: Negative for chills, diaphoresis and fever. HENT: Negative for congestion, rhinorrhea and trouble swallowing. Eyes: Negative for visual disturbance. Respiratory: Negative for cough, shortness of breath and wheezing. Cardiovascular: Negative for chest pain, palpitations and leg swelling. Gastrointestinal: Negative for abdominal distention, abdominal pain, constipation, diarrhea, nausea and vomiting. Endocrine: Negative. Genitourinary: Negative for difficulty urinating, dysuria, frequency and urgency. Musculoskeletal: Negative for back pain and gait problem. Skin: Negative for wound. Neurological: Negative for dizziness, seizures, syncope, speech difficulty, weakness, numbness and headaches. Hematological: Does not bruise/bleed easily. Psychiatric/Behavioral: Negative. Physical Examination:    /65   Pulse 61   Temp 98.1 °F (36.7 °C) (Oral)   Resp 18   Ht 5' 4\" (1.626 m)   Wt 162 lb 14.7 oz (73.9 kg)   SpO2 98%   BMI 27.97 kg/m²    Physical Exam  Vitals and nursing note reviewed. Constitutional:       General: She is not in acute distress. HENT:      Head: Normocephalic.       Nose: Nose normal.      Mouth/Throat:      Mouth: Mucous membranes are moist.   Eyes: Pupils: Pupils are equal, round, and reactive to light. Neck:      Vascular: Carotid bruit present. Cardiovascular:      Rate and Rhythm: Normal rate and regular rhythm. Pulses: Normal pulses. Heart sounds: Normal heart sounds. No murmur heard. No friction rub. No gallop. Pulmonary:      Effort: Pulmonary effort is normal. No respiratory distress. Breath sounds: Normal breath sounds. No stridor. No wheezing, rhonchi or rales. Chest:      Chest wall: No tenderness. Abdominal:      General: Abdomen is flat. Palpations: Abdomen is soft. Musculoskeletal:         General: Normal range of motion. Cervical back: Normal range of motion. Right lower leg: No edema. Left lower leg: No edema. Skin:     General: Skin is warm and dry. Neurological:      General: No focal deficit present. Mental Status: She is alert and oriented to person, place, and time.    Psychiatric:         Mood and Affect: Mood normal.         Behavior: Behavior normal.         LABS:  CBC:   Lab Results   Component Value Date    WBC 6.8 11/12/2021    RBC 5.52 11/12/2021    HGB 14.9 11/12/2021    HCT 45.1 11/12/2021    MCV 81.7 11/12/2021    MCH 27.0 11/12/2021    MCHC 33.1 11/12/2021    RDW 14.9 11/12/2021     11/12/2021    MPV 10.1 04/08/2015     CBC with Differential:   Lab Results   Component Value Date    WBC 6.8 11/12/2021    RBC 5.52 11/12/2021    HGB 14.9 11/12/2021    HCT 45.1 11/12/2021     11/12/2021    MCV 81.7 11/12/2021    MCH 27.0 11/12/2021    MCHC 33.1 11/12/2021    RDW 14.9 11/12/2021    LYMPHOPCT 19.4 11/12/2021    MONOPCT 5.7 11/12/2021    EOSPCT 1.2 04/08/2015    BASOPCT 1.1 11/12/2021    MONOSABS 0.4 11/12/2021    LYMPHSABS 1.3 11/12/2021    EOSABS 0.2 11/12/2021    BASOSABS 0.1 11/12/2021     CMP:    Lab Results   Component Value Date     11/12/2021    K 4.6 11/12/2021     11/12/2021    CO2 28 11/12/2021    BUN 14 11/12/2021    CREATININE 0.78 11/12/2021    GFRAA >60.0 11/12/2021    LABGLOM >60.0 11/12/2021    GLUCOSE 146 11/12/2021    PROT 8.3 11/12/2021    LABALBU 4.8 11/12/2021    CALCIUM 9.6 11/12/2021    BILITOT 0.3 11/12/2021    ALKPHOS 104 11/12/2021    AST 26 11/12/2021    ALT 39 11/12/2021     BMP:    Lab Results   Component Value Date     11/12/2021    K 4.6 11/12/2021     11/12/2021    CO2 28 11/12/2021    BUN 14 11/12/2021    LABALBU 4.8 11/12/2021    CREATININE 0.78 11/12/2021    CALCIUM 9.6 11/12/2021    GFRAA >60.0 11/12/2021    LABGLOM >60.0 11/12/2021    GLUCOSE 146 11/12/2021     Magnesium:  No results found for: MG  Troponin:    Lab Results   Component Value Date    TROPONINI 1.580 08/26/2021       Radiology:  US CAROTID ARTERY BILATERAL    Result Date: 11/12/2021  EXAMINATION:  CAROTID DUPLEX ULTRASONOGRAPHY CLINICAL HISTORY:  CAROTID BRUIT COMPARISONS:  NONE AVAILABLE TECHNIQUE:  B-mode, color flow and spectral Doppler FINDINGS:  ARTERIAL BLOOD FLOW VELOCITY RIGHT PS                                               Prox CCA    87 cm/s             Mid CCA     104 cm/s              Dist CCA    90 cm/s              Prox ICA    126 cm/s               Mid ICA     110 cm/s            Dist ICA cm/s             Prox ECA    152 cm/s             Prox VERT cm/s              ICA/CCA     1.21                        LEFT PS Prox CCA    96 cm/s Mid CCA     93 cm/s Dist CCA    103 cm/s Prox ICA    781 cm/s Mid ICA     141 cm/s Dist ICA    70 cm/s Prox ECA    360 cm/s Prox VERT cm/s ICA/CCA     8.39     RIGHT ICA DISTALLY IS OCCLUDED. PROXIMAL LEFT ICA SEVERE STENOSIS CRITICAL GREATER THAN 90%. THERE IS DIFFUSE ATHEROSCLEROSIS THROUGHOUT BOTH CAROTID TREES. BILATERAL ANTEGRADE VERTEBRAL FLOW. Assessment:    Active Hospital Problems    Diagnosis Date Noted    Bilateral carotid artery occlusion [I65.23] 11/12/2021     Priority: Low     CAD s/p STEMI with PCI to RCA 8/21  Htn  Dyslipidemia  bilat carotid stenosis     Plan:  1.  CTA neck today  2. As always, aggressive risk factor modification is strongly recommended. We should adhere to the JNC VIII guidelines for HTN management and the NCEPATP III guidelines for LDL-C management. 3. Monitor on telemetry  4. Maximize cardiac medications  5. GI/DVT prophylaxis  6. Maintain potassium greater than 4, magnesium greater than 2  7.  Further recommendations to follow    Electronically signed by ADRIANA Spence CNP on 11/13/2021 at 10:12 AM

## 2021-11-14 LAB — ANTI-XA UNFRAC HEPARIN: 0.66 IU/ML

## 2021-11-14 PROCEDURE — 6370000000 HC RX 637 (ALT 250 FOR IP): Performed by: INTERNAL MEDICINE

## 2021-11-14 PROCEDURE — 85520 HEPARIN ASSAY: CPT

## 2021-11-14 PROCEDURE — 2580000003 HC RX 258

## 2021-11-14 PROCEDURE — 99233 SBSQ HOSP IP/OBS HIGH 50: CPT | Performed by: INTERNAL MEDICINE

## 2021-11-14 PROCEDURE — APPSS30 APP SPLIT SHARED TIME 16-30 MINUTES: Performed by: NURSE PRACTITIONER

## 2021-11-14 PROCEDURE — 36415 COLL VENOUS BLD VENIPUNCTURE: CPT

## 2021-11-14 PROCEDURE — 6360000002 HC RX W HCPCS: Performed by: STUDENT IN AN ORGANIZED HEALTH CARE EDUCATION/TRAINING PROGRAM

## 2021-11-14 PROCEDURE — 2500000003 HC RX 250 WO HCPCS

## 2021-11-14 PROCEDURE — 6360000002 HC RX W HCPCS

## 2021-11-14 PROCEDURE — 2060000000 HC ICU INTERMEDIATE R&B

## 2021-11-14 RX ADMIN — ASPIRIN 81 MG: 81 TABLET, CHEWABLE ORAL at 09:16

## 2021-11-14 RX ADMIN — CARVEDILOL 3.12 MG: 3.12 TABLET, FILM COATED ORAL at 09:16

## 2021-11-14 RX ADMIN — ATORVASTATIN CALCIUM 40 MG: 40 TABLET, FILM COATED ORAL at 20:57

## 2021-11-14 RX ADMIN — CLOPIDOGREL BISULFATE 75 MG: 75 TABLET, FILM COATED ORAL at 09:16

## 2021-11-14 RX ADMIN — ACETAMINOPHEN 650 MG: 325 TABLET ORAL at 17:48

## 2021-11-14 RX ADMIN — Medication 1 CAPSULE: at 09:16

## 2021-11-14 RX ADMIN — OXYCODONE HYDROCHLORIDE AND ACETAMINOPHEN 500 MG: 500 TABLET ORAL at 09:16

## 2021-11-14 RX ADMIN — ACETAMINOPHEN 650 MG: 325 TABLET ORAL at 09:22

## 2021-11-14 RX ADMIN — HEPARIN SODIUM AND DEXTROSE 18 UNITS/KG/HR: 10000; 5 INJECTION INTRAVENOUS at 06:00

## 2021-11-14 RX ADMIN — CARVEDILOL 3.12 MG: 3.12 TABLET, FILM COATED ORAL at 20:57

## 2021-11-14 ASSESSMENT — ENCOUNTER SYMPTOMS
CONSTIPATION: 0
ABDOMINAL PAIN: 0
TROUBLE SWALLOWING: 0
SHORTNESS OF BREATH: 0
COUGH: 0
VOMITING: 0
BACK PAIN: 0
ABDOMINAL DISTENTION: 0
NAUSEA: 0
WHEEZING: 0
DIARRHEA: 0
RHINORRHEA: 0

## 2021-11-14 ASSESSMENT — PAIN SCALES - GENERAL
PAINLEVEL_OUTOF10: 2
PAINLEVEL_OUTOF10: 3

## 2021-11-14 NOTE — PROGRESS NOTES
Progress Note  Patient: Adam Hatfield  Unit/Bed: H323/G170-36  YOB: 1970  MRN: [id number]  Acct: [de-identified]   Admitting Diagnosis: Bilateral carotid artery occlusion [I65.23]  Carotid occlusion, bilateral [I65.23]  Date:  11/12/2021  Hospital Day: 2    Chief Complaint: abnormal carotid US     Subjective    11/14/2021: Test on 1 W.  Sitting up in bed, very talkative, no acute distress noted. Patient is monitored on telemetry noted to be in a normal sinus rhythm with heart rate in the 60s. Patient had CTA of the neck yesterday which confirms an occluded R ICA, and a near occluded LICA. Plan for carotid angio tomorrow with Dr. Santhosh Buchanan. 11/13/2021: pt with medical history of STEMI 8/25/21 s/p PCI of RCA, tobacco abuse, diverticulosis.  Pt admitted after abnormal carotid ultrasound results, 228% LIU, and 28% LICA.  Pt sitting up in bed, denies chest pain, SOB, palpitations, headache.  Pt receiving IV heparin.  Vital signs are stable.  Pt is hemodynamically stable. Review of Systems:   Review of Systems   Constitutional: Negative for chills, diaphoresis and fever. HENT: Negative for congestion, rhinorrhea and trouble swallowing. Eyes: Negative for visual disturbance. Respiratory: Negative for cough, shortness of breath and wheezing. Cardiovascular: Negative for chest pain, palpitations and leg swelling. Gastrointestinal: Negative for abdominal distention, abdominal pain, constipation, diarrhea, nausea and vomiting. Endocrine: Negative. Genitourinary: Negative for difficulty urinating, dysuria, frequency and urgency. Musculoskeletal: Negative for back pain and gait problem. Skin: Negative for wound. Neurological: Negative for dizziness, seizures, syncope, speech difficulty, weakness, numbness and headaches. Hematological: Does not bruise/bleed easily. Psychiatric/Behavioral: Negative.           Physical Examination:    BP (!) 143/74   Pulse 63   Temp 98.2 °F (36.8 °C) (Oral)   Resp 16   Ht 5' 4\" (1.626 m)   Wt 162 lb 14.7 oz (73.9 kg)   SpO2 99%   BMI 27.97 kg/m²    Physical Exam  Vitals and nursing note reviewed. Constitutional:       General: She is not in acute distress. HENT:      Head: Normocephalic. Nose: Nose normal.      Mouth/Throat:      Mouth: Mucous membranes are moist.   Eyes:      Pupils: Pupils are equal, round, and reactive to light. Neck:      Vascular: Carotid bruit present. Comments: Left carotid bruit  Cardiovascular:      Rate and Rhythm: Normal rate and regular rhythm. Pulses: Normal pulses. Heart sounds: Normal heart sounds. No murmur heard. No friction rub. No gallop. Pulmonary:      Effort: Pulmonary effort is normal. No respiratory distress. Breath sounds: Normal breath sounds. No stridor. No wheezing, rhonchi or rales. Chest:      Chest wall: No tenderness. Abdominal:      General: Abdomen is flat. Palpations: Abdomen is soft. Musculoskeletal:         General: Normal range of motion. Cervical back: Normal range of motion. Right lower leg: No edema. Left lower leg: No edema. Skin:     General: Skin is warm and dry. Neurological:      General: No focal deficit present. Mental Status: She is alert and oriented to person, place, and time.    Psychiatric:         Mood and Affect: Mood normal.         Behavior: Behavior normal.         LABS:  CBC:   Lab Results   Component Value Date    WBC 6.8 11/12/2021    RBC 5.52 11/12/2021    HGB 14.9 11/12/2021    HCT 45.1 11/12/2021    MCV 81.7 11/12/2021    MCH 27.0 11/12/2021    MCHC 33.1 11/12/2021    RDW 14.9 11/12/2021     11/12/2021    MPV 10.1 04/08/2015     CBC with Differential:   Lab Results   Component Value Date    WBC 6.8 11/12/2021    RBC 5.52 11/12/2021    HGB 14.9 11/12/2021    HCT 45.1 11/12/2021     11/12/2021    MCV 81.7 11/12/2021    MCH 27.0 11/12/2021    MCHC 33.1 11/12/2021    RDW 14.9 11/12/2021 LYMPHOPCT 19.4 11/12/2021    MONOPCT 5.7 11/12/2021    EOSPCT 1.2 04/08/2015    BASOPCT 1.1 11/12/2021    MONOSABS 0.4 11/12/2021    LYMPHSABS 1.3 11/12/2021    EOSABS 0.2 11/12/2021    BASOSABS 0.1 11/12/2021     CMP:    Lab Results   Component Value Date     11/12/2021    K 4.6 11/12/2021     11/12/2021    CO2 28 11/12/2021    BUN 14 11/12/2021    CREATININE 0.78 11/12/2021    GFRAA >60.0 11/12/2021    LABGLOM >60.0 11/12/2021    GLUCOSE 146 11/12/2021    PROT 8.3 11/12/2021    LABALBU 4.8 11/12/2021    CALCIUM 9.6 11/12/2021    BILITOT 0.3 11/12/2021    ALKPHOS 104 11/12/2021    AST 26 11/12/2021    ALT 39 11/12/2021     BMP:    Lab Results   Component Value Date     11/12/2021    K 4.6 11/12/2021     11/12/2021    CO2 28 11/12/2021    BUN 14 11/12/2021    LABALBU 4.8 11/12/2021    CREATININE 0.78 11/12/2021    CALCIUM 9.6 11/12/2021    GFRAA >60.0 11/12/2021    LABGLOM >60.0 11/12/2021    GLUCOSE 146 11/12/2021     Magnesium:    Lab Results   Component Value Date    MG 2.2 11/13/2021     Troponin:    Lab Results   Component Value Date    TROPONINI 1.580 08/26/2021       Radiology:  CTA NECK W WO CONTRAST    Result Date: 11/13/2021  EXAM: CT angiogram neck with contrast History: Bilateral carotid stenosis. Technique: Multiple contiguous axial images were obtained from the thoracic inlet through the skull base after administration of intravenous contrast. Multiplanar reformats and multiplanar maximum intensity projection images were obtained, as were postprocessed 3-D volume rendered images. Luminal narrowings are estimated using NASCET criteria. Comparison: Findings: There is a three-vessel  aortic arch with normal origins of the brachiocephalic, left common carotid and left subclavian arteries. The vertebral arteries originate from the subclavian arteries. No significant stenosis of the great vessel origins or the origin of the carotid or vertebral arteries.  The bilateral common carotid arteries are of normal course and caliber. The bilateral external carotid arteries are of normal course and caliber. Occlusion of the right internal carotid artery approximately 12 mm distal to its the origin with occlusion extending through the supraclinoid segment. Near occlusion of the proximal left internal carotid artery at its origin secondary to calcified and soft plaque. Posterior circulation demonstrates normal extracranial vertebral artery caliber, without significant stenosis. No dissection, high grade stenosis or aneurysm. Occlusion of the right internal carotid artery approximately 12 mm distal to its origin through the supraclinoid segment. Near occlusion of the proximal left internal carotid artery at its origin secondary to calcified and soft plaque. All CT scans at this facility use dose modulation, iterative reconstruction, and/or weight based dosing when appropriate to reduce radiation dose to as low as reasonably achievable. US CAROTID ARTERY BILATERAL    Result Date: 11/12/2021  EXAMINATION:  CAROTID DUPLEX ULTRASONOGRAPHY CLINICAL HISTORY:  CAROTID BRUIT COMPARISONS:  NONE AVAILABLE TECHNIQUE:  B-mode, color flow and spectral Doppler FINDINGS:  ARTERIAL BLOOD FLOW VELOCITY RIGHT PS                                               Prox CCA    87 cm/s             Mid CCA     104 cm/s              Dist CCA    90 cm/s              Prox ICA    126 cm/s               Mid ICA     110 cm/s            Dist ICA cm/s             Prox ECA    152 cm/s             Prox VERT cm/s              ICA/CCA     1.21                        LEFT PS Prox CCA    96 cm/s Mid CCA     93 cm/s Dist CCA    103 cm/s Prox ICA    781 cm/s Mid ICA     141 cm/s Dist ICA    70 cm/s Prox ECA    360 cm/s Prox VERT cm/s ICA/CCA     8.39     RIGHT ICA DISTALLY IS OCCLUDED. PROXIMAL LEFT ICA SEVERE STENOSIS CRITICAL GREATER THAN 90%. THERE IS DIFFUSE ATHEROSCLEROSIS THROUGHOUT BOTH CAROTID TREES.  BILATERAL ANTEGRADE VERTEBRAL FLOW.        Assessment:    Active Hospital Problems    Diagnosis Date Noted    Carotid occlusion, bilateral [I65.23] 11/12/2021     Priority: Low      Impression     Severe b/l carotid stenosis  Cad s/p pci   Hx of stemi  Tobacco abuse  Essential hypertension     Plan     Continue with heparin drip -discontinue 2 hours prior to carotid angio tomorrow  Continue with dual antiplatelet therapy  carotid angiogram/stenting on Monday. Maximize cardiac medications -hold blood pressure medicines Monday morning prior to carotid angio  Monitor on telemetry  GI/DVT prophylaxis  Smoking cessation follow recommended  Further recommendations to follow       Electronically signed by Deborah Riedel, APRN - CNP on 11/14/2021 at 11:37 AM    Attending Supervising [de-identified] Attestation Statement  The patient is a 46 y.o. female. I have performed a history and physical examination of the patient. I discussed the case with the nurse practitioner. I reviewed the patient's Past Medical History, Past Surgical History, Medications, and Allergies.      Physical Exam:  Vitals:    11/13/21 0930 11/13/21 1911 11/13/21 2110 11/14/21 0646   BP:  (!) 142/78  (!) 143/74   Pulse: 69 66 69 63   Resp:  16  16   Temp:  98.1 °F (36.7 °C)  98.2 °F (36.8 °C)   TempSrc:  Oral  Oral   SpO2:  99%  99%   Weight:       Height:           Review of Systems - Respiratory ROS: no cough, shortness of breath, or wheezing  Cardiovascular ROS: no chest pain or dyspnea on exertion  Gastrointestinal ROS: no abdominal pain, change in bowel habits, or black or bloody stools    Pulmonary/Chest: clear to auscultation bilaterally- no wheezes, rales or rhonchi, normal air movement, no respiratory distress  Cardiovascular: normal rate, normal S1 and S2, no gallops, intact distal pulses and no carotid bruits  Abdomen: soft, non-tender, non-distended, normal bowel sounds, no masses or organomegaly    Active Hospital Problems    Diagnosis Date Noted    Carotid occlusion, bilateral [I65.23] 11/12/2021     Priority: Low        I reviewed and agree with the findings and plan documented in her note . Impression     Severe b/l carotid stenosis  Cad s/p pci   Hx of stemi  Tobacco abuse  Essential hypertension     Plan     Continue with heparin drip  Continue with dual antiplatelet therapy  We will plan for carotid angiogram/stenting tomorrow- risks, benefits and alternatives to procedure discussed in detail and patient wishing to proceed. Understands risk of CVA, vascular complications, death.    Maximize cardiac medications  Monitor on telemetry  GI/DVT prophylaxis  Smoking cessation follow recommended  Further recommendations to follow          Electronically signed by Davion Jordan DO on 11/14/21 at 1:37 PM EST

## 2021-11-14 NOTE — FLOWSHEET NOTE
Medicated with 650mg PO tylenol for complaints of 2/10 headache. No signs of any acute distress noted. Call light remains in reach.  Electronically signed by Nav Johns RN on 11/14/2021 at 5:50 PM

## 2021-11-14 NOTE — FLOWSHEET NOTE
Pt AM assessment completed. SpO2 99% room air, unlabored breathing, symmetrical, and clear. Pt denies chest pain, telemetry on with normal sinus rhythm. Bruie on left carotid noted. Pt on consult/procedure for cath lab Monday, 11/15/21. Rounding with NP confirmed hold heparin drip two hours prior to procedure and no AM BP meds on 11/15/21. Pt in bed, independent with movements and ambulation. Pedal pulses palpable. No edema noted. No skin integrity issues. A/Ox4, calm and cooperative. Last BM 11/13/21. Pt denies any pain on elimination. Saline lock #20 Left AC patent and flushed. VS stable. AM medications provided. Pt resting comfortably with call light within reach. I agree with the above student assessment. Patient is aware of carotid angiogram for 11/15/21. Complains of a headache at this time rated 3/10. Medicated with 650mg PO tylenol. No signs of any acute distress noted. Call light remains in reach.  Electronically signed by Harsh Medrano RN on 11/14/2021 at 1:01 PM

## 2021-11-15 ENCOUNTER — APPOINTMENT (OUTPATIENT)
Dept: CARDIAC CATH/INVASIVE PROCEDURES | Age: 51
DRG: 036 | End: 2021-11-15
Payer: COMMERCIAL

## 2021-11-15 LAB
ANTI-XA UNFRAC HEPARIN: 0.61 IU/ML
CHOLESTEROL, TOTAL: 169 MG/DL (ref 0–199)
EKG ATRIAL RATE: 64 BPM
EKG P AXIS: 59 DEGREES
EKG P-R INTERVAL: 174 MS
EKG Q-T INTERVAL: 442 MS
EKG QRS DURATION: 78 MS
EKG QTC CALCULATION (BAZETT): 455 MS
EKG R AXIS: 16 DEGREES
EKG T AXIS: 56 DEGREES
EKG VENTRICULAR RATE: 64 BPM
HCT VFR BLD CALC: 42.4 % (ref 37–47)
HDLC SERPL-MCNC: 43 MG/DL (ref 40–59)
HEMOGLOBIN: 13.8 G/DL (ref 12–16)
INR BLD: 1
LDL CHOLESTEROL CALCULATED: 87 MG/DL (ref 0–129)
MCH RBC QN AUTO: 26.8 PG (ref 27–31.3)
MCHC RBC AUTO-ENTMCNC: 32.5 % (ref 33–37)
MCV RBC AUTO: 82.6 FL (ref 82–100)
PDW BLD-RTO: 15.1 % (ref 11.5–14.5)
PLATELET # BLD: 312 K/UL (ref 130–400)
PROTHROMBIN TIME: 13.1 SEC (ref 12.3–14.9)
RBC # BLD: 5.13 M/UL (ref 4.2–5.4)
TRIGL SERPL-MCNC: 197 MG/DL (ref 0–150)
WBC # BLD: 7.1 K/UL (ref 4.8–10.8)

## 2021-11-15 PROCEDURE — C1760 CLOSURE DEV, VASC: HCPCS

## 2021-11-15 PROCEDURE — 6360000002 HC RX W HCPCS

## 2021-11-15 PROCEDURE — C1884 EMBOLIZATION PROTECT SYST: HCPCS

## 2021-11-15 PROCEDURE — 93010 ELECTROCARDIOGRAM REPORT: CPT | Performed by: INTERNAL MEDICINE

## 2021-11-15 PROCEDURE — 2500000003 HC RX 250 WO HCPCS

## 2021-11-15 PROCEDURE — 85027 COMPLETE CBC AUTOMATED: CPT

## 2021-11-15 PROCEDURE — 36415 COLL VENOUS BLD VENIPUNCTURE: CPT

## 2021-11-15 PROCEDURE — 6360000002 HC RX W HCPCS: Performed by: NURSE PRACTITIONER

## 2021-11-15 PROCEDURE — 85520 HEPARIN ASSAY: CPT

## 2021-11-15 PROCEDURE — C1876 STENT, NON-COA/NON-COV W/DEL: HCPCS

## 2021-11-15 PROCEDURE — 37215 TRANSCATH STENT CCA W/EPS: CPT | Performed by: INTERNAL MEDICINE

## 2021-11-15 PROCEDURE — 85610 PROTHROMBIN TIME: CPT

## 2021-11-15 PROCEDURE — 2580000003 HC RX 258: Performed by: INTERNAL MEDICINE

## 2021-11-15 PROCEDURE — 2709999900 HC NON-CHARGEABLE SUPPLY

## 2021-11-15 PROCEDURE — C1725 CATH, TRANSLUMIN NON-LASER: HCPCS

## 2021-11-15 PROCEDURE — 2580000003 HC RX 258: Performed by: NURSE PRACTITIONER

## 2021-11-15 PROCEDURE — 6370000000 HC RX 637 (ALT 250 FOR IP): Performed by: INTERNAL MEDICINE

## 2021-11-15 PROCEDURE — C1769 GUIDE WIRE: HCPCS

## 2021-11-15 PROCEDURE — 2000000000 HC ICU R&B

## 2021-11-15 PROCEDURE — 6360000004 HC RX CONTRAST MEDICATION: Performed by: INTERNAL MEDICINE

## 2021-11-15 PROCEDURE — C1894 INTRO/SHEATH, NON-LASER: HCPCS

## 2021-11-15 PROCEDURE — 80061 LIPID PANEL: CPT

## 2021-11-15 RX ORDER — DIPHENHYDRAMINE HCL 25 MG
50 TABLET ORAL ONCE
Status: DISCONTINUED | OUTPATIENT
Start: 2021-11-15 | End: 2021-11-17 | Stop reason: HOSPADM

## 2021-11-15 RX ORDER — SODIUM CHLORIDE 9 MG/ML
INJECTION, SOLUTION INTRAVENOUS CONTINUOUS
Status: DISCONTINUED | OUTPATIENT
Start: 2021-11-15 | End: 2021-11-17 | Stop reason: HOSPADM

## 2021-11-15 RX ORDER — ASPIRIN 81 MG/1
81 TABLET ORAL ONCE
Status: DISCONTINUED | OUTPATIENT
Start: 2021-11-15 | End: 2021-11-17 | Stop reason: HOSPADM

## 2021-11-15 RX ORDER — NITROGLYCERIN 0.4 MG/1
0.4 TABLET SUBLINGUAL EVERY 5 MIN PRN
Status: DISCONTINUED | OUTPATIENT
Start: 2021-11-15 | End: 2021-11-17 | Stop reason: HOSPADM

## 2021-11-15 RX ORDER — SODIUM CHLORIDE 9 MG/ML
25 INJECTION, SOLUTION INTRAVENOUS PRN
Status: DISCONTINUED | OUTPATIENT
Start: 2021-11-15 | End: 2021-11-17 | Stop reason: HOSPADM

## 2021-11-15 RX ORDER — ONDANSETRON 2 MG/ML
4 INJECTION INTRAMUSCULAR; INTRAVENOUS EVERY 6 HOURS PRN
Status: DISCONTINUED | OUTPATIENT
Start: 2021-11-15 | End: 2021-11-17 | Stop reason: HOSPADM

## 2021-11-15 RX ORDER — SODIUM CHLORIDE 9 MG/ML
INJECTION, SOLUTION INTRAVENOUS CONTINUOUS
Status: DISPENSED | OUTPATIENT
Start: 2021-11-15 | End: 2021-11-15

## 2021-11-15 RX ORDER — SODIUM CHLORIDE 0.9 % (FLUSH) 0.9 %
5-40 SYRINGE (ML) INJECTION EVERY 12 HOURS SCHEDULED
Status: DISCONTINUED | OUTPATIENT
Start: 2021-11-15 | End: 2021-11-17 | Stop reason: HOSPADM

## 2021-11-15 RX ORDER — SODIUM CHLORIDE 0.9 % (FLUSH) 0.9 %
5-40 SYRINGE (ML) INJECTION PRN
Status: DISCONTINUED | OUTPATIENT
Start: 2021-11-15 | End: 2021-11-17 | Stop reason: HOSPADM

## 2021-11-15 RX ORDER — PREDNISONE 50 MG/1
50 TABLET ORAL ONCE
Status: DISCONTINUED | OUTPATIENT
Start: 2021-11-15 | End: 2021-11-17 | Stop reason: HOSPADM

## 2021-11-15 RX ADMIN — SODIUM CHLORIDE: 9 INJECTION, SOLUTION INTRAVENOUS at 08:15

## 2021-11-15 RX ADMIN — ASPIRIN 81 MG: 81 TABLET, CHEWABLE ORAL at 08:14

## 2021-11-15 RX ADMIN — ONDANSETRON 4 MG: 2 INJECTION INTRAMUSCULAR; INTRAVENOUS at 12:32

## 2021-11-15 RX ADMIN — ATORVASTATIN CALCIUM 40 MG: 40 TABLET, FILM COATED ORAL at 20:14

## 2021-11-15 RX ADMIN — SODIUM CHLORIDE: 9 INJECTION, SOLUTION INTRAVENOUS at 17:49

## 2021-11-15 RX ADMIN — SODIUM CHLORIDE: 9 INJECTION, SOLUTION INTRAVENOUS at 12:00

## 2021-11-15 RX ADMIN — ACETAMINOPHEN 650 MG: 325 TABLET ORAL at 20:17

## 2021-11-15 RX ADMIN — CLOPIDOGREL BISULFATE 75 MG: 75 TABLET, FILM COATED ORAL at 08:14

## 2021-11-15 RX ADMIN — IOPAMIDOL 65 ML: 612 INJECTION, SOLUTION INTRAVENOUS at 10:12

## 2021-11-15 ASSESSMENT — PAIN DESCRIPTION - ORIENTATION: ORIENTATION: RIGHT;LEFT

## 2021-11-15 ASSESSMENT — PAIN SCALES - GENERAL
PAINLEVEL_OUTOF10: 5
PAINLEVEL_OUTOF10: 5

## 2021-11-15 ASSESSMENT — PAIN DESCRIPTION - PAIN TYPE: TYPE: ACUTE PAIN

## 2021-11-15 ASSESSMENT — PAIN DESCRIPTION - LOCATION: LOCATION: GROIN

## 2021-11-15 NOTE — PROGRESS NOTES
1230 Left groin site bleeding. Pressure held for 15 minutes with hemostasis obtained. 1300- right groin sheath removed. Manual pressure with hemostasis obtained.

## 2021-11-15 NOTE — BRIEF OP NOTE
Section of Cardiology  Adult Brief Carotid angio Procedure Note        Procedure(s):  Left carotid angio, left ICA stent with distal embolic protection     Pre-operative Diagnosis:  Abn non invasive testing. H&P Status: Completed and reviewed.      Post-operative Diagnosis:      Left Carotid:   CCA patent  ICA 95% ostial  ECA mild disease    Findings:  See full report    Complications:  none    Primary Proceduralist:   Dr. Bunny Stein    DAPT  ICU monitoring  Max cardiac meds      Full procedure note to follow

## 2021-11-15 NOTE — PROGRESS NOTES
Right and left groin intact. No bleeding or hemtoma noted. Pt c/o back pain and is aware is on bedrest until 1730  Pt rolled to her right side keeping both legs straight. Back pain relieved and pt returned to her back after 10-15 minutes.

## 2021-11-15 NOTE — FLOWSHEET NOTE
Patient to room 5 from cath lab, laying flat until 5:30pm, then sitting up to eat and drink per diet order. Blood pressure on lower side and HR in 40-50's, MAP 50's and Dr. Nohemi Kelsey aware via perfect serve. Blood pressure then fluctuating and HR in 60's with Maps in 70's.

## 2021-11-15 NOTE — PROGRESS NOTES
Pt resting comfortably in bed after cath/stent placement. Pt denies any chest pain or shortness of breath at this time. Left side femoral sheath removed and removed with closure device, no swelling, tenderness or active bleeding. Right side femoral area has a 6 fr sheath in place, no swelling, no tenderness. Sheath to be removed after ACT is less than 180. Will draw ACT at 1145.

## 2021-11-16 LAB
ANION GAP SERPL CALCULATED.3IONS-SCNC: 11 MEQ/L (ref 9–15)
ANTI-XA UNFRAC HEPARIN: <0.1 IU/ML
BUN BLDV-MCNC: 12 MG/DL (ref 6–20)
CALCIUM SERPL-MCNC: 8.9 MG/DL (ref 8.5–9.9)
CHLORIDE BLD-SCNC: 104 MEQ/L (ref 95–107)
CO2: 22 MEQ/L (ref 20–31)
CREAT SERPL-MCNC: 0.68 MG/DL (ref 0.5–0.9)
GFR AFRICAN AMERICAN: >60
GFR NON-AFRICAN AMERICAN: >60
GLUCOSE BLD-MCNC: 76 MG/DL (ref 70–99)
HCT VFR BLD CALC: 34.5 % (ref 37–47)
HEMOGLOBIN: 11.6 G/DL (ref 12–16)
MCH RBC QN AUTO: 28 PG (ref 27–31.3)
MCHC RBC AUTO-ENTMCNC: 33.6 % (ref 33–37)
MCV RBC AUTO: 83.3 FL (ref 82–100)
PDW BLD-RTO: 15.2 % (ref 11.5–14.5)
PLATELET # BLD: 266 K/UL (ref 130–400)
POTASSIUM SERPL-SCNC: 4 MEQ/L (ref 3.4–4.9)
RBC # BLD: 4.15 M/UL (ref 4.2–5.4)
SODIUM BLD-SCNC: 137 MEQ/L (ref 135–144)
WBC # BLD: 6.6 K/UL (ref 4.8–10.8)

## 2021-11-16 PROCEDURE — 80048 BASIC METABOLIC PNL TOTAL CA: CPT

## 2021-11-16 PROCEDURE — 99024 POSTOP FOLLOW-UP VISIT: CPT | Performed by: INTERNAL MEDICINE

## 2021-11-16 PROCEDURE — 85520 HEPARIN ASSAY: CPT

## 2021-11-16 PROCEDURE — 85027 COMPLETE CBC AUTOMATED: CPT

## 2021-11-16 PROCEDURE — 2580000003 HC RX 258: Performed by: INTERNAL MEDICINE

## 2021-11-16 PROCEDURE — 36415 COLL VENOUS BLD VENIPUNCTURE: CPT

## 2021-11-16 PROCEDURE — 2060000000 HC ICU INTERMEDIATE R&B

## 2021-11-16 PROCEDURE — 2580000003 HC RX 258: Performed by: NURSE PRACTITIONER

## 2021-11-16 PROCEDURE — 6370000000 HC RX 637 (ALT 250 FOR IP): Performed by: INTERNAL MEDICINE

## 2021-11-16 RX ADMIN — Medication 10 ML: at 22:00

## 2021-11-16 RX ADMIN — CLOPIDOGREL BISULFATE 75 MG: 75 TABLET, FILM COATED ORAL at 09:40

## 2021-11-16 RX ADMIN — ACETAMINOPHEN 650 MG: 325 TABLET ORAL at 21:59

## 2021-11-16 RX ADMIN — SODIUM CHLORIDE: 9 INJECTION, SOLUTION INTRAVENOUS at 06:24

## 2021-11-16 RX ADMIN — Medication 10 ML: at 03:12

## 2021-11-16 RX ADMIN — ASPIRIN 81 MG: 81 TABLET, CHEWABLE ORAL at 09:39

## 2021-11-16 RX ADMIN — SODIUM CHLORIDE, PRESERVATIVE FREE 10 ML: 5 INJECTION INTRAVENOUS at 22:00

## 2021-11-16 RX ADMIN — SODIUM CHLORIDE, PRESERVATIVE FREE 10 ML: 5 INJECTION INTRAVENOUS at 03:11

## 2021-11-16 RX ADMIN — OXYCODONE HYDROCHLORIDE AND ACETAMINOPHEN 500 MG: 500 TABLET ORAL at 09:39

## 2021-11-16 RX ADMIN — ACETAMINOPHEN 650 MG: 325 TABLET ORAL at 03:08

## 2021-11-16 RX ADMIN — CARVEDILOL 3.12 MG: 3.12 TABLET, FILM COATED ORAL at 21:54

## 2021-11-16 RX ADMIN — ATORVASTATIN CALCIUM 40 MG: 40 TABLET, FILM COATED ORAL at 21:54

## 2021-11-16 RX ADMIN — Medication 1 CAPSULE: at 13:37

## 2021-11-16 ASSESSMENT — PAIN SCALES - GENERAL
PAINLEVEL_OUTOF10: 5
PAINLEVEL_OUTOF10: 0
PAINLEVEL_OUTOF10: 5
PAINLEVEL_OUTOF10: 0

## 2021-11-16 ASSESSMENT — PAIN DESCRIPTION - LOCATION: LOCATION: HEAD

## 2021-11-16 ASSESSMENT — PAIN DESCRIPTION - PAIN TYPE: TYPE: ACUTE PAIN

## 2021-11-16 NOTE — PROGRESS NOTES
1900- Received handoff report at bedside from \[facility]\"". Patient AxOx4. Complains of pain 5/10 in bilateral groin. Acetaminophen given. HR in the 50s, Coreg held. Perfect Serve to Dr. Aminah Oneal pertaining to 2 Lipitor orders in [facility], one for 40mg and another for 80mg. Dr. Aminah Oneal advised to give the 40 mg.    2000- Patient ambulated to toilet with RN standing by. Tolerated well and vital signs remained stable. 0300-Up to toilet, RN standing by. Brushed teeth. Pain level 5/10. Patient asked for acetaminophen. 0796- Bedside handoff report given to Jo Carmichael RN.

## 2021-11-16 NOTE — PROGRESS NOTES
Chief Complaint   Patient presents with    Other     carotid artery blockage, sent from Dr Garcia Mean: Patient seen intensive care unit. Blood pressure was low this morning but much improved. She denies any chest pain or shortness of breath. Bilateral groin sites are okay without any hematoma. She is on dual antiplatelet therapy. No bleeding issues. No neurological issues overnight.     Past Medical History:   Diagnosis Date    Abnormal Pap smear of cervix     CAD (coronary artery disease)     Diverticular disease     Diverticulitis      Patient Active Problem List   Diagnosis    Diverticulosis    Irregular periods    Uterine leiomyoma    Rash and nonspecific skin eruption    Acute gastritis    Generalized abdominal pain    DUB (dysfunctional uterine bleeding)    Tobacco abuse    Acute myocardial infarction ([facility] [address]    Carotid occlusion, bilateral       Current Facility-Administered Medications   Medication Dose Route Frequency Provider Last Rate Last Admin    0.9 % sodium chloride infusion   IntraVENous Continuous Gevivia ADRIANA Mello - CNP 75 mL/hr at 11/16/21 0624 New Bag at 11/16/21 0624    sodium chloride flush 0.9 % injection 5-40 mL  5-40 mL IntraVENous 2 times per day ADRIANA Stevenson - CNP   10 mL at 11/16/21 0311    sodium chloride flush 0.9 % injection 5-40 mL  5-40 mL IntraVENous PRN Saloni Certain Jorge, APRN - CNP        0.9 % sodium chloride infusion  25 mL IntraVENous PRN Trisha Mello APRN - CNP        aspirin EC tablet 81 mg  81 mg Oral Once Trisha Mello APRN - CNP        ondansetron (ZOFRAN) injection 4 mg  4 mg IntraVENous Q6H PRN Trisha Mello APRN - CNP   4 mg at 11/15/21 1232    predniSONE (DELTASONE) tablet 50 mg  50 mg Oral Once Raffi Patel, APRN - CNP        diphenhydrAMINE (BENADRYL) tablet 50 mg  50 mg Oral Once Saloni Patel, APRN - CNP        hydrocortisone sodium succinate PF (SOLU-CORTEF) injection 200 mg  200 mg IntraVENous Once ADRIANA Hall CNP        nitroGLYCERIN (NITROSTAT) SL tablet 0.4 mg  0.4 mg SubLINGual Q5 Min PRN ADRIANA Hall CNP        sodium chloride flush 0.9 % injection 5-40 mL  5-40 mL IntraVENous 2 times per day Greg Mujica MD   10 mL at 11/16/21 0312    sodium chloride flush 0.9 % injection 5-40 mL  5-40 mL IntraVENous PRN Greg Mujica MD        0.9 % sodium chloride infusion  25 mL IntraVENous PRN Greg Mujica MD        ondansetron (ZOFRAN-ODT) disintegrating tablet 4 mg  4 mg Oral Q8H PRN Greg Mujica MD        Or    [facility] injection 4 mg  4 mg IntraVENous Q6H PRN Greg Mujica MD        acetaminophen (TYLENOL) tablet 650 mg  650 mg Oral Q6H PRN Greg Mujica MD   650 mg at 11/16/21 0308    Or    acetaminophen (TYLENOL) suppository 650 mg  650 mg Rectal Q6H PRN Greg Mujica MD        polyethylene glycol (GLYCOLAX) packet 17 g  17 g Oral Daily PRN Greg Mujica MD        atorvastatin (LIPITOR) tablet 80 mg  80 mg Oral Nightly Greg Mujica MD        heparin (porcine) injection 5,620 Units  80 Units/kg IntraVENous Once Celio Nur DO        heparin (porcine) injection 5,620 Units  80 Units/kg IntraVENous PRN Celio Nur DO        heparin (porcine) injection 2,810 Units  40 Units/kg IntraVENous PRN Celio Nur DO        ascorbic acid (VITAMIN C) tablet 500 mg  500 mg Oral Daily Wes J Holiday, DO   500 mg at 11/16/21 4620    aspirin chewable tablet 81 mg  81 mg Oral Daily Zeferino J Holiday, DO   81 mg at 11/16/21 2649    atorvastatin (LIPITOR) tablet 40 mg  40 mg Oral Nightly Zeferino J Holiday, DO   40 mg at 11/15/21 2014    b complex vitamins capsule 1 capsule  1 capsule Oral Daily Wes J Holiday, DO   1 capsule at 11/16/21 1337    carvedilol (COREG) tablet 3.125 mg  3.125 mg Oral BID Zeferino J Holiday, DO   3.125 mg at 11/14/21 2057    clopidogrel (PLAVIX) tablet 75 mg  75 mg Oral Daily Zeferino J Holiday, DO   75 mg at 11/16/21 0940    nitroGLYCERIN (NITROSTAT) SL tablet 0.4 mg  0.4 mg SubLINGual Q5 Min PRN Zeferino J Holiday, DO           ALLERGIES: Sulfa antibiotics      OBJECTIVE:     VITALS:  Blood pressure (!) 132/58, pulse 64, temperature 98.2 °F (36.8 °C), temperature source Oral, resp. rate 18, height 5' 4\" (1.626 m), weight 162 lb 14.7 oz (73.9 kg), SpO2 100 %. Body mass index is 27.97 kg/m². Physical Exam  Constitutional:       Appearance: She is well-developed. She is not diaphoretic. HENT:      Head: Normocephalic and atraumatic. Eyes:      Pupils: Pupils are equal, round, and reactive to light. Neck:      Thyroid: No thyromegaly. Vascular: No JVD. Trachea: No tracheal deviation. Cardiovascular:      Rate and Rhythm: Normal rate and regular rhythm. Chest Wall: PMI is not displaced. Pulses: Intact distal pulses. Heart sounds: Normal heart sounds. Heart sounds not distant. No murmur heard. No friction rub. No gallop. No S3 sounds. Pulmonary:      Effort: No respiratory distress. Breath sounds: No wheezing or rales. Chest:      Chest wall: No tenderness. Abdominal:      General: Bowel sounds are normal. There is no distension. Palpations: Abdomen is soft. There is no mass. Tenderness: There is no abdominal tenderness. There is no guarding or rebound. Musculoskeletal:      Cervical back: Normal range of motion and neck supple. Skin:     General: Skin is warm and dry. Coloration: Skin is not pale. Findings: No erythema or rash. Neurological:      Mental Status: She is alert and oriented to person, place, and time. Cranial Nerves: No cranial nerve deficit. Psychiatric:         Behavior: Behavior normal.         Thought Content:  Thought content normal.         Judgment: Judgment normal.           LABS:  Recent Results (from the past 24 hour(s))   HEPARIN LEVEL/ANTI-XA    Collection Time: 11/16/21  4:56 AM   Result Value Ref Range    Anti-XA Unfrac Heparin <0.10 IU/mL   Basic metabolic panel Collection Time: 11/16/21  4:56 AM   Result Value Ref Range    Sodium 137 135 - 144 mEq/L    Potassium 4.0 3.4 - 4.9 mEq/L    Chloride 104 95 - 107 mEq/L    CO2 22 20 - 31 mEq/L    Anion Gap 11 9 - 15 mEq/L    Glucose 76 70 - 99 mg/dL    BUN 12 6 - 20 mg/dL    CREATININE 0.68 0.50 - 0.90 mg/dL    GFR Non-African American >60.0 >60    GFR  >60.0 >60    Calcium 8.9 8.5 - 9.9 mg/dL   CBC    Collection Time: 11/16/21  4:56 AM   Result Value Ref Range    WBC 6.6 4.8 - 10.8 K/uL    RBC 4.15 (L) 4.20 - 5.40 M/uL    Hemoglobin 11.6 (L) 12.0 - 16.0 g/dL    Hematocrit 34.5 (L) 37.0 - 47.0 %    MCV 83.3 82.0 - 100.0 fL    MCH 28.0 27.0 - 31.3 pg    MCHC 33.6 33.0 - 37.0 %    RDW 15.2 (H) 11.5 - 14.5 %    Platelets 006 833 - 843 K/uL     Troponin:    Lab Results   Component Value Date    TROPONINI 1.580 08/26/2021             ASSESSMENT:    Active Hospital Problems    Diagnosis Date Noted    Carotid occlusion, bilateral [I65.23] 11/12/2021     Priority: Low     Severe carotid artery disease status post left ICA carotid artery stenting with distal embolic protection  History of CAD status post PCI  Essential hypertension  Tobacco abuse  History of ST elevation MI  Known 100% right internal carotid artery stenosis    PLAN:   1. As always, aggressive risk factor modification is strongly recommended. We should adhere to the JNC VIII guidelines for HTN management and the NCEP ATP III guidelines for LDL-C management. 2. Continue with dual antiplatelet therapy  3. Okay to transfer to telemetry floor. 4. Keep potassium greater than 4, magnesium greater than 2  5. Neurovascular checks  6. GI/DVT prophylaxis  7. Holding blood pressure medications for now. Resume as tolerated  8. Smoking cessation strongly recommended.       Electronically signed by Mejia Arcos DO on 11/16/2021 at 4:38 PM

## 2021-11-16 NOTE — FLOWSHEET NOTE
Spiritual Care Services     Summary of Visit:  Pt was resting in bed during the visit, pt through her tears shared that she is thankful that she is doing better and to be alive, pt is hopeful and coping, encouraged the pt, pt thankful for the visit,     Spiritual Assessment/Intervention/Outcomes:    Encounter Summary  Services provided to[de-identified] (P) Patient  Referral/Consult From[de-identified] (P) Rounding  Support System: (P) Family members  Continue Visiting: (P) No  Complexity of Encounter: (P) Moderate  Length of Encounter: (P) 15 minutes  Spiritual Assessment Completed: (P) Yes  Routine  Type: (P) Initial     Spiritual/Zoroastrian  Type: (P) Spiritual struggle  Assessment: (P) Approachable, Tearful  Intervention: (P) Active listening, Explored feelings, thoughts, concerns, Sustaining presence/ Ministry of presence  Outcome: (P) Hopeful, Shared reminiscences, Engaged in conversation, Expressed gratitude, Comfort                         Care Plan:        [id number]   Electronically signed by Ej Wellington on 11/16/21 at 10:01 AM EST     To reach a  for emotional and spiritual support, place an [facility] consult request.   If a  is needed immediately, dial 0 and ask to page the on-call .

## 2021-11-16 NOTE — CARE COORDINATION
INTERDISCIPLINARY ROUNDING    November 16, 2021 at 12:18 PM EST    Anticipated Discharge Date:   11/16    Anticipated Discharge Disposition: HOME    Patient Mobility or PT/OT ordered:     Readmission Risk              Risk of Unplanned Readmission:  15           Discussed patient goal for the day, patient clinical progression, and barriers to discharge. The following Goal(s) of the Day/Commitment(s) have been identified:  S/P CAROTID STENT, [address] RN  November 16, 2021

## 2021-11-16 NOTE — FLOWSHEET NOTE
Pt transfer to floor from ICU, assessment completed. Pt 100% Sp02 on room air, unlabored breathing, chest symmetrical, bilateral lung sounds clear. Pt denies chest pain, telemetry on, regular sinus rhythm. Pt abdomen soft, non-tender, bowel sounds present all four quadrants. No skin integrity issues. #20 IV Left hand, and # 20 IV Left AC, both patent, flushed, saline locked and alcohol capped. Left and right groin dressings dry, intact; no site swelling, redness or pain noted. Pt A/Ox4, oriented to room, and eating dinner. Pt denies pain, resting comfortably and call light within reach. I agree with the above student assessment per [employer]. Patient sitting up in the chair at this time. Call light remains in reach.  Electronically signed by Zeynep Heredia RN on 11/16/2021 at 6:55 PM

## 2021-11-17 VITALS
TEMPERATURE: 98.2 F | HEIGHT: 64 IN | SYSTOLIC BLOOD PRESSURE: 130 MMHG | HEART RATE: 70 BPM | DIASTOLIC BLOOD PRESSURE: 73 MMHG | OXYGEN SATURATION: 100 % | WEIGHT: 162.92 LBS | RESPIRATION RATE: 16 BRPM | BODY MASS INDEX: 27.81 KG/M2

## 2021-11-17 PROCEDURE — 99024 POSTOP FOLLOW-UP VISIT: CPT | Performed by: INTERNAL MEDICINE

## 2021-11-17 PROCEDURE — 037L34Z DILATION OF LEFT INTERNAL CAROTID ARTERY WITH DRUG-ELUTING INTRALUMINAL DEVICE, PERCUTANEOUS APPROACH: ICD-10-PCS | Performed by: INTERNAL MEDICINE

## 2021-11-17 PROCEDURE — 2580000003 HC RX 258: Performed by: NURSE PRACTITIONER

## 2021-11-17 PROCEDURE — 6370000000 HC RX 637 (ALT 250 FOR IP): Performed by: INTERNAL MEDICINE

## 2021-11-17 PROCEDURE — APPSS30 APP SPLIT SHARED TIME 16-30 MINUTES: Performed by: NURSE PRACTITIONER

## 2021-11-17 RX ADMIN — ASPIRIN 81 MG: 81 TABLET, CHEWABLE ORAL at 09:09

## 2021-11-17 RX ADMIN — CARVEDILOL 3.12 MG: 3.12 TABLET, FILM COATED ORAL at 09:09

## 2021-11-17 RX ADMIN — OXYCODONE HYDROCHLORIDE AND ACETAMINOPHEN 500 MG: 500 TABLET ORAL at 09:09

## 2021-11-17 RX ADMIN — SODIUM CHLORIDE, PRESERVATIVE FREE 10 ML: 5 INJECTION INTRAVENOUS at 09:11

## 2021-11-17 RX ADMIN — Medication 1 CAPSULE: at 09:09

## 2021-11-17 RX ADMIN — CLOPIDOGREL BISULFATE 75 MG: 75 TABLET, FILM COATED ORAL at 09:09

## 2021-11-17 RX ADMIN — ACETAMINOPHEN 650 MG: 325 TABLET ORAL at 09:09

## 2021-11-17 ASSESSMENT — PAIN SCALES - GENERAL
PAINLEVEL_OUTOF10: 0
PAINLEVEL_OUTOF10: 0
PAINLEVEL_OUTOF10: 4

## 2021-11-17 NOTE — FLOWSHEET NOTE
Reviewed discharge instructions with patient. Discussed home going care and follow up appointments. All questions answered. Taken to main entrance via wheelchair for discharge to home.  Electronically signed by Jaz Edgar RN on 11/17/2021 at 12:14 PM

## 2021-11-17 NOTE — DISCHARGE INSTR - DIET
Good nutrition is important when healing from an illness, injury, or surgery. Follow any nutrition recommendations given to you during your hospital stay. If you were given an oral nutrition supplement while in the hospital, continue to take this supplement at home. You can take it with meals, in-between meals, and/or before bedtime. These supplements can be purchased at most local grocery stores, pharmacies, and chain [employer]-stores. If you have any questions about your diet or nutrition, call the hospital and ask for the dietitian.       May resume a low fat, low cholesterol diet as tolerated

## 2021-11-17 NOTE — FLOWSHEET NOTE
AM assessment completed. Patient resting in bed at this time. Denies any chest pain. Remains NSR on the monitor. No edema noted. Dressings removed from bilateral groin post carotid angiogram on 11/15/21. Bruising noted to each groin but sites remain soft. Denies any shortness of breath at this time. Lungs are clear bilaterally. SATS 100% on RA. She is A/Ox3 and can be up in the room independently. Denies any pain with elimination. Last BM 11/16/21. #20g SL to left AC, flushed and patent. #20g L  to left hand, flushed and patent. Vital signs stable and AM medications provided. Call light remains in reach.  Electronically signed by Harsh Medrano RN on 11/17/2021 at 12:13 PM

## 2021-11-17 NOTE — DISCHARGE SUMMARY
Cardiology Discharge Summary      Patient Identification:  Reba Rios  : 1970  MRN: [id number]   Account: [de-identified]     Admit date: 2021  Discharge date: 21  Attending provider: No att. providers found        Primary care provider: Heather Masterson PA-C     Admission Diagnoses: Bilateral carotid artery occlusion        Discharge Diagnoses: Active Hospital Problems    Diagnosis Date Noted    Carotid occlusion, bilateral [I65.23] 2021     Priority: [address] Course:   Reba Rios is a50 y.o. female admitted to [facility] on 2021 for abnormal carotid ultrasound results, 483% LIU, and 30% LICA. Patient had CTA of the neck which confirmed an occluded R ICA, and a near occluded LICA. Pt underwent  Left carotid angio, left ICA stent with distal embolic protection on . Blood pressures are stable. Pt denies chest pain, SOB, headache. Pt c/o mild left sided neck ache. Bilateral groin sites without hematoma. She is on DAPT, no bleeding issues. No neurological issues. Procedures:    Left carotid angio, left ICA stent with distal embolic protection on .      Consults:   none    Examination:  /73   Pulse 70   Temp 98.2 °F (36.8 °C) (Oral)   Resp 16   Ht 5' 4\" (1.626 m)   Wt 162 lb 14.7 oz (73.9 kg)   SpO2 100%   BMI 27.97 kg/m²    Physical Exam    Medications:  Current Discharge Medication List      CONTINUE these medications which have NOT CHANGED    Details   ascorbic acid (VITAMIN C) 500 MG tablet Take 500 mg by mouth daily      b complex vitamins capsule Take 1 capsule by mouth daily      atorvastatin (LIPITOR) 80 MG tablet Take 0.5 tablets by mouth nightly  Qty: 30 tablet, Refills: 3      carvedilol (COREG) 3.125 MG tablet TAKE 1 TABLET TWICE A DAY      aspirin 81 MG chewable tablet Take 1 tablet by mouth daily  Qty: 30 tablet, Refills: 3      nitroGLYCERIN (NITROSTAT) 0.4 MG SL tablet up to max of 3 total doses. If no relief after 1 dose, call 911. Qty: 25 tablet, Refills: 3      clopidogrel (PLAVIX) 75 MG tablet Take 1 tablet by mouth daily  Qty: 30 tablet, Refills: 3             Significant Diagnostics:   Radiology: CTA NECK W WO CONTRAST    Result Date: 11/13/2021  EXAM: CT angiogram neck with contrast History: Bilateral carotid stenosis. Technique: Multiple contiguous axial images were obtained from the thoracic inlet through the skull base after administration of intravenous contrast. Multiplanar reformats and multiplanar maximum intensity projection images were obtained, as were postprocessed 3-D volume rendered images. Luminal narrowings are estimated using NASCET criteria. Comparison: Findings: There is a three-vessel  aortic arch with normal origins of the brachiocephalic, left common carotid and left subclavian arteries. The vertebral arteries originate from the subclavian arteries. No significant stenosis of the great vessel origins or the origin of the carotid or vertebral arteries. The bilateral common carotid arteries are of normal course and caliber. The bilateral external carotid arteries are of normal course and caliber. Occlusion of the right internal carotid artery approximately 12 mm distal to its the origin with occlusion extending through the supraclinoid segment. Near occlusion of the proximal left internal carotid artery at its origin secondary to calcified and soft plaque. Posterior circulation demonstrates normal extracranial vertebral artery caliber, without significant stenosis. No dissection, high grade stenosis or aneurysm. Occlusion of the right internal carotid artery approximately 12 mm distal to its origin through the supraclinoid segment. Near occlusion of the proximal left internal carotid artery at its origin secondary to calcified and soft plaque.  All CT scans at this facility use dose modulation, iterative reconstruction, and/or weight based dosing when appropriate to reduce radiation dose to as low as reasonably achievable. US CAROTID ARTERY BILATERAL    Result Date: 11/12/2021  EXAMINATION:  CAROTID DUPLEX ULTRASONOGRAPHY CLINICAL HISTORY:  CAROTID BRUIT COMPARISONS:  NONE AVAILABLE TECHNIQUE:  B-mode, color flow and spectral Doppler FINDINGS:  ARTERIAL BLOOD FLOW VELOCITY RIGHT PS                                               Prox CCA    87 cm/s             Mid CCA     104 cm/s              Dist CCA    90 cm/s              Prox ICA    126 cm/s               Mid ICA     110 cm/s            Dist ICA cm/s             Prox ECA    152 cm/s             Prox VERT cm/s              ICA/CCA     1.21                        LEFT PS Prox CCA    96 cm/s Mid CCA     93 cm/s Dist CCA    103 cm/s Prox ICA    781 cm/s Mid ICA     141 cm/s Dist ICA    70 cm/s Prox ECA    360 cm/s Prox VERT cm/s ICA/CCA     8.39     RIGHT ICA DISTALLY IS OCCLUDED. PROXIMAL LEFT ICA SEVERE STENOSIS CRITICAL GREATER THAN 90%. THERE IS DIFFUSE ATHEROSCLEROSIS THROUGHOUT BOTH CAROTID TREES. BILATERAL ANTEGRADE VERTEBRAL FLOW.        Labs:   Recent Results (from the past 72 hour(s))   HEPARIN LEVEL/ANTI-XA    Collection Time: 11/15/21  7:02 AM   Result Value Ref Range    Anti-XA Unfrac Heparin 0.61 IU/mL   CBC    Collection Time: 11/15/21  7:02 AM   Result Value Ref Range    WBC 7.1 4.8 - 10.8 K/uL    RBC 5.13 4.20 - 5.40 M/uL    Hemoglobin 13.8 12.0 - 16.0 g/dL    Hematocrit 42.4 37.0 - 47.0 %    MCV 82.6 82.0 - 100.0 fL    MCH 26.8 (L) 27.0 - 31.3 pg    MCHC 32.5 (L) 33.0 - 37.0 %    RDW 15.1 (H) 11.5 - 14.5 %    Platelets 086 510 - 337 K/uL   Lipid panel    Collection Time: 11/15/21  7:02 AM   Result Value Ref Range    Cholesterol, Total 169 0 - 199 mg/dL    Triglycerides 197 (H) 0 - 150 mg/dL    HDL 43 40 - 59 mg/dL    LDL Calculated 87 0 - 129 mg/dL   Protime-INR    Collection Time: 11/15/21  7:02 AM   Result Value Ref Range    Protime 13.1 12.3 - 14.9 sec    INR 1. 0    HEPARIN LEVEL/ANTI-XA    Collection Time: 11/16/21  4:56 AM   Result Value Ref Range    Anti-XA Unfrac Heparin <0.10 IU/mL   Basic metabolic panel    Collection Time: 11/16/21  4:56 AM   Result Value Ref Range    Sodium 137 135 - 144 mEq/L    Potassium 4.0 3.4 - 4.9 mEq/L    Chloride 104 95 - 107 mEq/L    CO2 22 20 - 31 mEq/L    Anion Gap 11 9 - 15 mEq/L    Glucose 76 70 - 99 mg/dL    BUN 12 6 - 20 mg/dL    CREATININE 0.68 0.50 - 0.90 mg/dL    GFR Non-African American >60.0 >60    GFR  >60.0 >60    Calcium 8.9 8.5 - 9.9 mg/dL   CBC    Collection Time: 11/16/21  4:56 AM   Result Value Ref Range    WBC 6.6 4.8 - 10.8 K/uL    RBC 4.15 (L) 4.20 - 5.40 M/uL    Hemoglobin 11.6 (L) 12.0 - 16.0 g/dL    Hematocrit 34.5 (L) 37.0 - 47.0 %    MCV 83.3 82.0 - 100.0 fL    MCH 28.0 27.0 - 31.3 pg    MCHC 33.6 33.0 - 37.0 %    RDW 15.2 (H) 11.5 - 14.5 %    Platelets 532 139 - 411 K/uL       Follow-up visits:   No follow-up provider specified. Assessment:  Active Hospital Problems    Diagnosis Date Noted    Carotid occlusion, bilateral [I65.23] 11/12/2021     Priority: Low   Severe carotid artery disease status post left ICA carotid artery stenting with distal embolic protection  History of CAD status post PCI  Essential hypertension  Tobacco abuse  History of ST elevation MI  Known 100% right internal carotid artery stenosis     PLAN:   1. As always, aggressive risk factor modification is strongly recommended. We should adhere to the JNC VIII guidelines for HTN management and the NCEP ATP III guidelines for LDL-C management. 2. Continue with dual antiplatelet therapy  3. Keep potassium greater than 4, magnesium greater than 2  4. Neurovascular checks  5. GI/DVT prophylaxis  6. Smoking cessation strongly recommended. 7. Stable for DC home.   F/u with Dr. Miri Hidalgo            Electronically signed by ADRIANA Youngblood 11/17/2021 at 9:40 AM    Attending Supervising Physician's Attestation Statement  The patient is a 46 y.o. female. I have performed a history and physical examination of the patient. I discussed the case with the nurse practitioner. I reviewed the patient's Past Medical History, Past Surgical History, Medications, and Allergies. Physical Exam:  Vitals:    11/16/21 1724 11/16/21 1920 11/16/21 2150 11/17/21 0620   BP: (!) 117/57 126/61 130/65 130/73   Pulse: 61 75 72 70   Resp: 18 16  16   Temp: 98.8 °F (37.1 °C) 98.6 °F (37 °C)  98.2 °F (36.8 °C)   TempSrc: Oral Oral  Oral   SpO2: 100% 100% 100% 100%   Weight:       Height:           Review of Systems - Respiratory ROS: no cough, shortness of breath, or wheezing  Cardiovascular ROS: no chest pain or dyspnea on exertion  Gastrointestinal ROS: no abdominal pain, change in bowel habits, or black or bloody stools    Pulmonary/Chest: clear to auscultation bilaterally- no wheezes, rales or rhonchi, normal air movement, no respiratory distress  Cardiovascular: normal rate, normal S1 and S2, no gallops, intact distal pulses and no carotid bruits  Abdomen: soft, non-tender, non-distended, normal bowel sounds, no masses or organomegaly    Active Hospital Problems    Diagnosis Date Noted    Carotid occlusion, bilateral [I65.23] 11/12/2021     Priority: Low        I reviewed and agree with the findings and plan documented in her note . ASSESSMENT:           Active Hospital Problems     Diagnosis Date Noted    Carotid occlusion, bilateral [I65.23] 11/12/2021       Priority: Low      Severe carotid artery disease status post left ICA carotid artery stenting with distal embolic protection  History of CAD status post PCI  Essential hypertension  Tobacco abuse  History of ST elevation MI  Known 100% right internal carotid artery stenosis     PLAN:   1. As always, aggressive risk factor modification is strongly recommended.  We should adhere to the JNC VIII guidelines for HTN management and the NCEP ATP III guidelines for LDL-C

## 2021-11-18 NOTE — PROCEDURES
[address]                      [address], [address] [facility]                                 PROCEDURE NOTE    PATIENT NAME: Hailey Lovell                  :        1970  MED REC NO:   [id number]                            ROOM:       [id number]  ACCOUNT NO:   [de-identified]                           ADMIT DATE: 2021  PROVIDER:     Clint Schaefer DO    DATE OF PROCEDURE:  11/15/2021    OPERATION PERFORMED:  Left carotid angiogram, left internal carotid  artery stenting with distal embolic protection device. SURGEON:  Zeferino Schaefer DO    INDICATIONS:  Abnormal noninvasive testing. COMPLICATIONS:  None. ACCESS SITE:  Right femoral artery. OPERATIVE PROCEDURE:  The patient was brought to the cardiac cath lab  suite where she was sterilely prepped and draped in the usual fashion. A 1% lidocaine was used to anesthetize the right inguinal area and a  4-Tamazight arterial sheath was placed without any difficulty. Next, a  4-Tamazight AR MOD catheter was engaged in the left common carotid artery. Angiogram of the left carotid system was performed in [place] and [place]  projections with DSA. Next, cerebral images were performed in the OH  as well as AP projections. Next, over a TAD-2 wire, a 6-Tamazight 90 cm  sheath was placed in the left mid common carotid artery. An angiogram  of the left carotid system was again obtained. Next, a _____ NAV6  Emboshield 47 mm distal embolic protection device was deployed in the  left mid to distal common carotid artery and internal carotid artery and  predilatation of the left ostial internal carotid artery was performed  followed by stenting with a 7.9 x 40 mm Xact stent and postdilated with  a 5.0 x 20 mm _____ Trek balloon at nominal pressures. Next, the distal  embolic protection trocar was removed with a retrieval catheter and  complete angiography was performed showing excellent results. There are  no neurological events. The patient tolerated the procedure well. The  sheath was applied to the right common femoral artery and angiogram of  the right common femoral artery was performed and closure device was  deployed without any difficulty. The patient was transferred to the  intensive care unit in stable and satisfactory condition. FINDINGS:  1. Left common carotid artery is patent with no significant stenosis  with mild irregularities. Left internal carotid artery has a 95% ostial  stenosis/proximal stenosis. The left external carotid is patent. 2.  Cerebral vessels, the middle cerebral artery, as well as anterior  cerebral artery were widely patent. ASSESSMENT:  Status post successful left internal carotid artery  stenting with distal embolic protection device with less than 30%  residual stenosis. PLAN:  1. Postprocedure care as usual.  2.  Dual antiplatelet therapy. 3.  Monitor in the intensive care unit.         Charmaine Esquivel DO    D: 11/17/2021 20:54:04       T: 11/17/2021 22:57:34     [email or web address]  Job#: [id number]     Doc#: [id number]    CC:

## 2021-11-19 ENCOUNTER — TELEPHONE (OUTPATIENT)
Dept: FAMILY MEDICINE CLINIC | Age: 51
End: 2021-11-19

## 2021-11-19 ENCOUNTER — OFFICE VISIT (OUTPATIENT)
Dept: FAMILY MEDICINE CLINIC | Age: 51
End: 2021-11-19
Payer: COMMERCIAL

## 2021-11-19 VITALS
HEART RATE: 78 BPM | RESPIRATION RATE: 16 BRPM | BODY MASS INDEX: 28 KG/M2 | OXYGEN SATURATION: 98 % | WEIGHT: 164 LBS | DIASTOLIC BLOOD PRESSURE: 78 MMHG | HEIGHT: 64 IN | SYSTOLIC BLOOD PRESSURE: 118 MMHG

## 2021-11-19 DIAGNOSIS — I65.23 BILATERAL CAROTID ARTERY OCCLUSION: Primary | ICD-10-CM

## 2021-11-19 DIAGNOSIS — I10 HYPERTENSION, UNSPECIFIED TYPE: ICD-10-CM

## 2021-11-19 DIAGNOSIS — M79.605 LEG PAIN, LEFT: ICD-10-CM

## 2021-11-19 PROCEDURE — 99495 TRANSJ CARE MGMT MOD F2F 14D: CPT | Performed by: INTERNAL MEDICINE

## 2021-11-19 PROCEDURE — 1111F DSCHRG MED/CURRENT MED MERGE: CPT | Performed by: INTERNAL MEDICINE

## 2021-11-19 RX ORDER — IBUPROFEN 600 MG/1
600 TABLET ORAL 4 TIMES DAILY PRN
Qty: 40 TABLET | Refills: 0 | Status: SHIPPED | OUTPATIENT
Start: 2021-11-19 | End: 2021-12-06

## 2021-11-19 ASSESSMENT — ENCOUNTER SYMPTOMS
COUGH: 0
CHEST TIGHTNESS: 0
SHORTNESS OF BREATH: 0
WHEEZING: 0
VOMITING: 0
NAUSEA: 0

## 2021-11-19 NOTE — PROGRESS NOTES
Post-Discharge Transitional Care Management Services or Hospital Follow Up      Cleve Cooper   YOB: 1970    Date of Office Visit:  11/19/2021  Date of Hospital Admission: 11/12/21  Date of Hospital Discharge: 11/17/21  Risk of hospital readmission (high >=14%. Medium >=10%) :Readmission Risk Score: 9 ( )      Care management risk score Rising risk (score 2-5) and Complex Care (Scores >=6): 1     Non face to face  following discharge, date last encounter closed (first attempt may have been earlier): *No documented post hospital discharge outreach found in the last 14 days    Call initiated 2 business days of discharge: *No response recorded in the last 14 days    Patient Active Problem List   Diagnosis    Diverticulosis    Irregular periods    Uterine leiomyoma    Rash and nonspecific skin eruption    Acute gastritis    Generalized abdominal pain    DUB (dysfunctional uterine bleeding)    Tobacco abuse    Acute myocardial infarction ([address]    Carotid occlusion, bilateral       Allergies   Allergen Reactions    Sulfa Antibiotics Rash       Medications marked \"taking\" at this time  Outpatient Medications Marked as Taking for the 11/19/21 encounter (Office Visit) with Gael Ojeda MD   Medication Sig Dispense Refill    ibuprofen (ADVIL;MOTRIN) 600 MG tablet Take 1 tablet by mouth 4 times daily as needed for Pain 40 tablet 0    ascorbic acid (VITAMIN C) 500 MG tablet Take 500 mg by mouth daily      b complex vitamins capsule Take 1 capsule by mouth daily      atorvastatin (LIPITOR) 80 MG tablet Take 0.5 tablets by mouth nightly 30 tablet 3    carvedilol (COREG) 3.125 MG tablet TAKE 1 TABLET TWICE A DAY      aspirin 81 MG chewable tablet Take 1 tablet by mouth daily 30 tablet 3    nitroGLYCERIN (NITROSTAT) 0.4 MG SL tablet up to max of 3 total doses.  If no relief after 1 dose, call 911. 25 tablet 3    clopidogrel (PLAVIX) 75 MG tablet Take 1 tablet by mouth daily 30 tablet 3 Medications patient taking as of now reconciled against medications ordered at time of hospital discharge: Yes    Chief Complaint   Patient presents with    Follow-Up from Hospital     follow up from [facility] was admitted from 11/12/21-11/17/21 block carotids had surgery        History of Present illness - Follow up of Hospital diagnosis:  Bilateral Carotid Occlusion. Patient was admitted to [facility] on 11/12/21 following demonstration of bilateral carotid occlusion on Carotid US (372% LIU and 41% LICA). She had been previously evaluated and noted to have a left carotid bruit. Findings were confirmed on subsequent CTA of the neck. She underwent a Left Carotid Angio and Left ICA stent placement with distal embolic protection (DOS 27/92/61). Procedure was tolerated well without complications. Discharged home on 11/17/21. Inpatient course: Discharge summary reviewed- see chart. Interval history/Current status:    Patient has been doing well overall since discharge. She denies new-onset chest pain, SOB or HOWELL. Her only complaint is mild bruising over the groin catheter insertion sites which is receding. She is currently on DAPT and Statin therapy; reports compliance. She stopped smoking successfully in August and is adopting healthy lifestyle modifications. Cardiology follow up is scheduled for next week with Dr. Oleg Carcamo. Patient's only complaint today is left calf pain likely due to a muscle sprain. She denies preceding trauma, swelling, redness or tenderness. Review of Systems   Constitutional: Negative for activity change, chills, diaphoresis, fatigue and fever. HENT: Positive for ear pain (left; likely referred from carotid procedure). Respiratory: Negative for cough, chest tightness, shortness of breath and wheezing. Cardiovascular: Negative for chest pain, palpitations and leg swelling. Gastrointestinal: Negative for nausea and vomiting.    Neurological: Negative for dizziness, syncope, light-headedness and headaches. Vitals:    11/19/21 1435   BP: 118/78   Site: Left Upper Arm   Position: Sitting   Cuff Size: Medium Adult   Pulse: 78   Resp: 16   SpO2: 98%   Weight: 164 lb (74.4 kg)   Height: 5' 4\" (1.626 m)     Body mass index is 28.15 kg/m². Wt Readings from Last 3 Encounters:   11/19/21 164 lb (74.4 kg)   11/12/21 162 lb 14.7 oz (73.9 kg)   11/12/21 165 lb (74.8 kg)     BP Readings from Last 3 Encounters:   11/19/21 118/78   11/17/21 130/73   11/12/21 126/76      Physical Exam  Constitutional:       General: She is not in acute distress. Appearance: Normal appearance. She is normal weight. She is not diaphoretic. HENT:      Head: Normocephalic and atraumatic. Eyes:      Extraocular Movements: Extraocular movements intact. Conjunctiva/sclera: Conjunctivae normal.      Pupils: Pupils are equal, round, and reactive to light. Cardiovascular:      Rate and Rhythm: Normal rate and regular rhythm. Pulses: Normal pulses. Heart sounds: Normal heart sounds. No murmur heard. No friction rub. Pulmonary:      Effort: Pulmonary effort is normal. No respiratory distress. Breath sounds: Normal breath sounds. No wheezing or rhonchi. Chest:      Chest wall: No tenderness. Abdominal:      General: Abdomen is flat. Bowel sounds are normal. There is no distension. Palpations: Abdomen is soft. Tenderness: There is no abdominal tenderness. Musculoskeletal:         General: No swelling, tenderness or signs of injury (no contusion overlying the left calf). Normal range of motion. Right lower leg: No edema. Left lower leg: No edema. Neurological:      Mental Status: She is alert. Assessment/Plan:  1. Bilateral carotid artery occlusion  - 207% LIU and 11% LICA occlusion on Carotid US (11/12/21)  - Status post Left Carotid Angio and Left ICA stent placement with distal embolic protection (DOS 13/28/40).   - Post-discharge course uneventful, patient doing well. - ND DISCHARGE MEDS RECONCILED W/ CURRENT OUTPATIENT MED LIST- remains on DAPT and statin therapy. - Cardiology f/u appointment scheduled next week with Dr. Vasu Weldon.    2. Remote STEMI s/p RCI (8/25/21)  - Ongoing DAPT and Statin therapy as above. - Following with Cardiology    3. Hypertension  - Controlled. - On Carvedilol 3.125mg bid. 4. Leg pain, left  - Consistent with muscle sprain  - ibuprofen (ADVIL;MOTRIN) 600 MG tablet; Take 1 tablet by mouth 4 times daily as needed for Pain  Dispense: 40 tablet; Refill: 0  - Supportive treatment with warm compresses  - Monitor for progression.         Medical Decision Making: moderate complexity

## 2021-11-22 ENCOUNTER — HOSPITAL ENCOUNTER (EMERGENCY)
Age: 51
Discharge: HOME | End: 2021-11-23
Attending: STUDENT IN AN ORGANIZED HEALTH CARE EDUCATION/TRAINING PROGRAM
Payer: COMMERCIAL

## 2021-11-22 ENCOUNTER — APPOINTMENT (OUTPATIENT)
Dept: CT IMAGING | Age: 51
End: 2021-11-22
Payer: COMMERCIAL

## 2021-11-22 DIAGNOSIS — I65.21 RIGHT CAROTID ARTERY OCCLUSION: ICD-10-CM

## 2021-11-22 DIAGNOSIS — R51.9 ACUTE NONINTRACTABLE HEADACHE, UNSPECIFIED HEADACHE TYPE: Primary | ICD-10-CM

## 2021-11-22 DIAGNOSIS — E87.1 HYPONATREMIA: ICD-10-CM

## 2021-11-22 LAB
ALBUMIN SERPL-MCNC: 4.7 G/DL (ref 3.5–4.6)
ALP BLD-CCNC: 110 U/L (ref 40–130)
ALT SERPL-CCNC: 41 U/L (ref 0–33)
ANION GAP SERPL CALCULATED.3IONS-SCNC: 12 MEQ/L (ref 9–15)
APTT: 27.8 SEC (ref 24.4–36.8)
AST SERPL-CCNC: 17 U/L (ref 0–35)
BASOPHILS ABSOLUTE: 0.1 K/UL (ref 0–0.2)
BASOPHILS RELATIVE PERCENT: 0.9 %
BILIRUB SERPL-MCNC: <0.2 MG/DL (ref 0.2–0.7)
BUN BLDV-MCNC: 14 MG/DL (ref 6–20)
CALCIUM SERPL-MCNC: 10.1 MG/DL (ref 8.5–9.9)
CHLORIDE BLD-SCNC: 92 MEQ/L (ref 95–107)
CO2: 26 MEQ/L (ref 20–31)
CREAT SERPL-MCNC: 0.71 MG/DL (ref 0.5–0.9)
EOSINOPHILS ABSOLUTE: 0.2 K/UL (ref 0–0.7)
EOSINOPHILS RELATIVE PERCENT: 3.5 %
GFR AFRICAN AMERICAN: >60
GFR NON-AFRICAN AMERICAN: >60
GLOBULIN: 3.7 G/DL (ref 2.3–3.5)
GLUCOSE BLD-MCNC: 94 MG/DL (ref 70–99)
HCT VFR BLD CALC: 42.3 % (ref 37–47)
HEMOGLOBIN: 14 G/DL (ref 12–16)
INR BLD: 1
LACTIC ACID: 1.8 MMOL/L (ref 0.5–2.2)
LYMPHOCYTES ABSOLUTE: 1.9 K/UL (ref 1–4.8)
LYMPHOCYTES RELATIVE PERCENT: 27.6 %
MCH RBC QN AUTO: 26.8 PG (ref 27–31.3)
MCHC RBC AUTO-ENTMCNC: 33.2 % (ref 33–37)
MCV RBC AUTO: 80.7 FL (ref 82–100)
MONOCYTES ABSOLUTE: 0.5 K/UL (ref 0.2–0.8)
MONOCYTES RELATIVE PERCENT: 6.6 %
NEUTROPHILS ABSOLUTE: 4.3 K/UL (ref 1.4–6.5)
NEUTROPHILS RELATIVE PERCENT: 61.4 %
PDW BLD-RTO: 15 % (ref 11.5–14.5)
PLATELET # BLD: 342 K/UL (ref 130–400)
POTASSIUM SERPL-SCNC: 4 MEQ/L (ref 3.4–4.9)
PROTHROMBIN TIME: 13.5 SEC (ref 12.3–14.9)
RBC # BLD: 5.24 M/UL (ref 4.2–5.4)
SODIUM BLD-SCNC: 130 MEQ/L (ref 135–144)
TOTAL PROTEIN: 8.4 G/DL (ref 6.3–8)
WBC # BLD: 7.1 K/UL (ref 4.8–10.8)

## 2021-11-22 PROCEDURE — 6360000004 HC RX CONTRAST MEDICATION: Performed by: STUDENT IN AN ORGANIZED HEALTH CARE EDUCATION/TRAINING PROGRAM

## 2021-11-22 PROCEDURE — 82550 ASSAY OF CK (CPK): CPT

## 2021-11-22 PROCEDURE — 99284 EMERGENCY DEPT VISIT MOD MDM: CPT

## 2021-11-22 PROCEDURE — 70450 CT HEAD/BRAIN W/O DYE: CPT

## 2021-11-22 PROCEDURE — 84484 ASSAY OF TROPONIN QUANT: CPT

## 2021-11-22 PROCEDURE — 81003 URINALYSIS AUTO W/O SCOPE: CPT

## 2021-11-22 PROCEDURE — 85730 THROMBOPLASTIN TIME PARTIAL: CPT

## 2021-11-22 PROCEDURE — 70498 CT ANGIOGRAPHY NECK: CPT

## 2021-11-22 PROCEDURE — 83605 ASSAY OF LACTIC ACID: CPT

## 2021-11-22 PROCEDURE — 93005 ELECTROCARDIOGRAM TRACING: CPT | Performed by: STUDENT IN AN ORGANIZED HEALTH CARE EDUCATION/TRAINING PROGRAM

## 2021-11-22 PROCEDURE — 6360000002 HC RX W HCPCS: Performed by: STUDENT IN AN ORGANIZED HEALTH CARE EDUCATION/TRAINING PROGRAM

## 2021-11-22 PROCEDURE — 85025 COMPLETE CBC W/AUTO DIFF WBC: CPT

## 2021-11-22 PROCEDURE — 85610 PROTHROMBIN TIME: CPT

## 2021-11-22 PROCEDURE — 96365 THER/PROPH/DIAG IV INF INIT: CPT

## 2021-11-22 PROCEDURE — 80053 COMPREHEN METABOLIC PANEL: CPT

## 2021-11-22 PROCEDURE — 83735 ASSAY OF MAGNESIUM: CPT

## 2021-11-22 PROCEDURE — 6370000000 HC RX 637 (ALT 250 FOR IP): Performed by: STUDENT IN AN ORGANIZED HEALTH CARE EDUCATION/TRAINING PROGRAM

## 2021-11-22 PROCEDURE — 70496 CT ANGIOGRAPHY HEAD: CPT

## 2021-11-22 PROCEDURE — 96375 TX/PRO/DX INJ NEW DRUG ADDON: CPT

## 2021-11-22 PROCEDURE — 36415 COLL VENOUS BLD VENIPUNCTURE: CPT

## 2021-11-22 RX ORDER — ACETAMINOPHEN 500 MG
1000 TABLET ORAL ONCE
Status: COMPLETED | OUTPATIENT
Start: 2021-11-22 | End: 2021-11-22

## 2021-11-22 RX ORDER — MAGNESIUM SULFATE IN WATER 40 MG/ML
2000 INJECTION, SOLUTION INTRAVENOUS ONCE
Status: COMPLETED | OUTPATIENT
Start: 2021-11-22 | End: 2021-11-22

## 2021-11-22 RX ORDER — PROCHLORPERAZINE EDISYLATE 5 MG/ML
10 INJECTION INTRAMUSCULAR; INTRAVENOUS ONCE
Status: COMPLETED | OUTPATIENT
Start: 2021-11-22 | End: 2021-11-22

## 2021-11-22 RX ORDER — DIPHENHYDRAMINE HYDROCHLORIDE 50 MG/ML
25 INJECTION INTRAMUSCULAR; INTRAVENOUS ONCE
Status: COMPLETED | OUTPATIENT
Start: 2021-11-22 | End: 2021-11-22

## 2021-11-22 RX ADMIN — ACETAMINOPHEN 1000 MG: 500 TABLET ORAL at 22:18

## 2021-11-22 RX ADMIN — PROCHLORPERAZINE EDISYLATE 10 MG: 5 INJECTION INTRAMUSCULAR; INTRAVENOUS at 22:18

## 2021-11-22 RX ADMIN — MAGNESIUM SULFATE HEPTAHYDRATE 2000 MG: 2 INJECTION, SOLUTION INTRAVENOUS at 22:18

## 2021-11-22 RX ADMIN — IOPAMIDOL 100 ML: 755 INJECTION, SOLUTION INTRAVENOUS at 23:53

## 2021-11-22 RX ADMIN — DIPHENHYDRAMINE HYDROCHLORIDE 25 MG: 50 INJECTION, SOLUTION INTRAMUSCULAR; INTRAVENOUS at 22:17

## 2021-11-22 ASSESSMENT — PAIN DESCRIPTION - FREQUENCY: FREQUENCY: CONTINUOUS

## 2021-11-22 ASSESSMENT — ENCOUNTER SYMPTOMS
NAUSEA: 0
BACK PAIN: 0
SHORTNESS OF BREATH: 0
EYE PAIN: 0
RHINORRHEA: 0
SORE THROAT: 0
DIARRHEA: 0
ABDOMINAL PAIN: 0
VOMITING: 0
COUGH: 0

## 2021-11-22 ASSESSMENT — PAIN SCALES - GENERAL
PAINLEVEL_OUTOF10: 7
PAINLEVEL_OUTOF10: 7

## 2021-11-22 ASSESSMENT — PAIN DESCRIPTION - PAIN TYPE: TYPE: ACUTE PAIN

## 2021-11-22 ASSESSMENT — PAIN DESCRIPTION - LOCATION: LOCATION: HEAD

## 2021-11-22 ASSESSMENT — PAIN DESCRIPTION - DESCRIPTORS: DESCRIPTORS: HEADACHE

## 2021-11-23 VITALS
OXYGEN SATURATION: 100 % | SYSTOLIC BLOOD PRESSURE: 122 MMHG | DIASTOLIC BLOOD PRESSURE: 78 MMHG | TEMPERATURE: 98.5 F | HEART RATE: 74 BPM | RESPIRATION RATE: 18 BRPM

## 2021-11-23 LAB
ANION GAP SERPL CALCULATED.3IONS-SCNC: 9 MEQ/L (ref 9–15)
BILIRUBIN URINE: NEGATIVE
BLOOD, URINE: NEGATIVE
BUN BLDV-MCNC: 12 MG/DL (ref 6–20)
CALCIUM SERPL-MCNC: 9.3 MG/DL (ref 8.5–9.9)
CHLORIDE BLD-SCNC: 93 MEQ/L (ref 95–107)
CLARITY: CLEAR
CO2: 25 MEQ/L (ref 20–31)
COLOR: YELLOW
CREAT SERPL-MCNC: 0.73 MG/DL (ref 0.5–0.9)
EKG ATRIAL RATE: 60 BPM
EKG P AXIS: 46 DEGREES
EKG P-R INTERVAL: 148 MS
EKG Q-T INTERVAL: 412 MS
EKG QRS DURATION: 76 MS
EKG QTC CALCULATION (BAZETT): 412 MS
EKG R AXIS: 4 DEGREES
EKG T AXIS: 25 DEGREES
EKG VENTRICULAR RATE: 60 BPM
GFR AFRICAN AMERICAN: >60
GFR NON-AFRICAN AMERICAN: >60
GLUCOSE BLD-MCNC: 98 MG/DL (ref 70–99)
GLUCOSE URINE: NEGATIVE MG/DL
KETONES, URINE: NEGATIVE MG/DL
LEUKOCYTE ESTERASE, URINE: NEGATIVE
MAGNESIUM: 2.9 MG/DL (ref 1.7–2.4)
NITRITE, URINE: NEGATIVE
PH UA: 6.5 (ref 5–9)
POTASSIUM SERPL-SCNC: 4.2 MEQ/L (ref 3.4–4.9)
PROTEIN UA: NEGATIVE MG/DL
SODIUM BLD-SCNC: 127 MEQ/L (ref 135–144)
SPECIFIC GRAVITY UA: 1 (ref 1–1.03)
TOTAL CK: 64 U/L (ref 0–170)
TROPONIN: <0.01 NG/ML (ref 0–0.01)
URINE REFLEX TO CULTURE: NORMAL
UROBILINOGEN, URINE: 0.2 E.U./DL

## 2021-11-23 PROCEDURE — 93010 ELECTROCARDIOGRAM REPORT: CPT | Performed by: INTERNAL MEDICINE

## 2021-11-23 RX ORDER — ACETAMINOPHEN 500 MG
1000 TABLET ORAL EVERY 6 HOURS PRN
Qty: 60 TABLET | Refills: 0 | Status: SHIPPED | OUTPATIENT
Start: 2021-11-23

## 2021-11-23 NOTE — ED PROVIDER NOTES
for rhinorrhea and sore throat. Eyes: Negative for pain and visual disturbance. Respiratory: Negative for cough and shortness of breath. Cardiovascular: Negative for chest pain and palpitations. Gastrointestinal: Negative for abdominal pain, diarrhea, nausea and vomiting. Genitourinary: Negative for difficulty urinating and dysuria. Musculoskeletal: Negative for back pain and neck pain. Skin: Negative for rash. Neurological: Positive for dizziness, light-headedness and headaches. Negative for weakness and numbness.        PAST MEDICAL HISTORY     Past Medical History:   Diagnosis Date    Abnormal Pap smear of cervix     CAD (coronary artery disease)     Diverticular disease     Diverticulitis        SURGICAL HISTORY       Past Surgical History:   Procedure Laterality Date    DILATION AND CURETTAGE         FAMILY HISTORY       Family History   Problem Relation Age of Onset    No Known Problems Father     No Known Problems Mother     No Known Problems Paternal Grandfather     No Known Problems Paternal Grandmother     No Known Problems Maternal Grandmother     No Known Problems Maternal Grandfather     No Known Problems Brother     No Known Problems Sister     No Known Problems Other     Breast Cancer Neg Hx     Cancer Neg Hx     Colon Cancer Neg Hx     Diabetes Neg Hx     Eclampsia Neg Hx     Hypertension Neg Hx     Ovarian Cancer Neg Hx      Labor Neg Hx     Spont Abortions Neg Hx     Stroke Neg Hx        SOCIAL HISTORY       Social History     Socioeconomic History    Marital status: Single     Spouse name: None    Number of children: None    Years of education: None    Highest education level: None   Occupational History    None   Tobacco Use    Smoking status: Former Smoker     Packs/day: 0.25     Quit date: 10/25/2021     Years since quittin.0    Smokeless tobacco: Never Used   Substance and Sexual Activity    Alcohol use: Yes     Comment: socially    Drug use: No    Sexual activity: Not Currently   Other Topics Concern    None   Social History Narrative    None     Social Determinants of Health     Financial Resource Strain: Low Risk     Difficulty of Paying Living Expenses: Not very hard   Food Insecurity: No Food Insecurity    Worried About Running Out of Food in the Last Year: Never true    Geni of Food in the Last Year: Never true   Transportation Needs: No Transportation Needs    Lack of Transportation (Medical): No    Lack of Transportation (Non-Medical):  No   Physical Activity:     Days of Exercise per Week: Not on file    Minutes of Exercise per Session: Not on file   Stress:     Feeling of Stress : Not on file   Social Connections:     Frequency of Communication with Friends and Family: Not on file    Frequency of Social Gatherings with Friends and Family: Not on file    Attends Jewish Services: Not on file    Active Member of [address] [place] [address] or Organizations: Not on file    Attends Club or Organization Meetings: Not on file    Marital Status: Not on file   Intimate Partner Violence:     Fear of Current or Ex-Partner: Not on file    Emotionally Abused: Not on file    Physically Abused: Not on file    Sexually Abused: Not on file   Housing Stability:     Unable to Pay for Housing in the Last Year: Not on file    Number of [place] in the Last Year: Not on file    Unstable Housing in the Last Year: Not on file       CURRENT MEDICATIONS       Discharge Medication List as of 11/23/2021  1:21 AM      CONTINUE these medications which have NOT CHANGED    Details   ibuprofen (ADVIL;MOTRIN) 600 MG tablet Take 1 tablet by mouth 4 times daily as needed for Pain, Disp-40 tablet, R-0Normal      ascorbic acid (VITAMIN C) 500 MG tablet Take 500 mg by mouth dailyHistorical Med      b complex vitamins capsule Take 1 capsule by mouth dailyHistorical Med      atorvastatin (LIPITOR) 80 MG tablet Take 0.5 tablets by mouth nightly, Disp-30 tablet, R-3Normal      carvedilol (COREG) 3.125 MG tablet TAKE 1 TABLET TWICE A DAYHistorical Med      aspirin 81 MG chewable tablet Take 1 tablet by mouth daily, Disp-30 tablet, R-3Normal      nitroGLYCERIN (NITROSTAT) 0.4 MG SL tablet up to max of 3 total doses. If no relief after 1 dose, call 911., Disp-25 tablet, R-3Normal      clopidogrel (PLAVIX) 75 MG tablet Take 1 tablet by mouth daily, Disp-30 tablet, R-3Normal             ALLERGIES     Sulfa antibiotics      PHYSICAL EXAM       ED Triage Vitals   BP Temp Temp src Pulse Resp SpO2 Height Weight   -- -- -- -- -- -- -- --       Physical Exam  Vitals and nursing note reviewed. Constitutional:       General: She is not in acute distress. HENT:      Head: Normocephalic and atraumatic. Mouth/Throat:      Mouth: Mucous membranes are moist.      Pharynx: Oropharynx is clear. Eyes:      Extraocular Movements: Extraocular movements intact. Pupils: Pupils are equal, round, and reactive to light. Cardiovascular:      Rate and Rhythm: Normal rate and regular rhythm. Pulses: Normal pulses. Heart sounds: Normal heart sounds. Pulmonary:      Effort: Pulmonary effort is normal. No respiratory distress. Breath sounds: Normal breath sounds. Abdominal:      General: There is no distension. Palpations: Abdomen is soft. Tenderness: There is no abdominal tenderness. There is no guarding or rebound. Musculoskeletal:         General: No tenderness. Cervical back: Normal range of motion and neck supple. Right lower leg: No edema. Left lower leg: No edema. Skin:     General: Skin is warm and dry. Capillary Refill: Capillary refill takes less than 2 seconds. Findings: Bruising and ecchymosis present. Neurological:      General: No focal deficit present. Mental Status: She is alert and oriented to person, place, and time. Cranial Nerves: No cranial nerve deficit. Sensory: No sensory deficit. Motor: No weakness. Gait: Gait normal.   Psychiatric:         Mood and Affect: Mood normal.         Behavior: Behavior normal.         MDM:   Chart Reviewed: PMH and additional information as noted in HPI obtained from chart review    Vitals:    Vitals:    11/23/21 0130   BP: 122/78   Pulse: 74   Resp: 18   Temp: 98.5 °F (36.9 °C)   TempSrc: Oral   SpO2: 100%       PROCEDURES:  Unless otherwise noted below, none  Procedures    LABS:  Labs Reviewed   CBC WITH AUTO DIFFERENTIAL - Abnormal; Notable for the following components:       Result Value    MCV 80.7 (*)     MCH 26.8 (*)     RDW 15.0 (*)     All other components within normal limits   COMPREHENSIVE METABOLIC PANEL - Abnormal; Notable for the following components:    Sodium 130 (*)     Chloride 92 (*)     Calcium 10.1 (*)     Total Protein 8.4 (*)     Albumin 4.7 (*)     ALT 41 (*)     Globulin 3.7 (*)     All other components within normal limits   BASIC METABOLIC PANEL - Abnormal; Notable for the following components:    Sodium 127 (*)     Chloride 93 (*)     All other components within normal limits   MAGNESIUM - Abnormal; Notable for the following components:    Magnesium 2.9 (*)     All other components within normal limits   URINE RT REFLEX TO CULTURE   LACTIC ACID, PLASMA   PROTIME-INR   APTT   TROPONIN   CK   MAGNESIUM       CT HEAD WO CONTRAST    (Results Pending)   CTA Neck W WO Contrast    (Results Pending)   CTA Head W WO Contrast    (Results Pending)       ED Course as of 11/23/21 0238   Tue Nov 23, 2021   0112 Sodium(!): 130  Mild hyponatremia with associated hypochloremia. Nonspecific.  [NA]   0113 CBC Auto Differential(!):    WBC 7.1   RBC 5.24   Hemoglobin Quant 14.0   Hematocrit 42.3   MCV 80.7(!)   MCH 26.8(!)   MCHC 33.2   RDW 15.0(!)   Platelet Count 944   Neutrophils % 61.4   Lymphocyte % 27.6   Monocytes % 6.6   Eosinophils % 3.5   Basophils % 0.9   Neutrophils Absolute 4.3   Lymphocytes Absolute 1.9   Monocytes Absolute 0.5 Eosinophils Absolute 0.2   Basophils Absolute 0.1  Unremarkable [NA]   0113 Urine Reflex to Culture:    Color, UA Yellow   Clarity, UA Clear   Glucose, UA Negative   Bilirubin, Urine Negative   Ketones, Urine Negative   Specific Gravity, UA 1.004   Blood, Urine Negative   pH, UA 6.5   Protein, UA Negative   Urobilinogen, Urine 0.2   Nitrite, Urine Negative   Leukocyte Esterase, Urine Negative   Urine Reflex to Culture Not Indicated  Evidence of UTI [NA]   0113 Total CK: 64 [NA]   0113 Magnesium(!): 2.9  Elevated, possibly secondary to magnesium already being infused. [NA]   0113 Lactic Acid: 1.8 [NA]   0113 aPTT: 27.8 [NA]   0113 INR: 1.0  Coags WNL [NA]   0121 CTA Neck W WO Contrast  CTA showing occlusion of the right internal carotid artery. Stenting of the distal left common carotid artery into the proximal left internal carotid artery with patent stent. Normal bilateral vertebral arteries. No evidence of acute intracranial hemorrhage or space-occupying lesion. [NA]      ED Course User Index  [NA] Paulina Gillis MD       46 y.o. female with a PMH clinically significant for diverticulosis, fibroids, PAD, CAD and tobacco smoking presenting to the ED via self c/o headache over the past 2 days associated with lightheadedness, dizziness and high blood pressure that she noted during the episodes. Upon initial evaluation, Pt Afebrile, HDS and in NAD. PE as noted above. Labs, , EKG, and Imaging as noted above. Given findings, clinical presentation most likely consistent w/ non-intractable headache without evidence of ICH, new LVO or stent thrombosis involving the recently placed left carotid artery stent. Very low suspicion for meningitis/encephalitis. No TTP noted over the temporal regions, very low suspicion for temporal arteritis. Symptoms significantly improved following meds in the ED. Given findings, stable for further evaluation management as an outpatient.   Did note hyponatremia of unclear etiology. Patient states she was drinking more fluids recently. Is following up with her PCP in 2 days and was instructed to keep this follow-up and inform her PCP of this new hyponatremia so it can be reevaluated. Patient was understanding amenable to the POC. Pt was administered   Medications   magnesium sulfate 2000 mg in 50 mL IVPB premix (0 mg IntraVENous Stopped 11/22/21 2303)   acetaminophen (TYLENOL) tablet 1,000 mg (1,000 mg Oral Given 11/22/21 2218)   diphenhydrAMINE (BENADRYL) injection 25 mg (25 mg IntraVENous Given 11/22/21 2217)   prochlorperazine (COMPAZINE) injection 10 mg (10 mg IntraVENous Given 11/22/21 2218)   iopamidol (ISOVUE-370) 76 % injection 100 mL (100 mLs IntraVENous Given 11/22/21 2353)       Plan: Patient understanding and amenable to the POC. CRITICAL CARE TIME   Total CriticalCare time was 0 minutes, excluding separately reportable procedures. There was a high probability of clinically significant/life threatening deterioration in the patient's condition which required my urgent intervention. FINAL IMPRESSION      1. Acute nonintractable headache, unspecified headache type    2. Right carotid artery occlusion    3.  Hyponatremia          DISPOSITION/PLAN   DISPOSITION Decision To Discharge 11/23/2021 01:19:34 AM      Discharge Medication List as of 11/23/2021  1:21 AM      START taking these medications    Details   acetaminophen (TYLENOL) 500 MG tablet Take 2 tablets by mouth every 6 hours as needed for Pain or Fever, Disp-60 tablet, R-0Normal              MD Marcia Anderson MD  11/23/21 0760

## 2021-11-23 NOTE — ED TRIAGE NOTES
Pt states that she had carotid surgery on Monday. Pt d/c Wednesday from here. Pt states she stood up tonight to go to the kitchen, suddenly became dizzy with headache. Pt states that she took her b/p and got high reading. Pt states that Saturday she walked up her stairs and felt throbbing behind her left eye. Pt states that her headache is decreasing now and her b/p is lowering.

## 2021-11-24 ENCOUNTER — OFFICE VISIT (OUTPATIENT)
Dept: CARDIOLOGY CLINIC | Age: 51
End: 2021-11-24
Payer: COMMERCIAL

## 2021-11-24 VITALS
HEART RATE: 67 BPM | WEIGHT: 165 LBS | HEIGHT: 64 IN | DIASTOLIC BLOOD PRESSURE: 82 MMHG | BODY MASS INDEX: 28.17 KG/M2 | OXYGEN SATURATION: 98 % | RESPIRATION RATE: 16 BRPM | SYSTOLIC BLOOD PRESSURE: 124 MMHG

## 2021-11-24 DIAGNOSIS — Z98.890 HISTORY OF LEFT COMMON CAROTID ARTERY STENT PLACEMENT: ICD-10-CM

## 2021-11-24 DIAGNOSIS — E78.5 HYPERLIPIDEMIA, UNSPECIFIED HYPERLIPIDEMIA TYPE: ICD-10-CM

## 2021-11-24 DIAGNOSIS — I10 ESSENTIAL HYPERTENSION: Primary | ICD-10-CM

## 2021-11-24 DIAGNOSIS — I25.10 CORONARY ARTERY DISEASE INVOLVING NATIVE CORONARY ARTERY OF NATIVE HEART WITHOUT ANGINA PECTORIS: ICD-10-CM

## 2021-11-24 DIAGNOSIS — Z95.828 HISTORY OF LEFT COMMON CAROTID ARTERY STENT PLACEMENT: ICD-10-CM

## 2021-11-24 PROCEDURE — 3017F COLORECTAL CA SCREEN DOC REV: CPT | Performed by: INTERNAL MEDICINE

## 2021-11-24 PROCEDURE — G8427 DOCREV CUR MEDS BY ELIG CLIN: HCPCS | Performed by: INTERNAL MEDICINE

## 2021-11-24 PROCEDURE — 1036F TOBACCO NON-USER: CPT | Performed by: INTERNAL MEDICINE

## 2021-11-24 PROCEDURE — G8484 FLU IMMUNIZE NO ADMIN: HCPCS | Performed by: INTERNAL MEDICINE

## 2021-11-24 PROCEDURE — 99214 OFFICE O/P EST MOD 30 MIN: CPT | Performed by: INTERNAL MEDICINE

## 2021-11-24 PROCEDURE — 1111F DSCHRG MED/CURRENT MED MERGE: CPT | Performed by: INTERNAL MEDICINE

## 2021-11-24 PROCEDURE — G8419 CALC BMI OUT NRM PARAM NOF/U: HCPCS | Performed by: INTERNAL MEDICINE

## 2021-11-24 ASSESSMENT — ENCOUNTER SYMPTOMS
WHEEZING: 0
BLOOD IN STOOL: 0
NAUSEA: 0
CHEST TIGHTNESS: 0
EYES NEGATIVE: 1
STRIDOR: 0
SHORTNESS OF BREATH: 0
COUGH: 0
GASTROINTESTINAL NEGATIVE: 1
RESPIRATORY NEGATIVE: 1

## 2021-11-24 NOTE — PROGRESS NOTES
OFFICE VISIT        Patient: Twila Layton  YOB: 1970  MRN: [id number]    Chief Complaint: STEMI HTN HPL   Chief Complaint   Patient presents with    Follow-Up from Hospital     admitted  to      Other     carotid occlusion     Hypertension     blood rushes when over exerted, when going up stairs and cause a headache        CV Data:  10/21 LE Art - negative    Echo EF 55-60 Trace MR  21 STEMI RCA Px ABELARDO. CX distal 70-75%   98/94 CUS % LICA >56   66/81/75 Left Carotid stent. %    Subjective/HPI Dr. Isadora Trujillo pt changing due to insurance. Pt has had rare pressure when working as a . These symptoms are different than her MI symptoms. Compliant with meds. Pt was taken off statin for unknown reason. 21 asymptomatic but has severe carotid dz. LICA >89% and LIU occluded. occ chest heaviness. No sob. / had LICA Stent did well. Recent HTN and went to ER. Has been stable. No more headaches. No cp some sob but no worse. Work -   Former smoker- quit 2021. occ etoh  ++FH  Lives with sister.         EKG:    Past Medical History:   Diagnosis Date    Abnormal Pap smear of cervix     CAD (coronary artery disease)     Diverticular disease     Diverticulitis        Past Surgical History:   Procedure Laterality Date    DILATION AND CURETTAGE         Family History   Problem Relation Age of Onset    No Known Problems Father     No Known Problems Mother     No Known Problems Paternal Grandfather     No Known Problems Paternal Grandmother     No Known Problems Maternal Grandmother     No Known Problems Maternal Grandfather     No Known Problems Brother     No Known Problems Sister     No Known Problems Other     Breast Cancer Neg Hx     Cancer Neg Hx     Colon Cancer Neg Hx     Diabetes Neg Hx     Eclampsia Neg Hx     Hypertension Neg Hx     Ovarian Cancer Neg Hx      Labor Neg Hx     Spont Abortions Neg Hx  Stroke Neg Hx        Social History     Socioeconomic History    Marital status: Single     Spouse name: None    Number of children: None    Years of education: None    Highest education level: None   Occupational History    None   Tobacco Use    Smoking status: Former Smoker     Packs/day: 0.25     Quit date: 10/25/2021     Years since quittin.0    Smokeless tobacco: Never Used   Substance and Sexual Activity    Alcohol use: Yes     Comment: socially    Drug use: No    Sexual activity: Not Currently   Other Topics Concern    None   Social History Narrative    None     Social Determinants of Health     Financial Resource Strain: Low Risk     Difficulty of Paying Living Expenses: Not very hard   Food Insecurity: No Food Insecurity    Worried About Running Out of Food in the Last Year: Never true    Geni of Food in the Last Year: Never true   Transportation Needs: No Transportation Needs    Lack of Transportation (Medical): No    Lack of Transportation (Non-Medical):  No   Physical Activity:     Days of Exercise per Week: Not on file    Minutes of Exercise per Session: Not on file   Stress:     Feeling of Stress : Not on file   Social Connections:     Frequency of Communication with Friends and Family: Not on file    Frequency of Social Gatherings with Friends and Family: Not on file    Attends Pent[employer]ostal[employer] Services: Not on file    Active Member of Clubs or Organizations: Not on file    Attends Club or Organization Meetings: Not on file    Marital Status: Not on file   Intimate Partner Violence:     Fear of Current or Ex-Partner: Not on file    Emotionally Abused: Not on file    Physically Abused: Not on file    Sexually Abused: Not on file   Housing Stability:     Unable to Pay for Housing in the Last Year: Not on file    Number of [place] in the Last Year: Not on file    Unstable Housing in the Last Year: Not on file       Allergies   Allergen Reactions    Sulfa Antibiotics Rash       Current Outpatient Medications   Medication Sig Dispense Refill    acetaminophen (TYLENOL) 500 MG tablet Take 2 tablets by mouth every 6 hours as needed for Pain or Fever 60 tablet 0    ibuprofen (ADVIL;MOTRIN) 600 MG tablet Take 1 tablet by mouth 4 times daily as needed for Pain 40 tablet 0    ascorbic acid (VITAMIN C) 500 MG tablet Take 500 mg by mouth daily      b complex vitamins capsule Take 1 capsule by mouth daily      atorvastatin (LIPITOR) 80 MG tablet Take 0.5 tablets by mouth nightly 30 tablet 3    carvedilol (COREG) 3.125 MG tablet TAKE 1 TABLET TWICE A DAY      aspirin 81 MG chewable tablet Take 1 tablet by mouth daily 30 tablet 3    nitroGLYCERIN (NITROSTAT) 0.4 MG SL tablet up to max of 3 total doses. If no relief after 1 dose, call 911. 25 tablet 3    clopidogrel (PLAVIX) 75 MG tablet Take 1 tablet by mouth daily 30 tablet 3     No current facility-administered medications for this visit. Review of Systems:   Review of Systems   Constitutional: Negative. Negative for diaphoresis and fatigue. HENT: Negative. Eyes: Negative. Respiratory: Negative. Negative for cough, chest tightness, shortness of breath, wheezing and stridor. Cardiovascular: Negative. Negative for chest pain, palpitations and leg swelling. Gastrointestinal: Negative. Negative for blood in stool and nausea. Genitourinary: Negative. Musculoskeletal: Negative. Skin: Negative. Neurological: Negative. Negative for dizziness, syncope, weakness and light-headedness. Hematological: Negative. Psychiatric/Behavioral: Negative. Physical Examination:    /82 (Site: Right Upper Arm, Position: Sitting, Cuff Size: Small Adult)   Pulse 67   Resp 16   Ht 5' 4\" (1.626 m)   Wt 165 lb (74.8 kg)   SpO2 98%   BMI 28.32 kg/m²    Physical Exam   Constitutional: She appears healthy. No distress.    HENT:   Normal cephalic and Atraumatic   Eyes: Pupils are equal, round, and reactive to light. Neck: Thyroid normal. No JVD present. No neck adenopathy. No thyromegaly present. Cardiovascular: Normal rate, regular rhythm, normal heart sounds, intact distal pulses and normal pulses. Pulmonary/Chest: Effort normal and breath sounds normal. She has no wheezes. She has no rales. She exhibits no tenderness. Abdominal: Soft. Bowel sounds are normal. There is no abdominal tenderness. Musculoskeletal:         General: No tenderness or edema. Normal range of motion. Cervical back: Normal range of motion and neck supple. Neurological: She is alert and oriented to person, place, and time. Skin: Skin is warm. No cyanosis. Nails show no clubbing.        LABS:  CBC:   Lab Results   Component Value Date    WBC 7.1 11/22/2021    RBC 5.24 11/22/2021    HGB 14.0 11/22/2021    HCT 42.3 11/22/2021    MCV 80.7 11/22/2021    MCH 26.8 11/22/2021    MCHC 33.2 11/22/2021    RDW 15.0 11/22/2021     11/22/2021    MPV 10.1 04/08/2015     Lipids:  Lab Results   Component Value Date    CHOL 169 11/15/2021     Lab Results   Component Value Date    TRIG 197 (H) 11/15/2021     Lab Results   Component Value Date    HDL 43 11/15/2021     Lab Results   Component Value Date    LDLCALC 87 11/15/2021     No results found for: LABVLDL, VLDL  No results found for: CHOLHDLRATIO  CMP:    Lab Results   Component Value Date     11/22/2021     11/22/2021    K 4.0 11/22/2021    K 4.2 11/22/2021    CL 92 11/22/2021    CL 93 11/22/2021    CO2 26 11/22/2021    CO2 25 11/22/2021    BUN 14 11/22/2021    BUN 12 11/22/2021    CREATININE 0.71 11/22/2021    CREATININE 0.73 11/22/2021    GFRAA >60.0 11/22/2021    GFRAA >60.0 11/22/2021    LABGLOM >60.0 11/22/2021    LABGLOM >60.0 11/22/2021    GLUCOSE 94 11/22/2021    GLUCOSE 98 11/22/2021    PROT 8.4 11/22/2021    LABALBU 4.7 11/22/2021    CALCIUM 10.1 11/22/2021    CALCIUM 9.3 11/22/2021    BILITOT <0.2 11/22/2021    ALKPHOS 110 11/22/2021    AST 17 11/22/2021    ALT 41 11/22/2021     BMP:    Lab Results   Component Value Date     11/22/2021     11/22/2021    K 4.0 11/22/2021    K 4.2 11/22/2021    CL 92 11/22/2021    CL 93 11/22/2021    CO2 26 11/22/2021    CO2 25 11/22/2021    BUN 14 11/22/2021    BUN 12 11/22/2021    LABALBU 4.7 11/22/2021    CREATININE 0.71 11/22/2021    CREATININE 0.73 11/22/2021    CALCIUM 10.1 11/22/2021    CALCIUM 9.3 11/22/2021    GFRAA >60.0 11/22/2021    GFRAA >60.0 11/22/2021    LABGLOM >60.0 11/22/2021    LABGLOM >60.0 11/22/2021    GLUCOSE 94 11/22/2021    GLUCOSE 98 11/22/2021     Magnesium:    Lab Results   Component Value Date    MG 2.9 11/22/2021     TSH:  Lab Results   Component Value Date    TSH 3.230 02/05/2019             Patient Active Problem List   Diagnosis    Diverticulosis    Irregular periods    Uterine leiomyoma    Rash and nonspecific skin eruption    Acute gastritis    Generalized abdominal pain    DUB (dysfunctional uterine bleeding)    Tobacco abuse    Acute myocardial infarction ([address]    Carotid occlusion, bilateral       There are no discontinued medications. Modified Medications    No medications on file       No orders of the defined types were placed in this encounter. Assessment/Plan:    1. Coronary artery disease involving native coronary artery of native heart without angina pectoris  Stable no angina. continue meds    2. Essential hypertension  Stable. Continue meds. Low salt diet    3. Dyslipidemia  Resume Atorvastatin at lower dose of 40 qd. rechck labs. Low fat diet. - Lipid, Fasting; Future    4. ST elevation myocardial infarction involving right coronary artery ([address]  5. Left Carotid stent- stable. Counseling:  Heart Healthy Lifestyle, Improve BMI, Low Salt Diet, Take Precautions to Prevent Falls and Walk Daily    Return in about 1 month (around 12/24/2021).     Electronically signed by Vandana Bedoya MD on 11/24/2021 at 2:39 PM

## 2021-11-30 ENCOUNTER — OFFICE VISIT (OUTPATIENT)
Dept: CARDIOLOGY CLINIC | Age: 51
End: 2021-11-30
Payer: COMMERCIAL

## 2021-11-30 VITALS
RESPIRATION RATE: 16 BRPM | HEIGHT: 64 IN | SYSTOLIC BLOOD PRESSURE: 135 MMHG | BODY MASS INDEX: 27.83 KG/M2 | DIASTOLIC BLOOD PRESSURE: 84 MMHG | HEART RATE: 71 BPM | WEIGHT: 163 LBS

## 2021-11-30 DIAGNOSIS — E78.5 HYPERLIPIDEMIA, UNSPECIFIED HYPERLIPIDEMIA TYPE: ICD-10-CM

## 2021-11-30 DIAGNOSIS — I65.22 STENOSIS OF LEFT CAROTID ARTERY: ICD-10-CM

## 2021-11-30 DIAGNOSIS — I21.11 ST ELEVATION MYOCARDIAL INFARCTION INVOLVING RIGHT CORONARY ARTERY (HCC): ICD-10-CM

## 2021-11-30 DIAGNOSIS — I25.10 CORONARY ARTERY DISEASE INVOLVING NATIVE CORONARY ARTERY OF NATIVE HEART WITHOUT ANGINA PECTORIS: ICD-10-CM

## 2021-11-30 DIAGNOSIS — Z98.890 HISTORY OF LEFT COMMON CAROTID ARTERY STENT PLACEMENT: ICD-10-CM

## 2021-11-30 DIAGNOSIS — I10 ESSENTIAL HYPERTENSION: Primary | ICD-10-CM

## 2021-11-30 DIAGNOSIS — Z95.828 HISTORY OF LEFT COMMON CAROTID ARTERY STENT PLACEMENT: ICD-10-CM

## 2021-11-30 DIAGNOSIS — E78.5 DYSLIPIDEMIA: ICD-10-CM

## 2021-11-30 PROCEDURE — 3017F COLORECTAL CA SCREEN DOC REV: CPT | Performed by: INTERNAL MEDICINE

## 2021-11-30 PROCEDURE — G8419 CALC BMI OUT NRM PARAM NOF/U: HCPCS | Performed by: INTERNAL MEDICINE

## 2021-11-30 PROCEDURE — 99214 OFFICE O/P EST MOD 30 MIN: CPT | Performed by: INTERNAL MEDICINE

## 2021-11-30 PROCEDURE — G8427 DOCREV CUR MEDS BY ELIG CLIN: HCPCS | Performed by: INTERNAL MEDICINE

## 2021-11-30 PROCEDURE — 1111F DSCHRG MED/CURRENT MED MERGE: CPT | Performed by: INTERNAL MEDICINE

## 2021-11-30 PROCEDURE — G8484 FLU IMMUNIZE NO ADMIN: HCPCS | Performed by: INTERNAL MEDICINE

## 2021-11-30 PROCEDURE — 1036F TOBACCO NON-USER: CPT | Performed by: INTERNAL MEDICINE

## 2021-11-30 RX ORDER — LISINOPRIL AND HYDROCHLOROTHIAZIDE 25; 20 MG/1; MG/1
1 TABLET ORAL DAILY
Qty: 30 TABLET | Refills: 3 | Status: SHIPPED | OUTPATIENT
Start: 2021-11-30 | End: 2021-12-27 | Stop reason: SDUPTHER

## 2021-11-30 ASSESSMENT — ENCOUNTER SYMPTOMS
GASTROINTESTINAL NEGATIVE: 1
STRIDOR: 0
WHEEZING: 0
EYES NEGATIVE: 1
COUGH: 0
CHEST TIGHTNESS: 0
SHORTNESS OF BREATH: 0
RESPIRATORY NEGATIVE: 1
NAUSEA: 0
BLOOD IN STOOL: 0

## 2021-11-30 NOTE — PROGRESS NOTES
OFFICE VISIT        Patient: Kiki Del Castillo  YOB: 1970  MRN: [id number]    Chief Complaint: STEMI HTN HPL   Chief Complaint   Patient presents with    Other     SAW YOU WEDNESDAY, WANTED ANOTHER APPT DUE TO HTN AND MIGRAINES       CV Data:  10/21 LE Art - negative    Echo EF 55-60 Trace MR  21 STEMI RCA Px ABELARDO. CX distal 70-75%   74/85 CUS % LICA >52   93// Left Carotid stent. %    Subjective/HPI Dr. Jyoti Dawson pt changing due to insurance. Pt has had rare pressure when working as a . These symptoms are different than her MI symptoms. Compliant with meds. Pt was taken off statin for unknown reason. 21 asymptomatic but has severe carotid dz. LICA >05% and LIU occluded. occ chest heaviness. No sob.  had LICA Stent did well. Recent HTN and went to ER. Has been stable. No more headaches. No cp some sob but no worse. 21 called in due to uncontrolled BP worsea t night. No cp but occ heavy feeling. No sob. Little puffy under her eye. No bleed. Work -   Former smoker- quit 2021. occ etoh  ++FH  Lives with sister.         EKG:    Past Medical History:   Diagnosis Date    Abnormal Pap smear of cervix     CAD (coronary artery disease)     Diverticular disease     Diverticulitis        Past Surgical History:   Procedure Laterality Date    DILATION AND CURETTAGE         Family History   Problem Relation Age of Onset    No Known Problems Father     No Known Problems Mother     No Known Problems Paternal Grandfather     No Known Problems Paternal Grandmother     No Known Problems Maternal Grandmother     No Known Problems Maternal Grandfather     No Known Problems Brother     No Known Problems Sister     No Known Problems Other     Breast Cancer Neg Hx     Cancer Neg Hx     Colon Cancer Neg Hx     Diabetes Neg Hx     Eclampsia Neg Hx     Hypertension Neg Hx     Ovarian Cancer Neg Hx      Labor Neg Hx     Spont Abortions Neg Hx     Stroke Neg Hx        Social History     Socioeconomic History    Marital status: Single     Spouse name: None    Number of children: None    Years of education: None    Highest education level: None   Occupational History    None   Tobacco Use    Smoking status: Former Smoker     Packs/day: 0.25     Quit date: 10/25/2021     Years since quittin.0    Smokeless tobacco: Never Used   Substance and Sexual Activity    Alcohol use: Yes     Comment: socially    Drug use: No    Sexual activity: Not Currently   Other Topics Concern    None   Social History Narrative    None     Social Determinants of Health     Financial Resource Strain: Low Risk     Difficulty of Paying Living Expenses: Not very hard   Food Insecurity: No Food Insecurity    Worried About Running Out of Food in the Last Year: Never true    Geni of Food in the Last Year: Never true   Transportation Needs: No Transportation Needs    Lack of Transportation (Medical): No    Lack of Transportation (Non-Medical):  No   Physical Activity:     Days of Exercise per Week: Not on file    Minutes of Exercise per Session: Not on file   Stress:     Feeling of Stress : Not on file   Social Connections:     Frequency of Communication with Friends and Family: Not on file    Frequency of Social Gatherings with Friends and Family: Not on file    Attends Congregational Services: Not on file    Active Member of Clubs or Organizations: Not on file    Attends Club or Organization Meetings: Not on file    Marital Status: Not on file   Intimate Partner Violence:     Fear of Current or Ex-Partner: Not on file    Emotionally Abused: Not on file    Physically Abused: Not on file    Sexually Abused: Not on file   Housing Stability:     Unable to Pay for Housing in the Last Year: Not on file    Number of [place] in the Last Year: Not on file    Unstable Housing in the Last Year: Not on file       Allergies Allergen Reactions    Sulfa Antibiotics Rash       Current Outpatient Medications   Medication Sig Dispense Refill    lisinopril-hydroCHLOROthiazide (PRINZIDE;ZESTORETIC) 20-25 MG per tablet Take 1 tablet by mouth daily 30 tablet 3    acetaminophen (TYLENOL) 500 MG tablet Take 2 tablets by mouth every 6 hours as needed for Pain or Fever 60 tablet 0    ibuprofen (ADVIL;MOTRIN) 600 MG tablet Take 1 tablet by mouth 4 times daily as needed for Pain 40 tablet 0    ascorbic acid (VITAMIN C) 500 MG tablet Take 500 mg by mouth daily      b complex vitamins capsule Take 1 capsule by mouth daily      atorvastatin (LIPITOR) 80 MG tablet Take 0.5 tablets by mouth nightly 30 tablet 3    carvedilol (COREG) 3.125 MG tablet TAKE 1 TABLET TWICE A DAY      aspirin 81 MG chewable tablet Take 1 tablet by mouth daily 30 tablet 3    nitroGLYCERIN (NITROSTAT) 0.4 MG SL tablet up to max of 3 total doses. If no relief after 1 dose, call 911. 25 tablet 3    clopidogrel (PLAVIX) 75 MG tablet Take 1 tablet by mouth daily 30 tablet 3     No current facility-administered medications for this visit. Review of Systems:   Review of Systems   Constitutional: Negative. Negative for diaphoresis and fatigue. HENT: Negative. Eyes: Negative. Respiratory: Negative. Negative for cough, chest tightness, shortness of breath, wheezing and stridor. Cardiovascular: Negative. Negative for chest pain, palpitations and leg swelling. Gastrointestinal: Negative. Negative for blood in stool and nausea. Genitourinary: Negative. Musculoskeletal: Negative. Skin: Negative. Neurological: Negative. Negative for dizziness, syncope, weakness and light-headedness. Hematological: Negative. Psychiatric/Behavioral: Negative.           Physical Examination:    /84 (Site: Right Upper Arm, Position: Sitting, Cuff Size: Large Adult) Comment: JUST TOOK MEDS, MIGRAINE IS BETTER AT THE MOMENT  Pulse 71   Resp 16   Ht 5' 4\" (1.626 m)   Wt 163 lb (73.9 kg)   BMI 27.98 kg/m²    Physical Exam   Constitutional: She appears healthy. No distress. HENT:   Normal cephalic and Atraumatic   Eyes: Pupils are equal, round, and reactive to light. Neck: Thyroid normal. No JVD present. No neck adenopathy. No thyromegaly present. Cardiovascular: Normal rate, regular rhythm, normal heart sounds, intact distal pulses and normal pulses. Pulmonary/Chest: Effort normal and breath sounds normal. She has no wheezes. She has no rales. She exhibits no tenderness. Abdominal: Soft. Bowel sounds are normal. There is no abdominal tenderness. Musculoskeletal:         General: No tenderness or edema. Normal range of motion. Cervical back: Normal range of motion and neck supple. Neurological: She is alert and oriented to person, place, and time. Skin: Skin is warm. No cyanosis. Nails show no clubbing.        LABS:  CBC:   Lab Results   Component Value Date    WBC 7.1 11/22/2021    RBC 5.24 11/22/2021    HGB 14.0 11/22/2021    HCT 42.3 11/22/2021    MCV 80.7 11/22/2021    MCH 26.8 11/22/2021    MCHC 33.2 11/22/2021    RDW 15.0 11/22/2021     11/22/2021    MPV 10.1 04/08/2015     Lipids:  Lab Results   Component Value Date    CHOL 169 11/15/2021     Lab Results   Component Value Date    TRIG 197 (H) 11/15/2021     Lab Results   Component Value Date    HDL 43 11/15/2021     Lab Results   Component Value Date    LDLCALC 87 11/15/2021     No results found for: LABVLDL, VLDL  No results found for: CHOLHDLRATIO  CMP:    Lab Results   Component Value Date     11/22/2021     11/22/2021    K 4.0 11/22/2021    K 4.2 11/22/2021    CL 92 11/22/2021    CL 93 11/22/2021    CO2 26 11/22/2021    CO2 25 11/22/2021    BUN 14 11/22/2021    BUN 12 11/22/2021    CREATININE 0.71 11/22/2021    CREATININE 0.73 11/22/2021    GFRAA >60.0 11/22/2021    GFRAA >60.0 11/22/2021    LABGLOM >60.0 11/22/2021    LABGLOM >60.0 11/22/2021    GLUCOSE 94 11/22/2021 GLUCOSE 98 11/22/2021    PROT 8.4 11/22/2021    LABALBU 4.7 11/22/2021    CALCIUM 10.1 11/22/2021    CALCIUM 9.3 11/22/2021    BILITOT <0.2 11/22/2021    ALKPHOS 110 11/22/2021    AST 17 11/22/2021    ALT 41 11/22/2021     BMP:    Lab Results   Component Value Date     11/22/2021     11/22/2021    K 4.0 11/22/2021    K 4.2 11/22/2021    CL 92 11/22/2021    CL 93 11/22/2021    CO2 26 11/22/2021    CO2 25 11/22/2021    BUN 14 11/22/2021    BUN 12 11/22/2021    LABALBU 4.7 11/22/2021    CREATININE 0.71 11/22/2021    CREATININE 0.73 11/22/2021    CALCIUM 10.1 11/22/2021    CALCIUM 9.3 11/22/2021    GFRAA >60.0 11/22/2021    GFRAA >60.0 11/22/2021    LABGLOM >60.0 11/22/2021    LABGLOM >60.0 11/22/2021    GLUCOSE 94 11/22/2021    GLUCOSE 98 11/22/2021     Magnesium:    Lab Results   Component Value Date    MG 2.9 11/22/2021     TSH:  Lab Results   Component Value Date    TSH 3.230 02/05/2019             Patient Active Problem List   Diagnosis    Diverticulosis    Irregular periods    Uterine leiomyoma    Rash and nonspecific skin eruption    Acute gastritis    Generalized abdominal pain    DUB (dysfunctional uterine bleeding)    Tobacco abuse    Acute myocardial infarction ([facility] [address]    Carotid occlusion, bilateral       There are no discontinued medications. Modified Medications    No medications on file       Orders Placed This Encounter   Medications    lisinopril-hydroCHLOROthiazide (PRINZIDE;ZESTORETIC) 20-25 MG per tablet     Sig: Take 1 tablet by mouth daily     Dispense:  30 tablet     Refill:  3        Assessment/Plan:    1. Coronary artery disease involving native coronary artery of native heart without angina pectoris  Stable no angina. continue meds    2. Essential hypertension  Elevated with h/a- will add ACEI/HCTZ    3. Dyslipidemia  Resume Atorvastatin at lower dose of 40 qd. rechck labs. Low fat diet. - Lipid, Fasting; Future    4.  ST elevation myocardial infarction involving right coronary artery ([facility] [address]  5. Left Carotid stent- stable. Counseling:  Heart Healthy Lifestyle, Improve BMI, Low Salt Diet, Take Precautions to Prevent Falls and Walk Daily    Return for As scheduled.     Electronically signed by Jo Ann Elizalde MD on 11/30/2021 at 1:31 PM

## 2021-12-01 ENCOUNTER — APPOINTMENT (OUTPATIENT)
Dept: CT IMAGING | Age: 51
End: 2021-12-01
Payer: COMMERCIAL

## 2021-12-01 ENCOUNTER — APPOINTMENT (OUTPATIENT)
Dept: GENERAL RADIOLOGY | Age: 51
End: 2021-12-01
Payer: COMMERCIAL

## 2021-12-01 ENCOUNTER — HOSPITAL ENCOUNTER (EMERGENCY)
Age: 51
Discharge: HOME | End: 2021-12-01
Attending: EMERGENCY MEDICINE
Payer: COMMERCIAL

## 2021-12-01 VITALS
HEIGHT: 64 IN | SYSTOLIC BLOOD PRESSURE: 131 MMHG | OXYGEN SATURATION: 98 % | TEMPERATURE: 98.5 F | BODY MASS INDEX: 29.02 KG/M2 | WEIGHT: 170 LBS | DIASTOLIC BLOOD PRESSURE: 84 MMHG | HEART RATE: 68 BPM | RESPIRATION RATE: 16 BRPM

## 2021-12-01 DIAGNOSIS — G45.9 TIA (TRANSIENT ISCHEMIC ATTACK): Primary | ICD-10-CM

## 2021-12-01 LAB
ALBUMIN SERPL-MCNC: 4.5 G/DL (ref 3.5–4.6)
ALP BLD-CCNC: 101 U/L (ref 40–130)
ALT SERPL-CCNC: 31 U/L (ref 0–33)
ANION GAP SERPL CALCULATED.3IONS-SCNC: 16 MEQ/L (ref 9–15)
APTT: 27.1 SEC (ref 24.4–36.8)
AST SERPL-CCNC: 19 U/L (ref 0–35)
BASOPHILS ABSOLUTE: 0.1 K/UL (ref 0–0.2)
BASOPHILS RELATIVE PERCENT: 1 %
BILIRUB SERPL-MCNC: 0.3 MG/DL (ref 0.2–0.7)
BUN BLDV-MCNC: 13 MG/DL (ref 6–20)
CALCIUM SERPL-MCNC: 9.7 MG/DL (ref 8.5–9.9)
CHLORIDE BLD-SCNC: 98 MEQ/L (ref 95–107)
CO2: 27 MEQ/L (ref 20–31)
CREAT SERPL-MCNC: 0.74 MG/DL (ref 0.5–0.9)
EOSINOPHILS ABSOLUTE: 0.2 K/UL (ref 0–0.7)
EOSINOPHILS RELATIVE PERCENT: 2.5 %
GFR AFRICAN AMERICAN: >60
GFR NON-AFRICAN AMERICAN: >60
GLOBULIN: 3.1 G/DL (ref 2.3–3.5)
GLUCOSE BLD-MCNC: 163 MG/DL (ref 60–115)
GLUCOSE BLD-MCNC: 164 MG/DL (ref 70–99)
HCT VFR BLD CALC: 43.2 % (ref 37–47)
HEMOGLOBIN: 14.1 G/DL (ref 12–16)
INR BLD: 1
LYMPHOCYTES ABSOLUTE: 1.8 K/UL (ref 1–4.8)
LYMPHOCYTES RELATIVE PERCENT: 24.2 %
MCH RBC QN AUTO: 26.6 PG (ref 27–31.3)
MCHC RBC AUTO-ENTMCNC: 32.7 % (ref 33–37)
MCV RBC AUTO: 81.4 FL (ref 82–100)
MONOCYTES ABSOLUTE: 0.4 K/UL (ref 0.2–0.8)
MONOCYTES RELATIVE PERCENT: 5.7 %
NEUTROPHILS ABSOLUTE: 4.9 K/UL (ref 1.4–6.5)
NEUTROPHILS RELATIVE PERCENT: 66.6 %
PDW BLD-RTO: 14.7 % (ref 11.5–14.5)
PERFORMED ON: ABNORMAL
PLATELET # BLD: 332 K/UL (ref 130–400)
POTASSIUM SERPL-SCNC: 3.9 MEQ/L (ref 3.4–4.9)
PROTHROMBIN TIME: 12.9 SEC (ref 12.3–14.9)
RBC # BLD: 5.3 M/UL (ref 4.2–5.4)
SODIUM BLD-SCNC: 141 MEQ/L (ref 135–144)
TOTAL PROTEIN: 7.6 G/DL (ref 6.3–8)
WBC # BLD: 7.4 K/UL (ref 4.8–10.8)

## 2021-12-01 PROCEDURE — 85730 THROMBOPLASTIN TIME PARTIAL: CPT

## 2021-12-01 PROCEDURE — 71045 X-RAY EXAM CHEST 1 VIEW: CPT

## 2021-12-01 PROCEDURE — 93005 ELECTROCARDIOGRAM TRACING: CPT | Performed by: EMERGENCY MEDICINE

## 2021-12-01 PROCEDURE — 6360000004 HC RX CONTRAST MEDICATION: Performed by: EMERGENCY MEDICINE

## 2021-12-01 PROCEDURE — 80053 COMPREHEN METABOLIC PANEL: CPT

## 2021-12-01 PROCEDURE — 70496 CT ANGIOGRAPHY HEAD: CPT

## 2021-12-01 PROCEDURE — 70450 CT HEAD/BRAIN W/O DYE: CPT

## 2021-12-01 PROCEDURE — 99285 EMERGENCY DEPT VISIT HI MDM: CPT

## 2021-12-01 PROCEDURE — 36415 COLL VENOUS BLD VENIPUNCTURE: CPT

## 2021-12-01 PROCEDURE — 6370000000 HC RX 637 (ALT 250 FOR IP): Performed by: EMERGENCY MEDICINE

## 2021-12-01 PROCEDURE — 70498 CT ANGIOGRAPHY NECK: CPT

## 2021-12-01 PROCEDURE — 85610 PROTHROMBIN TIME: CPT

## 2021-12-01 PROCEDURE — 85025 COMPLETE CBC W/AUTO DIFF WBC: CPT

## 2021-12-01 RX ORDER — ASPIRIN 81 MG/1
324 TABLET, CHEWABLE ORAL ONCE
Status: COMPLETED | OUTPATIENT
Start: 2021-12-01 | End: 2021-12-01

## 2021-12-01 RX ORDER — SODIUM CHLORIDE 0.9 % (FLUSH) 0.9 %
10 SYRINGE (ML) INJECTION ONCE
Status: DISCONTINUED | OUTPATIENT
Start: 2021-12-01 | End: 2021-12-01 | Stop reason: HOSPADM

## 2021-12-01 RX ADMIN — ASPIRIN 324 MG: 81 TABLET, CHEWABLE ORAL at 18:42

## 2021-12-01 RX ADMIN — IOPAMIDOL 100 ML: 612 INJECTION, SOLUTION INTRAVENOUS at 17:05

## 2021-12-01 ASSESSMENT — ENCOUNTER SYMPTOMS
CHEST TIGHTNESS: 0
EYE PAIN: 0
SHORTNESS OF BREATH: 0
SORE THROAT: 0
VOMITING: 0
ABDOMINAL PAIN: 0
NAUSEA: 0

## 2021-12-01 NOTE — ED NOTES
Pt states her right arm is feeling better but still strange, states she wasn't able to text with it when she arrived but she can now.      Dyllan Keys, JAMES  12/01/21 1043

## 2021-12-01 NOTE — ED NOTES
Dr Vik Call advised pt is going to be discharged home with a F/U appointment scheduled with neurology. Dr Vik Call discussed all this with the pt.      Cristy Carmichael RN  12/01/21 0160

## 2021-12-01 NOTE — ED PROVIDER NOTES
[facility] ED  EMERGENCY DEPARTMENT ENCOUNTER      Pt Name: Marcie Garcia  MRN: [id number]  [place] 1970  Date of evaluation: 12/1/2021  Provider: Ed Medrano, [address] [place] [address]       Chief Complaint   Patient presents with    Numbness         HISTORY OF PRESENT ILLNESS   (Location/Symptom, Timing/Onset, Context/Setting, Quality, Duration, Modifying Factors, Severity)  Note limiting factors. Marcie Garcia is a 46 y.o. female who presents to the emergency department . Patient comes in with numbness to the right side of her face right arm right leg. States that within the half hour she felt like she was dragging her right leg and had some slurred speech. Patient has a stent in her left internal carotid artery. Right internal rotted artery has 100% stenosis. Patient is on dual platelet therapy and also treated for hypertension. Patient quit smoking in August after MI.    HPI    Nursing Notes were reviewed. REVIEW OF SYSTEMS    (2-9 systems for level 4, 10 or more for level 5)     Review of Systems   Constitutional: Negative for activity change, appetite change and fatigue. HENT: Negative for congestion and sore throat. Eyes: Negative for pain and visual disturbance. Respiratory: Negative for chest tightness and shortness of breath. Cardiovascular: Negative for chest pain. Gastrointestinal: Negative for abdominal pain, nausea and vomiting. Endocrine: Negative for polydipsia. Genitourinary: Negative for flank pain and urgency. Musculoskeletal: Negative for gait problem and neck stiffness. Skin: Negative for rash. Neurological: Positive for speech difficulty, weakness and numbness. Negative for light-headedness and headaches. Psychiatric/Behavioral: Negative for confusion and sleep disturbance. Except as noted above the remainder of the review of systems was reviewed and negative.        PAST MEDICAL HISTORY     Past Medical History:   Diagnosis Date    Abnormal Pap smear of cervix     CAD (coronary artery disease)     Diverticular disease     Diverticulitis          SURGICAL HISTORY       Past Surgical History:   Procedure Laterality Date    DILATION AND CURETTAGE           CURRENT MEDICATIONS       Previous Medications    ACETAMINOPHEN (TYLENOL) 500 MG TABLET    Take 2 tablets by mouth every 6 hours as needed for Pain or Fever    ASCORBIC ACID (VITAMIN C) 500 MG TABLET    Take 500 mg by mouth daily    ASPIRIN 81 MG CHEWABLE TABLET    Take 1 tablet by mouth daily    ATORVASTATIN (LIPITOR) 80 MG TABLET    Take 0.5 tablets by mouth nightly    B COMPLEX VITAMINS CAPSULE    Take 1 capsule by mouth daily    CARVEDILOL (COREG) 3.125 MG TABLET    TAKE 1 TABLET TWICE A DAY    CLOPIDOGREL (PLAVIX) 75 MG TABLET    Take 1 tablet by mouth daily    IBUPROFEN (ADVIL;MOTRIN) 600 MG TABLET    Take 1 tablet by mouth 4 times daily as needed for Pain    LISINOPRIL-HYDROCHLOROTHIAZIDE (PRINZIDE;ZESTORETIC) 20-25 MG PER TABLET    Take 1 tablet by mouth daily    NITROGLYCERIN (NITROSTAT) 0.4 MG SL TABLET    up to max of 3 total doses. If no relief after 1 dose, call 911.        ALLERGIES     Sulfa antibiotics    FAMILY HISTORY       Family History   Problem Relation Age of Onset    No Known Problems Father     No Known Problems Mother     No Known Problems Paternal Grandfather     No Known Problems Paternal Grandmother     No Known Problems Maternal Grandmother     No Known Problems Maternal Grandfather     No Known Problems Brother     No Known Problems Sister     No Known Problems Other     Breast Cancer Neg Hx     Cancer Neg Hx     Colon Cancer Neg Hx     Diabetes Neg Hx     Eclampsia Neg Hx     Hypertension Neg Hx     Ovarian Cancer Neg Hx      Labor Neg Hx     Spont Abortions Neg Hx     Stroke Neg Hx           SOCIAL HISTORY       Social History     Socioeconomic History    Marital status: Single     Spouse name: Not on file    Number of children: Not on file    Years of education: Not on file    Highest education level: Not on file   Occupational History    Not on file   Tobacco Use    Smoking status: Former Smoker     Packs/day: 0.25     Quit date: 10/25/2021     Years since quittin.1    Smokeless tobacco: Never Used   Substance and Sexual Activity    Alcohol use: Yes     Comment: socially    Drug use: No    Sexual activity: Not Currently   Other Topics Concern    Not on file   Social History Narrative    Not on file     Social Determinants of Health     Financial Resource Strain: Low Risk     Difficulty of Paying Living Expenses: Not very hard   Food Insecurity: No Food Insecurity    Worried About Running Out of Food in the Last Year: Never true    Geni of Food in the Last Year: Never true   Transportation Needs: No Transportation Needs    Lack of Transportation (Medical): No    Lack of Transportation (Non-Medical):  No   Physical Activity:     Days of Exercise per Week: Not on file    Minutes of Exercise per Session: Not on file   Stress:     Feeling of Stress : Not on file   Social Connections:     Frequency of Communication with Friends and Family: Not on file    Frequency of Social Gatherings with Friends and Family: Not on file    Attends Oriental orthodox Services: Not on file    Active Member of [address] [place] [address] [email or web address] or Organizations: Not on file    Attends Club or Organization Meetings: Not on file    Marital Status: Not on file   Intimate Partner Violence:     Fear of Current or Ex-Partner: Not on file    Emotionally Abused: Not on file    Physically Abused: Not on file    Sexually Abused: Not on file   Housing Stability:     Unable to Pay for Housing in the Last Year: Not on file    Number of [place] in the Last Year: Not on file    Unstable Housing in the Last Year: Not on file       SCREENINGS                        PHYSICAL EXAM    (up to 7 for level 4, 8 or more for level 5)     ED Triage Vitals [21 1640] BP Temp Temp src Pulse Resp SpO2 Height Weight   (!) 151/83 98.5 °F (36.9 °C) -- -- 18 98 % 5' 4\" (1.626 m) 170 lb (77.1 kg)       Physical Exam  Vitals and nursing note reviewed. Constitutional:       General: She is not in acute distress. Appearance: She is well-developed. She is not ill-appearing or diaphoretic. HENT:      Head: Normocephalic and atraumatic. Right Ear: External ear normal.      Left Ear: External ear normal.      Mouth/Throat:      Pharynx: No oropharyngeal exudate. Eyes:      Conjunctiva/sclera: Conjunctivae normal.      Pupils: Pupils are equal, round, and reactive to light. Neck:      Thyroid: No thyromegaly. Vascular: No JVD. Trachea: No tracheal deviation. Cardiovascular:      Rate and Rhythm: Normal rate. Heart sounds: Normal heart sounds. No murmur heard. Pulmonary:      Effort: Pulmonary effort is normal. No respiratory distress. Breath sounds: Normal breath sounds. No wheezing. Abdominal:      General: Bowel sounds are normal.      Palpations: Abdomen is soft. Tenderness: There is no abdominal tenderness. There is no guarding. Musculoskeletal:         General: Normal range of motion. Cervical back: Normal range of motion and neck supple. Skin:     General: Skin is warm and dry. Findings: No rash. Neurological:      Mental Status: She is alert and oriented to person, place, and time. Cranial Nerves: No cranial nerve deficit. Sensory: Sensory deficit present. Motor: No weakness. Psychiatric:         Behavior: Behavior normal.         DIAGNOSTIC RESULTS     EKG: All EKG's are interpreted by the Emergency Department Physician who either signs or Co-signs this chart in the absence of a cardiologist.    Normal sinus rhythm 66 bpm left atrial enlargement.   No acute ischemia    RADIOLOGY:   Non-plain film images such as CT, Ultrasound and MRI are read by the radiologist. Plain radiographic images are visualized DISCHARGE MEDICATIONS:  Discharge Medication List as of 12/1/2021  7:37 PM        Controlled Substances Monitoring:     No flowsheet data found.     (Please note that portions of this note were completed with a voice recognition program.  Efforts were made to edit the dictations but occasionally words are mis-transcribed.)    Yasmin Aburto DO (electronically signed)  Attending Emergency Physician            Yasmin Aburto DO  12/08/21 [id number]

## 2021-12-02 LAB
EKG ATRIAL RATE: 66 BPM
EKG P AXIS: 60 DEGREES
EKG P-R INTERVAL: 164 MS
EKG Q-T INTERVAL: 396 MS
EKG QRS DURATION: 84 MS
EKG QTC CALCULATION (BAZETT): 415 MS
EKG R AXIS: 18 DEGREES
EKG T AXIS: 27 DEGREES
EKG VENTRICULAR RATE: 66 BPM
GFR AFRICAN AMERICAN: >60
GFR NON-AFRICAN AMERICAN: >60
PERFORMED ON: NORMAL
POC CREATININE: 0.8 MG/DL (ref 0.6–1.1)
POC SAMPLE TYPE: NORMAL

## 2021-12-02 PROCEDURE — 93010 ELECTROCARDIOGRAM REPORT: CPT | Performed by: INTERNAL MEDICINE

## 2021-12-06 ENCOUNTER — OFFICE VISIT (OUTPATIENT)
Dept: NEUROLOGY | Age: 51
End: 2021-12-06
Payer: COMMERCIAL

## 2021-12-06 VITALS
DIASTOLIC BLOOD PRESSURE: 76 MMHG | BODY MASS INDEX: 27.98 KG/M2 | WEIGHT: 163 LBS | SYSTOLIC BLOOD PRESSURE: 128 MMHG | HEART RATE: 74 BPM

## 2021-12-06 DIAGNOSIS — I65.21 CAROTID OCCLUSION, RIGHT: ICD-10-CM

## 2021-12-06 DIAGNOSIS — Z95.828 PRESENCE OF INTERNAL CAROTID STENT: ICD-10-CM

## 2021-12-06 DIAGNOSIS — I63.512 CEREBROVASCULAR ACCIDENT (CVA) DUE TO STENOSIS OF LEFT MIDDLE CEREBRAL ARTERY (HCC): ICD-10-CM

## 2021-12-06 DIAGNOSIS — G97.82: Primary | ICD-10-CM

## 2021-12-06 PROCEDURE — G8484 FLU IMMUNIZE NO ADMIN: HCPCS | Performed by: PSYCHIATRY & NEUROLOGY

## 2021-12-06 PROCEDURE — G8419 CALC BMI OUT NRM PARAM NOF/U: HCPCS | Performed by: PSYCHIATRY & NEUROLOGY

## 2021-12-06 PROCEDURE — 99204 OFFICE O/P NEW MOD 45 MIN: CPT | Performed by: PSYCHIATRY & NEUROLOGY

## 2021-12-06 PROCEDURE — 1036F TOBACCO NON-USER: CPT | Performed by: PSYCHIATRY & NEUROLOGY

## 2021-12-06 PROCEDURE — 3017F COLORECTAL CA SCREEN DOC REV: CPT | Performed by: PSYCHIATRY & NEUROLOGY

## 2021-12-06 PROCEDURE — 1111F DSCHRG MED/CURRENT MED MERGE: CPT | Performed by: PSYCHIATRY & NEUROLOGY

## 2021-12-06 PROCEDURE — G8427 DOCREV CUR MEDS BY ELIG CLIN: HCPCS | Performed by: PSYCHIATRY & NEUROLOGY

## 2021-12-06 ASSESSMENT — ENCOUNTER SYMPTOMS
BACK PAIN: 0
COLOR CHANGE: 0
SHORTNESS OF BREATH: 0
VOMITING: 0
CHOKING: 0
PHOTOPHOBIA: 0
NAUSEA: 0
TROUBLE SWALLOWING: 0

## 2021-12-06 NOTE — PROGRESS NOTES
Subjective:      Patient ID: Gricelda Rayo is a 46 y.o. female who presents today for:  Chief Complaint   Patient presents with    New Patient     Pt states that she had a stent surgery in the carotid, she says that the first week she got home she was fine then she felt like blood was rushing from one side of her head to the other, she says she was getting headaches, and that her bp was rising. She says she went to the er and was told she was fine, she says that her cardiologist put her on lisinolpril.  Other     She says that a week later she was having numbness in her arm and leg, and slurring her words. She went to er again and says they did a ct scan and told her it was clear and senther home again. She says that she had a heart attack, august 25th and is unsure if this could have something to do with what is going on. She states that she is having hot flashes currently. She says that she will feel a pressure going through her head, pressure behind the left eye, and pain in the chest.         HPI 78-year-old right-handed female admitted to [facility] for abnormal carotid ultrasound with 685% R ICA 52% of LICA. Patient had a CT of the neck which confirmed an occluded right ICA and near occlusion of the left ICA. Patient underwent a left carotid angiogram with a left carotid stent. This was discovered with a bruit as she has multiple risk factors and known coronary artery disease with hypertension and dyslipidemia. After this patient was seen in the emergency room on 1 December with complaints of numbness of her right side and numbness of her right leg. She had a significant been in her head at that time and felt that she was going to break the migraine headache but had a dull headache all the time. Patient had a previous history of myocardial infarction in August she discontinued smoking. Patient is seen here today after ER visits and CT angiograms were reviewed.   She actually had 2 CT angiograms and both show patency of the left internal carotid. Patient's only symptoms today are that she feels somewhat lightheaded and woozy but she has no difficulty with balance. The headache has not become migrainous but she is having some true anxiety with this. Past Medical History:   Diagnosis Date    Abnormal Pap smear of cervix     CAD (coronary artery disease)     Diverticular disease     Diverticulitis      Past Surgical History:   Procedure Laterality Date    DILATION AND CURETTAGE       Social History     Socioeconomic History    Marital status: Single     Spouse name: Not on file    Number of children: Not on file    Years of education: Not on file    Highest education level: Not on file   Occupational History    Not on file   Tobacco Use    Smoking status: Former Smoker     Packs/day: 0.25     Quit date: 10/25/2021     Years since quittin.1    Smokeless tobacco: Never Used   Substance and Sexual Activity    Alcohol use: Yes     Comment: socially    Drug use: No    Sexual activity: Not Currently   Other Topics Concern    Not on file   Social History Narrative    Not on file     Social Determinants of Health     Financial Resource Strain: Low Risk     Difficulty of Paying Living Expenses: Not very hard   Food Insecurity: No Food Insecurity    Worried About Running Out of Food in the Last Year: Never true    Geni of Food in the Last Year: Never true   Transportation Needs: No Transportation Needs    Lack of Transportation (Medical): No    Lack of Transportation (Non-Medical):  No   Physical Activity:     Days of Exercise per Week: Not on file    Minutes of Exercise per Session: Not on file   Stress:     Feeling of Stress : Not on file   Social Connections:     Frequency of Communication with Friends and Family: Not on file    Frequency of Social Gatherings with Friends and Family: Not on file    Attends Congregational Services: Not on file   [employer] or Organizations: Not on file    Attends Club or Organization Meetings: Not on file    Marital Status: Not on file   Intimate Partner Violence:     Fear of Current or Ex-Partner: Not on file    Emotionally Abused: Not on file    Physically Abused: Not on file    Sexually Abused: Not on file   Housing Stability:     Unable to Pay for Housing in the Last Year: Not on file    Number of Places Lived in the Last Year: Not on file    Unstable Housing in the Last Year: Not on file     Family History   Problem Relation Age of Onset    No Known Problems Father     No Known Problems Mother     No Known Problems Paternal Grandfather     No Known Problems Paternal Grandmother     No Known Problems Maternal Grandmother     No Known Problems Maternal Grandfather     No Known Problems Brother     No Known Problems Sister     No Known Problems Other     Breast Cancer Neg Hx     Cancer Neg Hx     Colon Cancer Neg Hx     Diabetes Neg Hx     Eclampsia Neg Hx     Hypertension Neg Hx     Ovarian Cancer Neg Hx      Labor Neg Hx     Spont Abortions Neg Hx     Stroke Neg Hx      Allergies   Allergen Reactions    Sulfa Antibiotics Rash       Current Outpatient Medications   Medication Sig Dispense Refill    lisinopril-hydroCHLOROthiazide (PRINZIDE;ZESTORETIC) 20-25 MG per tablet Take 1 tablet by mouth daily 30 tablet 3    acetaminophen (TYLENOL) 500 MG tablet Take 2 tablets by mouth every 6 hours as needed for Pain or Fever 60 tablet 0    ascorbic acid (VITAMIN C) 500 MG tablet Take 500 mg by mouth daily      b complex vitamins capsule Take 1 capsule by mouth daily      atorvastatin (LIPITOR) 80 MG tablet Take 0.5 tablets by mouth nightly 30 tablet 3    carvedilol (COREG) 3.125 MG tablet TAKE 1 TABLET TWICE A DAY      aspirin 81 MG chewable tablet Take 1 tablet by mouth daily 30 tablet 3    nitroGLYCERIN (NITROSTAT) 0.4 MG SL tablet up to max of 3 total doses.  If no relief after 1 dose, call [phone]. [phone] tablet 3    clopidogrel (PLAVIX) 75 MG tablet Take 1 tablet by mouth daily 30 tablet 3     No current facility-administered medications for this visit. Review of Systems   Constitutional: Negative for fever. HENT: Negative for ear pain, tinnitus and trouble swallowing. Eyes: Negative for photophobia and visual disturbance. Respiratory: Negative for choking and shortness of breath. Cardiovascular: Negative for chest pain and palpitations. Gastrointestinal: Negative for nausea and vomiting. Musculoskeletal: Negative for back pain, gait problem, joint swelling, myalgias, neck pain and neck stiffness. Skin: Negative for color change. Allergic/Immunologic: Negative for food allergies. Neurological: Positive for light-headedness and headaches. Negative for dizziness, tremors, seizures, syncope, facial asymmetry, speech difficulty, weakness and numbness. Psychiatric/Behavioral: Negative for behavioral problems, confusion, hallucinations and sleep disturbance. Objective:   /76 (Site: Left Upper Arm, Position: Sitting, Cuff Size: Medium Adult)   Pulse 74   Wt 163 lb (73.9 kg)   BMI 27.98 kg/m²     Physical Exam  Vitals reviewed. Eyes:      Pupils: Pupils are equal, round, and reactive to light. Cardiovascular:      Rate and Rhythm: Normal rate and regular rhythm. Heart sounds: No murmur heard. Pulmonary:      Effort: Pulmonary effort is normal.      Breath sounds: Normal breath sounds. Abdominal:      General: Bowel sounds are normal.   Musculoskeletal:         General: Normal range of motion. Cervical back: Normal range of motion. Skin:     General: Skin is warm. Neurological:      Mental Status: She is alert and oriented to person, place, and time. Cranial Nerves: No cranial nerve deficit. Sensory: No sensory deficit. Motor: No abnormal muscle tone.       Coordination: Coordination normal.      Deep Tendon Reflexes: Reflexes are normal and incident. 2-D images were reconstructed in the sagittal and coronal planes. Three Dimensional Maximum Intensity Projection (3D-MIP) images were created. All images were reviewed and primarily archived to PACS workstation. All CT scans at this facility use dose modulation, iterative reconstruction, and/or weight based dosing when appropriate to reduce radiation dose to as low as reasonably achievable. FINDINGS CTA HEAD: There are irregularities of the intracranial arteries consistent chronic arteriosclerosis, similar to the prior study. Intracranial ICAs: There is complete opacification of the right intracranial carotid artery to the level of the bifurcation with reconstitution of flow, through collaterals of the middle cerebral arteries bilaterally. Anterior Cerebral Arteries: The bilateral  A1 and A2 segments are patent. Middle Cerebral Arteries: Bilateral horizontal, insular, opercular, and cortical segments of the right and left middle cerebral cerebral arteries are patent. Vertebral Arteries And Basilar Artery: There is adequate flow in the intracranial portions of the vertebral arteries and in the basilar artery. Posterior Cerebral Arteries: Bilateral posterior cerebral arteries are patent. There is a right posterior communicating artery. Aneurysm No aneurysm or dissection in the anterior or posterior circulations. . Neurocranium The visualized neurocranium is intact. Dural Sinus: As visualized the opacified dural venous sinuses are unremarkable. The findings are unchanged since previous study demonstrating complete occlusion of the right intracranial carotid artery to the level of the bifurcation. There is good postcontrast opacification of the anterior cerebral artery and middle cerebral artery branches through collaterals. There is evidence arteriosclerosis.  EXAMINATION: CTA HEAD W WO CONTRAST, CTA NECK W WO CONTRAST DATE AND TIME:12/1/2021 5:05 PM CLINICAL HISTORY: Stroke symptoms   dysarthria COMPARISON: None TECHNIQUE: Helical CTA of the neck was performed from the aortic arch to the foramen magnum following the uneventful intravenous administration of 100 cc of nonionic contrast without incident. 2-D images were reconstructed in the sagittal and coronal planes. Three Dimensional Maximum Intensity Projection (3D-MIP) images were created. All images were reviewed and primarily archived to PACS workstation. All CT scans at this facility use dose modulation, iterative reconstruction, and/or weight based dosing when appropriate to reduce radiation dose to as low as reasonably achievable. NASCET Criteria were utilized FINDINGS CTA NECK: Aortic Arch: The visualized portions of the aortic arch and proximal arch vessels demonstrates no focal stenosis aneurysm or dissection. Carotids: The right common carotid artery demonstrates adequate postcontrast opacification. There is complete occlusion of the right internal carotid artery from the level of bifurcation the intracranial portion, which has been present and has remained unchanged since the previous exam. There is postcontrast opacification of the left common carotid artery. The patient is status post endovascular stenting of the distal common carotid artery and proximal left internal carotid artery which are patent. The distal left ICA is also patent. Vertebral Arteries: Patency: The vertebral arteries are well visualized to the level of the basilar artery. There is no focal stenosis aneurysm or dissection. Vertebral arteries are codominant. IMPRESSION: The findings are unchanged since the previous study. There is complete occlusion of the right internal carotid artery. Status post stenting of the left internal carotid artery. The left ICA is patent. XR CHEST PORTABLE    Result Date: 12/1/2021  Exam: XR CHEST PORTABLE History:  cp Technique: AP portable view of the chest obtained.  Comparison: Chest x-ray from August 25, 2021 Chest x-ray portable Findings: The cardiomediastinal silhouette is within normal limits. There are no infiltrates, consolidations or effusions. Bones of the thorax appear intact. No radiographic evidence of acute intrathoracic process. Lab Results   Component Value Date    WBC 7.4 12/01/2021    RBC 5.30 12/01/2021    HGB 14.1 12/01/2021    HCT 43.2 12/01/2021    MCV 81.4 12/01/2021    MCH 26.6 12/01/2021    MCHC 32.7 12/01/2021    RDW 14.7 12/01/2021     12/01/2021    MPV 10.1 04/08/2015     Lab Results   Component Value Date     12/01/2021    K 3.9 12/01/2021    CL 98 12/01/2021    CO2 27 12/01/2021    BUN 13 12/01/2021    CREATININE 0.74 12/01/2021    CREATININE 0.8 12/01/2021    GFRAA >60.0 12/01/2021    LABGLOM >60.0 12/01/2021    GLUCOSE 164 12/01/2021    PROT 7.6 12/01/2021    LABALBU 4.5 12/01/2021    CALCIUM 9.7 12/01/2021    BILITOT 0.3 12/01/2021    ALKPHOS 101 12/01/2021    AST 19 12/01/2021    ALT 31 12/01/2021     Lab Results   Component Value Date    PROTIME 12.9 12/01/2021    INR 1.0 12/01/2021     Lab Results   Component Value Date    TSH 3.230 02/05/2019     Lab Results   Component Value Date    TRIG 197 11/15/2021    HDL 43 11/15/2021    LDLCALC 87 11/15/2021     No results found for: Thermon Fear, LABBENZ, CANNAB, COCAINESCRN, LABMETH, OPIATESCREENURINE, PHENCYCLIDINESCREENURINE, PPXUR, ETOH  No results found for: LITHIUM, DILFRTOT, VALPROATE    Assessment:       Diagnosis Orders   1. Hyperperfusion syndrome, status post carotid endarterectomy     2. Cerebrovascular accident (CVA) due to stenosis of left middle cerebral artery ([address]  MRI BRAIN WO CONTRAST   3. Carotid occlusion, right     4. Presence of internal carotid stent     Hyperperfusion syndrome secondary to opening of the carotid artery on the left of 99% with an occluded right internal carotid. These findings are consistent with the syndrome and likely may take 6 to 8 weeks to reach a equilibrium.   Some of the symptoms will be an anxiety of the whole disorder as well as new medications. Patient's carotid status was discovered with a bruit and she did not have a cerebral TIA in the past.  She does have a mild headache which is not breaking into her migraine headaches and likely will settle down. Her blood pressures have been better she continues on few other medications. She has significant history of cardiovascular disease and likely to have cerebrovascular disease. ER records and CT angiograms were reviewed in detail. Patient may have had a subtle TIA after the carotid stenting and a small stroke from the stenting cannot be ruled out. I recommended MRI of the brain to further follow this up but her examination total is nonfocal at this time. We will continue to keep a close observation of the syndrome. She has not developed any seizures. We have reassured her regarding these findings and will continue keep observation of the same. It should be followed for other peripheral vascular disease. Plan:      Orders Placed This Encounter   Procedures    MRI BRAIN WO CONTRAST     Standing Status:   Future     Standing Expiration Date:   12/6/2022     Order Specific Question:   Reason for exam:     Answer:   had carotid stent November 18th     No orders of the defined types were placed in this encounter. Return in about 2 months (around 2/6/2022).       Twila Jefferson MD

## 2021-12-15 ENCOUNTER — TELEPHONE (OUTPATIENT)
Dept: NEUROLOGY | Age: 51
End: 2021-12-15

## 2021-12-15 DIAGNOSIS — Z86.59 HISTORY OF CLAUSTROPHOBIA: Primary | ICD-10-CM

## 2021-12-17 RX ORDER — DIAZEPAM 5 MG/1
TABLET ORAL
Qty: 2 TABLET | Refills: 0 | Status: SHIPPED | OUTPATIENT
Start: 2021-12-17 | End: 2022-01-17

## 2021-12-23 ENCOUNTER — HOSPITAL ENCOUNTER (OUTPATIENT)
Dept: MRI IMAGING | Age: 51
Discharge: HOME | End: 2021-12-25
Payer: COMMERCIAL

## 2021-12-23 DIAGNOSIS — I63.512 CEREBROVASCULAR ACCIDENT (CVA) DUE TO STENOSIS OF LEFT MIDDLE CEREBRAL ARTERY (HCC): ICD-10-CM

## 2021-12-23 PROCEDURE — 70551 MRI BRAIN STEM W/O DYE: CPT

## 2021-12-27 ENCOUNTER — OFFICE VISIT (OUTPATIENT)
Dept: CARDIOLOGY CLINIC | Age: 51
End: 2021-12-27
Payer: COMMERCIAL

## 2021-12-27 VITALS
SYSTOLIC BLOOD PRESSURE: 90 MMHG | HEART RATE: 72 BPM | WEIGHT: 161 LBS | BODY MASS INDEX: 27.64 KG/M2 | DIASTOLIC BLOOD PRESSURE: 60 MMHG

## 2021-12-27 DIAGNOSIS — E78.5 HYPERLIPIDEMIA, UNSPECIFIED HYPERLIPIDEMIA TYPE: ICD-10-CM

## 2021-12-27 DIAGNOSIS — I65.23 BILATERAL CAROTID ARTERY STENOSIS: ICD-10-CM

## 2021-12-27 DIAGNOSIS — I21.11 ST ELEVATION MYOCARDIAL INFARCTION INVOLVING RIGHT CORONARY ARTERY (HCC): ICD-10-CM

## 2021-12-27 DIAGNOSIS — Z98.890 HISTORY OF LEFT COMMON CAROTID ARTERY STENT PLACEMENT: ICD-10-CM

## 2021-12-27 DIAGNOSIS — Z95.828 HISTORY OF LEFT COMMON CAROTID ARTERY STENT PLACEMENT: ICD-10-CM

## 2021-12-27 DIAGNOSIS — E78.5 DYSLIPIDEMIA: ICD-10-CM

## 2021-12-27 DIAGNOSIS — G45.9 TIA (TRANSIENT ISCHEMIC ATTACK): ICD-10-CM

## 2021-12-27 DIAGNOSIS — I10 ESSENTIAL HYPERTENSION: Primary | ICD-10-CM

## 2021-12-27 DIAGNOSIS — I65.22 STENOSIS OF LEFT CAROTID ARTERY: ICD-10-CM

## 2021-12-27 DIAGNOSIS — I25.10 CORONARY ARTERY DISEASE INVOLVING NATIVE CORONARY ARTERY OF NATIVE HEART WITHOUT ANGINA PECTORIS: ICD-10-CM

## 2021-12-27 PROCEDURE — G8484 FLU IMMUNIZE NO ADMIN: HCPCS | Performed by: INTERNAL MEDICINE

## 2021-12-27 PROCEDURE — 3017F COLORECTAL CA SCREEN DOC REV: CPT | Performed by: INTERNAL MEDICINE

## 2021-12-27 PROCEDURE — G8427 DOCREV CUR MEDS BY ELIG CLIN: HCPCS | Performed by: INTERNAL MEDICINE

## 2021-12-27 PROCEDURE — G8419 CALC BMI OUT NRM PARAM NOF/U: HCPCS | Performed by: INTERNAL MEDICINE

## 2021-12-27 PROCEDURE — 99214 OFFICE O/P EST MOD 30 MIN: CPT | Performed by: INTERNAL MEDICINE

## 2021-12-27 PROCEDURE — 1036F TOBACCO NON-USER: CPT | Performed by: INTERNAL MEDICINE

## 2021-12-27 RX ORDER — LISINOPRIL AND HYDROCHLOROTHIAZIDE 25; 20 MG/1; MG/1
0.5 TABLET ORAL DAILY
Qty: 30 TABLET | Refills: 3
Start: 2021-12-27 | End: 2022-02-23

## 2021-12-27 ASSESSMENT — ENCOUNTER SYMPTOMS
STRIDOR: 0
EYES NEGATIVE: 1
GASTROINTESTINAL NEGATIVE: 1
COUGH: 0
RESPIRATORY NEGATIVE: 1
NAUSEA: 0
SHORTNESS OF BREATH: 0
BLOOD IN STOOL: 0
CHEST TIGHTNESS: 0
WHEEZING: 0

## 2021-12-27 NOTE — PROGRESS NOTES
OFFICE VISIT        Patient: Isela Kam  YOB: 1970  MRN: [id number]    Chief Complaint: STEMI HTN HPL   Chief Complaint   Patient presents with    Coronary Artery Disease    Hypertension    1 Month Follow-Up       CV Data:  10/21 LE Art - negative   8/221 Echo EF 55-60 Trace MR  8/25/21 STEMI RCA Px ABELARDO. CX distal 70-75%   49/57 CUS % LICA >99   50/68/46 Left Carotid stent. %    Subjective/HPI Dr. Darcy Lord pt changing due to insurance. Pt has had rare pressure when working as a . These symptoms are different than her MI symptoms. Compliant with meds. Pt was taken off statin for unknown reason. 11/12/21 asymptomatic but has severe carotid dz. LICA >47% and LIU occluded. occ chest heaviness. No sob. 90/75/34 had LICA Stent did well. Recent HTN and went to ER. Has been stable. No more headaches. No cp some sob but no worse. 11//30/21 called in due to uncontrolled BP worsea t night. No cp but occ heavy feeling. No sob. Little puffy under her eye. No bleed. 12/27/21 in ER for Stroke like symptoms. No cp no sob no falls no bleed    Work -   Former smoker- quit 8/2021. occ etoh  ++FH  Lives with sister.         EKG:    Past Medical History:   Diagnosis Date    Abnormal Pap smear of cervix     CAD (coronary artery disease)     Diverticular disease     Diverticulitis        Past Surgical History:   Procedure Laterality Date    DILATION AND CURETTAGE         Family History   Problem Relation Age of Onset    No Known Problems Father     No Known Problems Mother     No Known Problems Paternal Grandfather     No Known Problems Paternal Grandmother     No Known Problems Maternal Grandmother     No Known Problems Maternal Grandfather     No Known Problems Brother     No Known Problems Sister     No Known Problems Other     Breast Cancer Neg Hx     Cancer Neg Hx     Colon Cancer Neg Hx     Diabetes Neg Hx     Eclampsia Neg Hx     Hypertension Neg Hx     Ovarian Cancer Neg Hx      Labor Neg Hx     Spont Abortions Neg Hx     Stroke Neg Hx        Social History     Socioeconomic History    Marital status: Single     Spouse name: Not on file    Number of children: Not on file    Years of education: Not on file    Highest education level: Not on file   Occupational History    Not on file   Tobacco Use    Smoking status: Former Smoker     Packs/day: 0.25     Quit date: 10/25/2021     Years since quittin.1    Smokeless tobacco: Never Used   Substance and Sexual Activity    Alcohol use: Yes     Comment: socially    Drug use: No    Sexual activity: Not Currently   Other Topics Concern    Not on file   Social History Narrative    Not on file     Social Determinants of Health     Financial Resource Strain: Low Risk     Difficulty of Paying Living Expenses: Not very hard   Food Insecurity: No Food Insecurity    Worried About Running Out of Food in the Last Year: Never true    Geni of Food in the Last Year: Never true   Transportation Needs: No Transportation Needs    Lack of Transportation (Medical): No    Lack of Transportation (Non-Medical):  No   Physical Activity:     Days of Exercise per Week: Not on file    Minutes of Exercise per Session: Not on file   Stress:     Feeling of Stress : Not on file   Social Connections:     Frequency of Communication with Friends and Family: Not on file    Frequency of Social Gatherings with Friends and Family: Not on file    Attends Moravian Services: Not on file    Active Member of Clubs or Organizations: Not on file    Attends Club or Organization Meetings: Not on file    Marital Status: Not on file   Intimate Partner Violence:     Fear of Current or Ex-Partner: Not on file    Emotionally Abused: Not on file    Physically Abused: Not on file    Sexually Abused: Not on file   Housing Stability:     Unable to Pay for Housing in the Last Year: Not on file    Number of Places Lived in the Last Year: Not on file    Unstable Housing in the Last Year: Not on file       Allergies   Allergen Reactions    Sulfa Antibiotics Rash       Current Outpatient Medications   Medication Sig Dispense Refill    diazePAM (VALIUM) 5 MG tablet TAKE ONE TABLET 1 HOUR PRIOR TO MRI, AND ONE TABLET AT TIME OF MRI. 2 tablet 0    lisinopril-hydroCHLOROthiazide (PRINZIDE;ZESTORETIC) 20-25 MG per tablet Take 1 tablet by mouth daily 30 tablet 3    acetaminophen (TYLENOL) 500 MG tablet Take 2 tablets by mouth every 6 hours as needed for Pain or Fever 60 tablet 0    ascorbic acid (VITAMIN C) 500 MG tablet Take 500 mg by mouth daily      b complex vitamins capsule Take 1 capsule by mouth daily      atorvastatin (LIPITOR) 80 MG tablet Take 0.5 tablets by mouth nightly 30 tablet 3    carvedilol (COREG) 3.125 MG tablet TAKE 1 TABLET TWICE A DAY      aspirin 81 MG chewable tablet Take 1 tablet by mouth daily 30 tablet 3    nitroGLYCERIN (NITROSTAT) 0.4 MG SL tablet up to max of 3 total doses. If no relief after 1 dose, call 911. 25 tablet 3    clopidogrel (PLAVIX) 75 MG tablet Take 1 tablet by mouth daily 30 tablet 3     No current facility-administered medications for this visit. Review of Systems:   Review of Systems   Constitutional: Negative. Negative for diaphoresis and fatigue. HENT: Negative. Eyes: Negative. Respiratory: Negative. Negative for cough, chest tightness, shortness of breath, wheezing and stridor. Cardiovascular: Negative. Negative for chest pain, palpitations and leg swelling. Gastrointestinal: Negative. Negative for blood in stool and nausea. Genitourinary: Negative. Musculoskeletal: Negative. Skin: Negative. Neurological: Negative. Negative for dizziness, syncope, weakness and light-headedness. Hematological: Negative. Psychiatric/Behavioral: Negative.           Physical Examination:    BP 90/60 (Site: Right Upper Arm, Position: Sitting, Cuff Size: Small Adult)   Pulse 72   Wt 161 lb (73 kg)   BMI 27.64 kg/m²    Physical Exam   Constitutional: She appears healthy. No distress. HENT:   Normal cephalic and Atraumatic   Eyes: Pupils are equal, round, and reactive to light. Neck: Thyroid normal. No JVD present. No neck adenopathy. No thyromegaly present. Cardiovascular: Normal rate, regular rhythm, normal heart sounds, intact distal pulses and normal pulses. Pulmonary/Chest: Effort normal and breath sounds normal. She has no wheezes. She has no rales. She exhibits no tenderness. Abdominal: Soft. Bowel sounds are normal. There is no abdominal tenderness. Musculoskeletal:         General: No tenderness or edema. Normal range of motion. Cervical back: Normal range of motion and neck supple. Neurological: She is alert and oriented to person, place, and time. Skin: Skin is warm. No cyanosis. Nails show no clubbing.        LABS:  CBC:   Lab Results   Component Value Date    WBC 7.4 12/01/2021    RBC 5.30 12/01/2021    HGB 14.1 12/01/2021    HCT 43.2 12/01/2021    MCV 81.4 12/01/2021    MCH 26.6 12/01/2021    MCHC 32.7 12/01/2021    RDW 14.7 12/01/2021     12/01/2021    MPV 10.1 04/08/2015     Lipids:  Lab Results   Component Value Date    CHOL 169 11/15/2021     Lab Results   Component Value Date    TRIG 197 (H) 11/15/2021     Lab Results   Component Value Date    HDL 43 11/15/2021     Lab Results   Component Value Date    LDLCALC 87 11/15/2021     No results found for: LABVLDL, VLDL  No results found for: CHOLHDLRATIO  CMP:    Lab Results   Component Value Date     12/01/2021    K 3.9 12/01/2021    CL 98 12/01/2021    CO2 27 12/01/2021    BUN 13 12/01/2021    CREATININE 0.74 12/01/2021    CREATININE 0.8 12/01/2021    GFRAA >60.0 12/01/2021    LABGLOM >60.0 12/01/2021    GLUCOSE 164 12/01/2021    PROT 7.6 12/01/2021    LABALBU 4.5 12/01/2021    CALCIUM 9.7 12/01/2021    BILITOT 0.3 12/01/2021    ALKPHOS 101 12/01/2021    AST 19 12/01/2021    ALT 31 12/01/2021     BMP:    Lab Results   Component Value Date     12/01/2021    K 3.9 12/01/2021    CL 98 12/01/2021    CO2 27 12/01/2021    BUN 13 12/01/2021    LABALBU 4.5 12/01/2021    CREATININE 0.74 12/01/2021    CREATININE 0.8 12/01/2021    CALCIUM 9.7 12/01/2021    GFRAA >60.0 12/01/2021    LABGLOM >60.0 12/01/2021    GLUCOSE 164 12/01/2021     Magnesium:    Lab Results   Component Value Date    MG 2.9 11/22/2021     TSH:  Lab Results   Component Value Date    TSH 3.230 02/05/2019             Patient Active Problem List   Diagnosis    Diverticulosis    Irregular periods    Uterine leiomyoma    Rash and nonspecific skin eruption    Acute gastritis    Generalized abdominal pain    DUB (dysfunctional uterine bleeding)    Tobacco abuse    Acute myocardial infarction ([address]    Carotid occlusion, right    Hyperperfusion syndrome, status post carotid endarterectomy    Cerebrovascular accident (CVA) due to stenosis of left middle cerebral artery ([address]    Presence of internal carotid stent       There are no discontinued medications. Modified Medications    No medications on file       No orders of the defined types were placed in this encounter. Assessment/Plan:    1. Coronary artery disease involving native coronary artery of native heart without angina pectoris  Stable no angina. continue meds    2. Essential hypertension  Elevated with h/a- will cut  ACEI/HCTZ to half tab qam.  Low salt diet    3. Dyslipidemia  Resume Atorvastatin at lower dose of 40 qd. rechck labs. Low fat diet. - Lipid, Fasting; Future    4. ST elevation myocardial infarction involving right coronary artery ([address]  5. Left Carotid stent- stable. Counseling:  Heart Healthy Lifestyle, Improve BMI, Low Salt Diet, Take Precautions to Prevent Falls and Walk Daily    Return in about 3 months (around 3/27/2022).     Electronically signed by Kendra Leung MD on 12/27/2021 at 2:52 PM

## 2021-12-28 LAB — HOMOCYSTEINE: 13.1 UMOL/L

## 2022-02-03 PROBLEM — M79.674 PAIN OF TOE OF RIGHT FOOT: Status: ACTIVE | Noted: 2021-07-10

## 2022-02-03 PROBLEM — L84 CALLUS: Status: ACTIVE | Noted: 2021-07-10

## 2022-02-07 ENCOUNTER — OFFICE VISIT (OUTPATIENT)
Dept: NEUROLOGY | Age: 52
End: 2022-02-07
Payer: COMMERCIAL

## 2022-02-07 VITALS
SYSTOLIC BLOOD PRESSURE: 130 MMHG | BODY MASS INDEX: 27.98 KG/M2 | HEART RATE: 76 BPM | DIASTOLIC BLOOD PRESSURE: 82 MMHG | WEIGHT: 163 LBS

## 2022-02-07 DIAGNOSIS — G97.82: ICD-10-CM

## 2022-02-07 DIAGNOSIS — I63.512 CEREBROVASCULAR ACCIDENT (CVA) DUE TO STENOSIS OF LEFT MIDDLE CEREBRAL ARTERY (HCC): Primary | ICD-10-CM

## 2022-02-07 DIAGNOSIS — I65.21 CAROTID OCCLUSION, RIGHT: ICD-10-CM

## 2022-02-07 DIAGNOSIS — R21 RASH: ICD-10-CM

## 2022-02-07 PROCEDURE — 99214 OFFICE O/P EST MOD 30 MIN: CPT | Performed by: PSYCHIATRY & NEUROLOGY

## 2022-02-07 ASSESSMENT — ENCOUNTER SYMPTOMS
CHOKING: 0
COLOR CHANGE: 0
BACK PAIN: 0
TROUBLE SWALLOWING: 0
NAUSEA: 0
VOMITING: 0
PHOTOPHOBIA: 0
SHORTNESS OF BREATH: 0

## 2022-02-07 NOTE — PROGRESS NOTES
Subjective:      Patient ID: Davion Ashraf is a 46 y.o. female who presents today for:  Chief Complaint   Patient presents with    Follow-up     pt states she has a rash every where maybe from medications not sure which one         HPI 19-year-old right-handed female with a history of cerebrovascular disease with abnormal carotid ultrasound with 100% right ICA and 99% of left ICA stenosis. The angiogram confirms this. She is not any in terms of the same. He complains of a rash and she is not quite sure from what that is. Seen by me hyperperfusion syndrome. She had endarterectomy and continue to have this and ongoing for a while. Her examination is normal.  We will follow up with an MRI which did not rating significant. She is feeling much better from cerebrovascular standpoint. She actually had a carotid stenting and not an endarterectomy. She is feeling well otherwise except for a switch are seeing. I am not quite sure if it is a drug rash is very patchy and could be psoriatic dermatological issue. She is not any seizures.     Past Medical History:   Diagnosis Date    Abnormal Pap smear of cervix     CAD (coronary artery disease)     Diverticular disease     Diverticulitis      Past Surgical History:   Procedure Laterality Date    DILATION AND CURETTAGE       Social History     Socioeconomic History    Marital status: Single     Spouse name: Not on file    Number of children: Not on file    Years of education: Not on file    Highest education level: Not on file   Occupational History    Not on file   Tobacco Use    Smoking status: Former Smoker     Packs/day: 0.25     Quit date: 10/25/2021     Years since quittin.2    Smokeless tobacco: Never Used   Substance and Sexual Activity    Alcohol use: Yes     Comment: socially    Drug use: No    Sexual activity: Not Currently   Other Topics Concern    Not on file   Social History Narrative    Not on file     Social Determinants of Health     Financial Resource Strain: Low Risk     Difficulty of Paying Living Expenses: Not very hard   Food Insecurity: No Food Insecurity    Worried About Running Out of Food in the Last Year: Never true    Ran Out of Food in the Last Year: Never true   Transportation Needs: No Transportation Needs    Lack of Transportation (Medical): No    Lack of Transportation (Non-Medical):  No   Physical Activity:     Days of Exercise per Week: Not on file    Minutes of Exercise per Session: Not on file   Stress:     Feeling of Stress : Not on file   Social Connections:     Frequency of Communication with Friends and Family: Not on file    Frequency of Social Gatherings with Friends and Family: Not on file    Attends Orthodoxy Services: Not on file    Active Member of Clubs or Organizations: Not on file    Attends Club or Organization Meetings: Not on file    Marital Status: Not on file   Intimate Partner Violence:     Fear of Current or Ex-Partner: Not on file    Emotionally Abused: Not on file    Physically Abused: Not on file    Sexually Abused: Not on file   Housing Stability:     Unable to Pay for Housing in the Last Year: Not on file    Number of Places Lived in the Last Year: Not on file    Unstable Housing in the Last Year: Not on file     Family History   Problem Relation Age of Onset    No Known Problems Father     No Known Problems Mother     No Known Problems Paternal Grandfather     No Known Problems Paternal Grandmother     No Known Problems Maternal Grandmother     No Known Problems Maternal Grandfather     No Known Problems Brother     No Known Problems Sister     No Known Problems Other     Breast Cancer Neg Hx     Cancer Neg Hx     Colon Cancer Neg Hx     Diabetes Neg Hx     Eclampsia Neg Hx     Hypertension Neg Hx     Ovarian Cancer Neg Hx      Labor Neg Hx     Spont Abortions Neg Hx     Stroke Neg Hx      Allergies   Allergen Reactions    Sulfa Antibiotics Rash       Current Outpatient Medications   Medication Sig Dispense Refill    lisinopril-hydroCHLOROthiazide (PRINZIDE;ZESTORETIC) 20-25 MG per tablet Take 0.5 tablets by mouth daily 30 tablet 3    acetaminophen (TYLENOL) 500 MG tablet Take 2 tablets by mouth every 6 hours as needed for Pain or Fever 60 tablet 0    ascorbic acid (VITAMIN C) 500 MG tablet Take 500 mg by mouth daily      b complex vitamins capsule Take 1 capsule by mouth daily      atorvastatin (LIPITOR) 80 MG tablet Take 0.5 tablets by mouth nightly 30 tablet 3    carvedilol (COREG) 3.125 MG tablet TAKE 1 TABLET TWICE A DAY      aspirin 81 MG chewable tablet Take 1 tablet by mouth daily 30 tablet 3    nitroGLYCERIN (NITROSTAT) 0.4 MG SL tablet up to max of 3 total doses. If no relief after 1 dose, call 911. 25 tablet 3    clopidogrel (PLAVIX) 75 MG tablet Take 1 tablet by mouth daily 30 tablet 3     No current facility-administered medications for this visit. Review of Systems   Constitutional: Negative for fever. HENT: Negative for ear pain, tinnitus and trouble swallowing. Eyes: Negative for photophobia and visual disturbance. Respiratory: Negative for choking and shortness of breath. Cardiovascular: Negative for chest pain and palpitations. Gastrointestinal: Negative for nausea and vomiting. Musculoskeletal: Negative for back pain, gait problem, joint swelling, myalgias, neck pain and neck stiffness. Skin: Negative for color change. Allergic/Immunologic: Negative for food allergies. Neurological: Negative for dizziness, tremors, seizures, syncope, facial asymmetry, speech difficulty, weakness, light-headedness, numbness and headaches. Psychiatric/Behavioral: Negative for behavioral problems, confusion, hallucinations and sleep disturbance.        Objective:   /82 (Site: Left Upper Arm, Position: Sitting, Cuff Size: Medium Adult)   Pulse 76   Wt 163 lb (73.9 kg)   BMI 27.98 kg/m²     Physical Exam  Vitals reviewed. Eyes:      Pupils: Pupils are equal, round, and reactive to light. Cardiovascular:      Rate and Rhythm: Normal rate and regular rhythm. Heart sounds: No murmur heard. Pulmonary:      Effort: Pulmonary effort is normal.      Breath sounds: Normal breath sounds. Abdominal:      General: Bowel sounds are normal.   Musculoskeletal:         General: Normal range of motion. Cervical back: Normal range of motion. Skin:     General: Skin is warm. Neurological:      Mental Status: She is alert and oriented to person, place, and time. Cranial Nerves: No cranial nerve deficit. Sensory: No sensory deficit. Motor: No abnormal muscle tone. Coordination: Coordination normal.      Deep Tendon Reflexes: Reflexes are normal and symmetric. Babinski sign absent on the right side. Babinski sign absent on the left side. Psychiatric:         Mood and Affect: Mood normal.         MRI BRAIN WO CONTRAST    Result Date: 12/24/2021  EXAMINATION: MRI BRAIN WO CONTRAST CLINICAL HISTORY: I63.512 Cerebrovascular accident (CVA) due to stenosis of left middle cerebral artery ([address] ICD10 COMPARISONS: NONE AVAILABLE TECHNIQUE: Multiplanar multisequence images of the brain were obtained without contrast. Diffusion perfusion imaging was obtained. BRAIN MRI FINDINGS:  There are no extra-axial collections. There is no evidence of hemorrhage. There are no areas of perfusion diffusion signal abnormality to suggest ischemia. The susceptibility images do not demonstrate evidence of hemosiderin deposition within the brain parenchyma or the leptomeninges. There is preservation of the gray-white matter differentiation. There are numerous foci of T2/FLAIR hyperintensities in the subcortical and periventricular white matter in a symmetric distribution throughout both hemispheres.   There are scattered diverticuli perivascular spaces versus lacunar infarcts in the subcortical white matter in the basal ganglia. The sulci and ventricles are within normal limits without evidence of hydrocephalus. The midline structures are intact, the corpus callosum is within normal limits. The region of the pineal gland and the sella turcica are unremarkable. There are no space-occupying lesions in the posterior fossa. The basilar cisterns are patent. The craniocervical junction is unremarkable. The visualized portions of the orbits are within normal limits, the globes are intact. The visualized portions of the paranasal sinuses are within normal limits. The calvarium and soft tissues are unremarkable. There are no acute intracranial changes, there is no evidence of hemorrhage or acute ischemia. There are involutional changes consistent with chronic microangiopathy.       Lab Results   Component Value Date    WBC 7.4 12/01/2021    RBC 5.30 12/01/2021    HGB 14.1 12/01/2021    HCT 43.2 12/01/2021    MCV 81.4 12/01/2021    MCH 26.6 12/01/2021    MCHC 32.7 12/01/2021    RDW 14.7 12/01/2021     12/01/2021    MPV 10.1 04/08/2015     Lab Results   Component Value Date     12/01/2021    K 3.9 12/01/2021    CL 98 12/01/2021    CO2 27 12/01/2021    BUN 13 12/01/2021    CREATININE 0.74 12/01/2021    CREATININE 0.8 12/01/2021    GFRAA >60.0 12/01/2021    LABGLOM >60.0 12/01/2021    GLUCOSE 164 12/01/2021    PROT 7.6 12/01/2021    LABALBU 4.5 12/01/2021    CALCIUM 9.7 12/01/2021    BILITOT 0.3 12/01/2021    ALKPHOS 101 12/01/2021    AST 19 12/01/2021    ALT 31 12/01/2021     Lab Results   Component Value Date    PROTIME 12.9 12/01/2021    INR 1.0 12/01/2021     Lab Results   Component Value Date    TSH 3.230 02/05/2019     Lab Results   Component Value Date    TRIG 197 11/15/2021    HDL 43 11/15/2021    LDLCALC 87 11/15/2021     No results found for: LABAMPH, BARBSCNU, LABBENZ, CANNAB, COCAINESCRN, LABMETH, OPIATESCREENURINE, PHENCYCLIDINESCREENURINE, PPXUR, ETOH  No results found for: LITHIUM, DILFRTOT, VALPROATE    Assessment:       Diagnosis Orders   1. Cerebrovascular accident (CVA) due to stenosis of left middle cerebral artery ([address]     2. Rash  Amb External Referral To Dermatology   3. Carotid occlusion, right     4. Hyperperfusion syndrome, status post carotid endarterectomy     Cerebro- vascular disease with carotid occlusion and a carotid stenosis of 99% patient had a stent and then developed hyperperfusion syndrome for which she had seen her. She is feeling much better as this is now improving over time she does not complain of any symptoms from that angle. We had repeated MRI which are reviewed and does not show any new stroke or anything of concern. Patient has some risk factors for some vascular disease we will continue on Plavix and Lipitor. She has some kind of her rash and I further referred her to dermatology as this does not appear to be a typical drug rash. We will continue keep a close observation of her cerebrovascular disease and MRI was discussed with her in detail. Plan:      Orders Placed This Encounter   Procedures    Amb External Referral To Dermatology     Referral Priority:   Routine     Referral Type:   Consult for Advice and Opinion     Requested Specialty:   Dermatology     Number of Visits Requested:   1     No orders of the defined types were placed in this encounter. No follow-ups on file.       Marui Man MD

## 2022-02-22 ENCOUNTER — OFFICE VISIT (OUTPATIENT)
Dept: OBGYN CLINIC | Age: 52
End: 2022-02-22
Payer: COMMERCIAL

## 2022-02-22 VITALS
BODY MASS INDEX: 27.66 KG/M2 | DIASTOLIC BLOOD PRESSURE: 76 MMHG | HEIGHT: 64 IN | SYSTOLIC BLOOD PRESSURE: 124 MMHG | WEIGHT: 162 LBS

## 2022-02-22 DIAGNOSIS — Z01.419 WELL WOMAN EXAM WITH ROUTINE GYNECOLOGICAL EXAM: Primary | ICD-10-CM

## 2022-02-22 DIAGNOSIS — Z12.31 SCREENING MAMMOGRAM, ENCOUNTER FOR: ICD-10-CM

## 2022-02-22 PROCEDURE — 99396 PREV VISIT EST AGE 40-64: CPT | Performed by: OBSTETRICS & GYNECOLOGY

## 2022-02-22 ASSESSMENT — VISUAL ACUITY: OU: 1

## 2022-02-22 ASSESSMENT — ENCOUNTER SYMPTOMS
ABDOMINAL PAIN: 0
DIARRHEA: 0
CONSTIPATION: 0
ABDOMINAL DISTENTION: 0
RESPIRATORY NEGATIVE: 1
RECTAL PAIN: 0
BLOOD IN STOOL: 0
EYES NEGATIVE: 1
ANAL BLEEDING: 0
ALLERGIC/IMMUNOLOGIC NEGATIVE: 1
NAUSEA: 0
VOMITING: 0

## 2022-02-22 ASSESSMENT — PATIENT HEALTH QUESTIONNAIRE - PHQ9
SUM OF ALL RESPONSES TO PHQ QUESTIONS 1-9: 0
2. FEELING DOWN, DEPRESSED OR HOPELESS: 0
SUM OF ALL RESPONSES TO PHQ QUESTIONS 1-9: 0
SUM OF ALL RESPONSES TO PHQ QUESTIONS 1-9: 0
SUM OF ALL RESPONSES TO PHQ9 QUESTIONS 1 & 2: 0
SUM OF ALL RESPONSES TO PHQ QUESTIONS 1-9: 0
1. LITTLE INTEREST OR PLEASURE IN DOING THINGS: 0

## 2022-02-22 NOTE — PROGRESS NOTES
Subjective:      Patient ID: Meche Knutson is a 46 y.o. female    Annual exam. Reviewed medical, surgical, social and family history. Also reviewed current medications and allergies. No PMB. No GYN complaints. Pap deferred ( negative 2021 )  and screening mammogram ordered. Monthly SBE encouraged. Screening colonoscopy recommended per routine. F/U annual exam or prn. Vitals:  /76   Ht 5' 4\" (1.626 m)   Wt 162 lb (73.5 kg)   BMI 27.81 kg/m²   Past Medical History:   Diagnosis Date    Abnormal Pap smear of cervix     CAD (coronary artery disease)     Diverticular disease     Diverticulitis      Past Surgical History:   Procedure Laterality Date    CARDIAC SURGERY      stents (2)    DILATION AND CURETTAGE       Allergies:  Sulfa antibiotics  Current Outpatient Medications   Medication Sig Dispense Refill    lisinopril-hydroCHLOROthiazide (PRINZIDE;ZESTORETIC) 20-25 MG per tablet Take 0.5 tablets by mouth daily 30 tablet 3    acetaminophen (TYLENOL) 500 MG tablet Take 2 tablets by mouth every 6 hours as needed for Pain or Fever 60 tablet 0    ascorbic acid (VITAMIN C) 500 MG tablet Take 500 mg by mouth daily      b complex vitamins capsule Take 1 capsule by mouth daily      atorvastatin (LIPITOR) 80 MG tablet Take 0.5 tablets by mouth nightly 30 tablet 3    carvedilol (COREG) 3.125 MG tablet TAKE 1 TABLET TWICE A DAY      aspirin 81 MG chewable tablet Take 1 tablet by mouth daily 30 tablet 3    nitroGLYCERIN (NITROSTAT) 0.4 MG SL tablet up to max of 3 total doses. If no relief after 1 dose, call 911. 25 tablet 3    clopidogrel (PLAVIX) 75 MG tablet Take 1 tablet by mouth daily 30 tablet 3     No current facility-administered medications for this visit.      Social History     Socioeconomic History    Marital status: Single     Spouse name: Not on file    Number of children: Not on file    Years of education: Not on file    Highest education level: Not on file   Occupational History    Not on file   Tobacco Use    Smoking status: Former Smoker     Packs/day: 0.25     Quit date: 10/25/2021     Years since quittin.3    Smokeless tobacco: Never Used   Substance and Sexual Activity    Alcohol use: Yes     Comment: socially    Drug use: No    Sexual activity: Not Currently   Other Topics Concern    Not on file   Social History Narrative    Not on file     Social Determinants of Health     Financial Resource Strain: Low Risk     Difficulty of Paying Living Expenses: Not very hard   Food Insecurity: No Food Insecurity    Worried About Running Out of Food in the Last Year: Never true    Geni of Food in the Last Year: Never true   Transportation Needs: No Transportation Needs    Lack of Transportation (Medical): No    Lack of Transportation (Non-Medical):  No   Physical Activity:     Days of Exercise per Week: Not on file    Minutes of Exercise per Session: Not on file   Stress:     Feeling of Stress : Not on file   Social Connections:     Frequency of Communication with Friends and Family: Not on file    Frequency of Social Gatherings with Friends and Family: Not on file    Attends Religion Services: Not on file    Active Member of [address] [place] [address] or Organizations: Not on file    Attends Club or Organization Meetings: Not on file    Marital Status: Not on file   Intimate Partner Violence:     Fear of Current or Ex-Partner: Not on file    Emotionally Abused: Not on file    Physically Abused: Not on file    Sexually Abused: Not on file   Housing Stability:     Unable to Pay for Housing in the Last Year: Not on file    Number of [place] in the Last Year: Not on file    Unstable Housing in the Last Year: Not on file     Family History   Problem Relation Age of Onset    No Known Problems Father     No Known Problems Mother     No Known Problems Paternal Grandfather     No Known Problems Paternal Grandmother     No Known Problems Maternal Grandmother     No Known Problems Maternal Grandfather     No Known Problems Brother     No Known Problems Sister     No Known Problems Other     Breast Cancer Neg Hx     Cancer Neg Hx     Colon Cancer Neg Hx     Diabetes Neg Hx     Eclampsia Neg Hx     Hypertension Neg Hx     Ovarian Cancer Neg Hx      Labor Neg Hx     Spont Abortions Neg Hx     Stroke Neg Hx        Review of Systems   Constitutional: Negative. Negative for activity change, appetite change, chills, diaphoresis, fatigue, fever and unexpected weight change. HENT: Negative. Eyes: Negative. Respiratory: Negative. Cardiovascular: Negative. Gastrointestinal: Negative for abdominal distention, abdominal pain, anal bleeding, blood in stool, constipation, diarrhea, nausea, rectal pain and vomiting. Endocrine: Negative. Genitourinary: Negative for decreased urine volume, difficulty urinating, dyspareunia, dysuria, enuresis, flank pain, frequency, genital sores, hematuria, menstrual problem, pelvic pain, urgency, vaginal bleeding, vaginal discharge and vaginal pain. Musculoskeletal: Negative. Skin: Negative. Allergic/Immunologic: Negative. Neurological: Negative. Hematological: Negative. Psychiatric/Behavioral: Negative. Objective:     Physical Exam  Constitutional:       Appearance: She is well-developed. HENT:      Head: Normocephalic. Eyes:      General: Lids are normal. Vision grossly intact. Neck:      Thyroid: No thyromegaly. Cardiovascular:      Rate and Rhythm: Normal rate and regular rhythm. Heart sounds: Normal heart sounds. Pulmonary:      Effort: Pulmonary effort is normal. No respiratory distress. Breath sounds: Normal breath sounds. No wheezing or rales. Chest:      Chest wall: No tenderness. Breasts:      Right: Normal. No swelling, bleeding, inverted nipple, mass, nipple discharge, skin change, tenderness, axillary adenopathy or supraclavicular adenopathy.       Left: Normal. No swelling, bleeding, inverted nipple, mass, nipple discharge, skin change, tenderness, axillary adenopathy or supraclavicular adenopathy. Abdominal:      General: There is no distension. Palpations: Abdomen is soft. There is no mass. Tenderness: There is no abdominal tenderness. There is no guarding or rebound. Hernia: No hernia is present. There is no hernia in the left inguinal area or right inguinal area. Genitourinary:     General: Normal vulva. Pubic Area: No rash. Labia:         Right: No rash, tenderness, lesion or injury. Left: No rash, tenderness, lesion or injury. Urethra: No prolapse, urethral swelling or urethral lesion. Vagina: Normal. No signs of injury and foreign body. No vaginal discharge, erythema, tenderness or bleeding. Cervix: No cervical motion tenderness, discharge or friability. Uterus: Not deviated, not enlarged, not fixed and not tender. Adnexa:         Right: No mass, tenderness or fullness. Left: No mass, tenderness or fullness. Rectum: Normal.   Musculoskeletal:         General: No tenderness. Normal range of motion. Cervical back: Normal range of motion and neck supple. Lymphadenopathy:      Cervical: No cervical adenopathy. Upper Body:      Right upper body: No supraclavicular or axillary adenopathy. Left upper body: No supraclavicular or axillary adenopathy. Lower Body: No right inguinal adenopathy. No left inguinal adenopathy. Skin:     General: Skin is warm and dry. Coloration: Skin is not pale. Findings: No erythema or rash. Neurological:      Mental Status: She is alert and oriented to person, place, and time. Psychiatric:         Behavior: Behavior normal.         Thought Content: Thought content normal.         Judgment: Judgment normal.         Assessment:       Diagnosis Orders   1. Well woman exam with routine gynecological exam     2.  Screening mammogram, encounter for  KARLA DIGITAL SCREEN W OR WO CAD BILATERAL        Plan:      Medications placedthis encounter:  No orders of the defined types were placed in this encounter. Orders placedthis encounter:  Orders Placed This Encounter   Procedures    KARLA DIGITAL SCREEN W OR WO CAD BILATERAL     Standing Status:   Future     Standing Expiration Date:   2/22/2023         Follow up:  Return in about 1 year (around 2/22/2023) for Annual.   Repeat Annual GYN exam every 1 year. Cervical Cytology exam starts age 24. If Negative Cytology;  follow-up screening per current guidelines. Mammograms yearly starting at age 36. Calcium and Vitamin D dosing reviewed ( age appropriate ). Colonoscopy and bone density screening discussed ( age appropriate ). Birth control and STD prevention discussed ( age appropriate ). Gardisil counseling completed for all patients ( age appropriate ). Routine health maintenance ( per PCP and guidelines ).

## 2022-02-22 NOTE — PROGRESS NOTES
The patient was asked if she would like a chaperone present for her intimate exam. She  declines the chaperone.  Lala King MA

## 2022-02-23 ENCOUNTER — OFFICE VISIT (OUTPATIENT)
Dept: CARDIOLOGY CLINIC | Age: 52
End: 2022-02-23
Payer: COMMERCIAL

## 2022-02-23 VITALS
SYSTOLIC BLOOD PRESSURE: 112 MMHG | OXYGEN SATURATION: 99 % | DIASTOLIC BLOOD PRESSURE: 68 MMHG | BODY MASS INDEX: 28.15 KG/M2 | WEIGHT: 164 LBS | HEART RATE: 89 BPM

## 2022-02-23 DIAGNOSIS — I10 ESSENTIAL HYPERTENSION: ICD-10-CM

## 2022-02-23 DIAGNOSIS — I65.23 BILATERAL CAROTID ARTERY STENOSIS: ICD-10-CM

## 2022-02-23 DIAGNOSIS — I21.11 ST ELEVATION MYOCARDIAL INFARCTION INVOLVING RIGHT CORONARY ARTERY (HCC): ICD-10-CM

## 2022-02-23 DIAGNOSIS — Z95.828 HISTORY OF LEFT COMMON CAROTID ARTERY STENT PLACEMENT: ICD-10-CM

## 2022-02-23 DIAGNOSIS — I25.10 CORONARY ARTERY DISEASE INVOLVING NATIVE CORONARY ARTERY OF NATIVE HEART WITHOUT ANGINA PECTORIS: ICD-10-CM

## 2022-02-23 DIAGNOSIS — Z98.890 HISTORY OF LEFT COMMON CAROTID ARTERY STENT PLACEMENT: ICD-10-CM

## 2022-02-23 DIAGNOSIS — I65.22 STENOSIS OF LEFT CAROTID ARTERY: ICD-10-CM

## 2022-02-23 DIAGNOSIS — G45.9 TIA (TRANSIENT ISCHEMIC ATTACK): ICD-10-CM

## 2022-02-23 DIAGNOSIS — E78.5 DYSLIPIDEMIA: Primary | ICD-10-CM

## 2022-02-23 PROCEDURE — 99214 OFFICE O/P EST MOD 30 MIN: CPT | Performed by: INTERNAL MEDICINE

## 2022-02-23 RX ORDER — LISINOPRIL 5 MG/1
5 TABLET ORAL DAILY
Qty: 90 TABLET | Refills: 3 | Status: SHIPPED | OUTPATIENT
Start: 2022-02-23 | End: 2022-09-27 | Stop reason: SDUPTHER

## 2022-02-23 ASSESSMENT — ENCOUNTER SYMPTOMS
RESPIRATORY NEGATIVE: 1
NAUSEA: 0
EYES NEGATIVE: 1
WHEEZING: 0
COUGH: 0
BLOOD IN STOOL: 0
SHORTNESS OF BREATH: 0
CHEST TIGHTNESS: 0
STRIDOR: 0
GASTROINTESTINAL NEGATIVE: 1

## 2022-02-23 NOTE — PROGRESS NOTES
OFFICE VISIT        Patient: Samantha Galeana  YOB: 1970  MRN: [id number]    Chief Complaint: STEMI HTN HPL   Chief Complaint   Patient presents with    Hypotension    Rash     THINKS IT IS FROM THE HCTZ       CV Data:  10/21 LE Art - negative   8/221 Echo EF 55-60 Trace MR  8/25/21 STEMI RCA Px ABELARDO. CX distal 70-75%   37/75 CUS % LICA >15   70/76/01 Left Carotid stent. %    Subjective/HPI Dr. Kimberly Lindsey pt changing due to insurance. Pt has had rare pressure when working as a . These symptoms are different than her MI symptoms. Compliant with meds. Pt was taken off statin for unknown reason. 11/12/21 asymptomatic but has severe carotid dz. LICA >75% and LIU occluded. occ chest heaviness. No sob. 85/51/30 had LICA Stent did well. Recent HTN and went to ER. Has been stable. No more headaches. No cp some sob but no worse. 11//30/21 called in due to uncontrolled BP worsea t night. No cp but occ heavy feeling. No sob. Little puffy under her eye. No bleed. 12/27/21 in ER for Stroke like symptoms. No cp no sob no falls no bleed    2/23/22 still has rash thought to be related to HCTZ. BP running low little woozy. No cp no sob no falls no bleeed    Work -   Former smoker- quit 8/2021. occ etoh  ++FH  Lives with sister.         EKG:    Past Medical History:   Diagnosis Date    Abnormal Pap smear of cervix     CAD (coronary artery disease)     Diverticular disease     Diverticulitis        Past Surgical History:   Procedure Laterality Date    CARDIAC SURGERY      stents (2)    DILATION AND CURETTAGE         Family History   Problem Relation Age of Onset    No Known Problems Father     No Known Problems Mother     No Known Problems Paternal Grandfather     No Known Problems Paternal Grandmother     No Known Problems Maternal Grandmother     No Known Problems Maternal Grandfather     No Known Problems Brother     No Known Problems Sister     No Known Problems Other     Breast Cancer Neg Hx     Cancer Neg Hx     Colon Cancer Neg Hx     Diabetes Neg Hx     Eclampsia Neg Hx     Hypertension Neg Hx     Ovarian Cancer Neg Hx      Labor Neg Hx     Spont Abortions Neg Hx     Stroke Neg Hx        Social History     Socioeconomic History    Marital status: Single     Spouse name: Not on file    Number of children: Not on file    Years of education: Not on file    Highest education level: Not on file   Occupational History    Not on file   Tobacco Use    Smoking status: Former Smoker     Packs/day: 0.25     Quit date: 10/25/2021     Years since quittin.3    Smokeless tobacco: Never Used   Substance and Sexual Activity    Alcohol use: Yes     Comment: socially    Drug use: No    Sexual activity: Not Currently   Other Topics Concern    Not on file   Social History Narrative    Not on file     Social Determinants of Health     Financial Resource Strain: Low Risk     Difficulty of Paying Living Expenses: Not very hard   Food Insecurity: No Food Insecurity    Worried About Running Out of Food in the Last Year: Never true    Geni of Food in the Last Year: Never true   Transportation Needs: No Transportation Needs    Lack of Transportation (Medical): No    Lack of Transportation (Non-Medical):  No   Physical Activity:     Days of Exercise per Week: Not on file    Minutes of Exercise per Session: Not on file   Stress:     Feeling of Stress : Not on file   Social Connections:     Frequency of Communication with Friends and Family: Not on file    Frequency of Social Gatherings with Friends and Family: Not on file    Attends Mu-ism Services: Not on file    Active Member of Clubs or Organizations: Not on file    Attends Club or Organization Meetings: Not on file    Marital Status: Not on file   Intimate Partner Violence:     Fear of Current or Ex-Partner: Not on file    Emotionally Abused: Not on file    Physically Abused: Size: Small Adult)   Pulse 89   Wt 164 lb (74.4 kg)   SpO2 99%   BMI 28.15 kg/m²    Physical Exam   Constitutional: She appears healthy. No distress. HENT:   Normal cephalic and Atraumatic   Eyes: Pupils are equal, round, and reactive to light. Neck: Thyroid normal. No JVD present. No neck adenopathy. No thyromegaly present. Cardiovascular: Normal rate, regular rhythm, normal heart sounds, intact distal pulses and normal pulses. Pulmonary/Chest: Effort normal and breath sounds normal. She has no wheezes. She has no rales. She exhibits no tenderness. Abdominal: Soft. Bowel sounds are normal. There is no abdominal tenderness. Musculoskeletal:         General: No tenderness or edema. Normal range of motion. Cervical back: Normal range of motion and neck supple. Neurological: She is alert and oriented to person, place, and time. Skin: Skin is warm. No cyanosis. Nails show no clubbing.        LABS:  CBC:   Lab Results   Component Value Date    WBC 7.4 12/01/2021    RBC 5.30 12/01/2021    HGB 14.1 12/01/2021    HCT 43.2 12/01/2021    MCV 81.4 12/01/2021    MCH 26.6 12/01/2021    MCHC 32.7 12/01/2021    RDW 14.7 12/01/2021     12/01/2021    MPV 10.1 04/08/2015     Lipids:  Lab Results   Component Value Date    CHOL 169 11/15/2021     Lab Results   Component Value Date    TRIG 197 (H) 11/15/2021     Lab Results   Component Value Date    HDL 43 11/15/2021     Lab Results   Component Value Date    LDLCALC 87 11/15/2021     No results found for: LABVLDL, VLDL  No results found for: CHOLHDLRATIO  CMP:    Lab Results   Component Value Date     12/01/2021    K 3.9 12/01/2021    CL 98 12/01/2021    CO2 27 12/01/2021    BUN 13 12/01/2021    CREATININE 0.74 12/01/2021    CREATININE 0.8 12/01/2021    GFRAA >60.0 12/01/2021    LABGLOM >60.0 12/01/2021    GLUCOSE 164 12/01/2021    PROT 7.6 12/01/2021    LABALBU 4.5 12/01/2021    CALCIUM 9.7 12/01/2021    BILITOT 0.3 12/01/2021    ALKPHOS 101 12/01/2021    AST 19 12/01/2021    ALT 31 12/01/2021     BMP:    Lab Results   Component Value Date     12/01/2021    K 3.9 12/01/2021    CL 98 12/01/2021    CO2 27 12/01/2021    BUN 13 12/01/2021    LABALBU 4.5 12/01/2021    CREATININE 0.74 12/01/2021    CREATININE 0.8 12/01/2021    CALCIUM 9.7 12/01/2021    GFRAA >60.0 12/01/2021    LABGLOM >60.0 12/01/2021    GLUCOSE 164 12/01/2021     Magnesium:    Lab Results   Component Value Date    MG 2.9 11/22/2021     TSH:  Lab Results   Component Value Date    TSH 3.230 02/05/2019             Patient Active Problem List   Diagnosis    Diverticulosis    Irregular periods    Uterine leiomyoma    Acne    Acute gastritis    Generalized abdominal pain    DUB (dysfunctional uterine bleeding)    Tobacco abuse    Acute myocardial infarction ([facility] [address]    Carotid occlusion, right    Hyperperfusion syndrome, status post carotid endarterectomy    Cerebrovascular accident (CVA) due to stenosis of left middle cerebral artery (HCC)    Presence of internal carotid stent    Callus    Inflamed seborrheic keratosis    Pain of toe of right foot    Rosacea       Medications Discontinued During This Encounter   Medication Reason    lisinopril-hydroCHLOROthiazide (PRINZIDE;ZESTORETIC) 20-25 MG per tablet        Modified Medications    No medications on file       Orders Placed This Encounter   Medications    lisinopril (PRINIVIL;ZESTRIL) 5 MG tablet     Sig: Take 1 tablet by mouth daily     Dispense:  90 tablet     Refill:  3        Assessment/Plan:    1. Coronary artery disease involving native coronary artery of native heart without angina pectoris  Stable no angina. continue meds    2. Essential hypertension  BP low. DC Lisinopril/HCTZ and will start Lisinopril 5 qd. 3. Dyslipidemia  Resume Atorvastatin at lower dose of 40 qd. rechck labs. Low fat diet. - Lipid, Fasting; Future    4. ST elevation myocardial infarction involving right coronary artery ([facility] [address]  5.  Left Carotid stent- stable. Counseling:  Heart Healthy Lifestyle, Improve BMI, Low Salt Diet, Take Precautions to Prevent Falls and Walk Daily    Return in about 3 months (around 5/23/2022).     Electronically signed by Barry Hassan MD on 2/23/2022 at 3:32 PM

## 2022-02-25 ENCOUNTER — HOSPITAL ENCOUNTER (OUTPATIENT)
Dept: WOMENS IMAGING | Age: 52
Discharge: HOME | End: 2022-02-27
Payer: COMMERCIAL

## 2022-02-25 VITALS — BODY MASS INDEX: 28.15 KG/M2 | HEIGHT: 64 IN

## 2022-02-25 DIAGNOSIS — Z12.31 SCREENING MAMMOGRAM, ENCOUNTER FOR: ICD-10-CM

## 2022-02-25 PROCEDURE — 77067 SCR MAMMO BI INCL CAD: CPT

## 2022-05-23 ENCOUNTER — OFFICE VISIT (OUTPATIENT)
Dept: CARDIOLOGY CLINIC | Age: 52
End: 2022-05-23
Payer: COMMERCIAL

## 2022-05-23 VITALS
HEART RATE: 76 BPM | DIASTOLIC BLOOD PRESSURE: 70 MMHG | BODY MASS INDEX: 28.84 KG/M2 | WEIGHT: 168 LBS | SYSTOLIC BLOOD PRESSURE: 130 MMHG | OXYGEN SATURATION: 98 %

## 2022-05-23 DIAGNOSIS — I25.10 CORONARY ARTERY DISEASE INVOLVING NATIVE CORONARY ARTERY OF NATIVE HEART WITHOUT ANGINA PECTORIS: ICD-10-CM

## 2022-05-23 DIAGNOSIS — I21.11 ST ELEVATION MYOCARDIAL INFARCTION INVOLVING RIGHT CORONARY ARTERY (HCC): ICD-10-CM

## 2022-05-23 DIAGNOSIS — E78.5 HYPERLIPIDEMIA, UNSPECIFIED HYPERLIPIDEMIA TYPE: ICD-10-CM

## 2022-05-23 DIAGNOSIS — I10 ESSENTIAL HYPERTENSION: ICD-10-CM

## 2022-05-23 DIAGNOSIS — G45.9 TIA (TRANSIENT ISCHEMIC ATTACK): ICD-10-CM

## 2022-05-23 DIAGNOSIS — Z95.828 HISTORY OF LEFT COMMON CAROTID ARTERY STENT PLACEMENT: ICD-10-CM

## 2022-05-23 DIAGNOSIS — Z98.890 HISTORY OF LEFT COMMON CAROTID ARTERY STENT PLACEMENT: ICD-10-CM

## 2022-05-23 DIAGNOSIS — E78.5 DYSLIPIDEMIA: Primary | ICD-10-CM

## 2022-05-23 DIAGNOSIS — I65.23 BILATERAL CAROTID ARTERY STENOSIS: ICD-10-CM

## 2022-05-23 DIAGNOSIS — I65.22 STENOSIS OF LEFT CAROTID ARTERY: ICD-10-CM

## 2022-05-23 PROCEDURE — 99214 OFFICE O/P EST MOD 30 MIN: CPT | Performed by: INTERNAL MEDICINE

## 2022-05-23 RX ORDER — ASPIRIN 81 MG/1
81 TABLET, CHEWABLE ORAL DAILY
Qty: 90 TABLET | Refills: 3 | Status: SHIPPED | OUTPATIENT
Start: 2022-05-23

## 2022-05-23 RX ORDER — CARVEDILOL 3.12 MG/1
TABLET ORAL
Qty: 180 TABLET | Refills: 3 | Status: SHIPPED | OUTPATIENT
Start: 2022-05-23 | End: 2022-09-27 | Stop reason: SDUPTHER

## 2022-05-23 RX ORDER — NITROGLYCERIN 0.4 MG/1
TABLET SUBLINGUAL
Qty: 25 TABLET | Refills: 3 | Status: SHIPPED | OUTPATIENT
Start: 2022-05-23 | End: 2022-09-27 | Stop reason: SDUPTHER

## 2022-05-23 RX ORDER — VITAMIN B COMPLEX
1 CAPSULE ORAL DAILY
Qty: 90 CAPSULE | Refills: 3 | Status: SHIPPED | OUTPATIENT
Start: 2022-05-23 | End: 2022-09-27 | Stop reason: SDUPTHER

## 2022-05-23 RX ORDER — ATORVASTATIN CALCIUM 80 MG/1
40 TABLET, FILM COATED ORAL NIGHTLY
Qty: 90 TABLET | Refills: 3 | Status: SHIPPED | OUTPATIENT
Start: 2022-05-23 | End: 2022-09-27 | Stop reason: SDUPTHER

## 2022-05-23 RX ORDER — ASCORBIC ACID 500 MG
500 TABLET ORAL DAILY
Qty: 90 TABLET | Refills: 3 | Status: SHIPPED | OUTPATIENT
Start: 2022-05-23 | End: 2022-09-27 | Stop reason: SDUPTHER

## 2022-05-23 RX ORDER — CLOPIDOGREL BISULFATE 75 MG/1
75 TABLET ORAL DAILY
Qty: 90 TABLET | Refills: 3 | Status: SHIPPED | OUTPATIENT
Start: 2022-05-23 | End: 2022-09-27 | Stop reason: SDUPTHER

## 2022-05-23 ASSESSMENT — ENCOUNTER SYMPTOMS
CHEST TIGHTNESS: 0
RESPIRATORY NEGATIVE: 1
EYES NEGATIVE: 1
STRIDOR: 0
COUGH: 0
BLOOD IN STOOL: 0
NAUSEA: 0
WHEEZING: 0
SHORTNESS OF BREATH: 0
GASTROINTESTINAL NEGATIVE: 1

## 2022-05-23 NOTE — PROGRESS NOTES
OFFICE VISIT        Patient: Kellie Ford  YOB: 1970  MRN: [id number]    Chief Complaint: STEMI HTN HPL   Chief Complaint   Patient presents with    Coronary Artery Disease    Hypertension       CV Data:  10/21 LE Art - negative   8/221 Echo EF 55-60 Trace MR  8/25/21 STEMI RCA Px ABELARDO. CX distal 70-75%   35/29 CUS % LICA >33   49/60/15 Left Carotid stent. %    Subjective/HPI Dr. Niall Glover pt changing due to insurance. Pt has had rare pressure when working as a . These symptoms are different than her MI symptoms. Compliant with meds. Pt was taken off statin for unknown reason. 11/12/21 asymptomatic but has severe carotid dz. LICA >17% and LIU occluded. occ chest heaviness. No sob. 46/68/20 had LICA Stent did well. Recent HTN and went to ER. Has been stable. No more headaches. No cp some sob but no worse. 11//30/21 called in due to uncontrolled BP worsea t night. No cp but occ heavy feeling. No sob. Little puffy under her eye. No bleed. 12/27/21 in ER for Stroke like symptoms. No cp no sob no falls no bleed    2/23/22 still has rash thought to be related to HCTZ. BP running low little woozy. No cp no sob no falls no bleed    5/23/22 doing better no cp no sob takes meds. Work -   Former smoker- quit 8/2021. occ etoh  ++FH  Lives with sister.         EKG:    Past Medical History:   Diagnosis Date    Abnormal Pap smear of cervix     CAD (coronary artery disease)     Diverticular disease     Diverticulitis        Past Surgical History:   Procedure Laterality Date    CARDIAC SURGERY      stents (2)    DILATION AND CURETTAGE         Family History   Problem Relation Age of Onset    No Known Problems Father     No Known Problems Mother     No Known Problems Paternal Grandfather     No Known Problems Paternal Grandmother     No Known Problems Maternal Grandmother     No Known Problems Maternal Grandfather     No Known Problems Brother Abused: Not on file    Physically Abused: Not on file    Sexually Abused: Not on file   Housing Stability:     Unable to Pay for Housing in the Last Year: Not on file    Number of Places Lived in the Last Year: Not on file    Unstable Housing in the Last Year: Not on file       Allergies   Allergen Reactions    Sulfa Antibiotics Rash       Current Outpatient Medications   Medication Sig Dispense Refill    atorvastatin (LIPITOR) 80 MG tablet Take 0.5 tablets by mouth nightly 90 tablet 3    carvedilol (COREG) 3.125 MG tablet TAKE 1 TABLET TWICE A  tablet 3    aspirin 81 MG chewable tablet Take 1 tablet by mouth daily 90 tablet 3    clopidogrel (PLAVIX) 75 MG tablet Take 1 tablet by mouth daily 90 tablet 3    nitroGLYCERIN (NITROSTAT) 0.4 MG SL tablet up to max of 3 total doses. If no relief after 1 dose, call 911. 25 tablet 3    ascorbic acid (VITAMIN C) 500 MG tablet Take 1 tablet by mouth daily 90 tablet 3    b complex vitamins capsule Take 1 capsule by mouth daily 90 capsule 3    lisinopril (PRINIVIL;ZESTRIL) 5 MG tablet Take 1 tablet by mouth daily 90 tablet 3    acetaminophen (TYLENOL) 500 MG tablet Take 2 tablets by mouth every 6 hours as needed for Pain or Fever 60 tablet 0     No current facility-administered medications for this visit. Review of Systems:   Review of Systems   Constitutional: Negative. Negative for diaphoresis and fatigue. HENT: Negative. Eyes: Negative. Respiratory: Negative. Negative for cough, chest tightness, shortness of breath, wheezing and stridor. Cardiovascular: Negative. Negative for chest pain, palpitations and leg swelling. Gastrointestinal: Negative. Negative for blood in stool and nausea. Genitourinary: Negative. Musculoskeletal: Negative. Skin: Negative. Neurological: Negative. Negative for dizziness, syncope, weakness and light-headedness. Hematological: Negative. Psychiatric/Behavioral: Negative.           Physical Examination:    /70 (Site: Left Upper Arm, Position: Sitting, Cuff Size: Large Adult)   Pulse 76   Wt 168 lb (76.2 kg)   SpO2 98%   BMI 28.84 kg/m²    Physical Exam   Constitutional: She appears healthy. No distress. HENT:   Normal cephalic and Atraumatic   Eyes: Pupils are equal, round, and reactive to light. Neck: Thyroid normal. No JVD present. No neck adenopathy. No thyromegaly present. Cardiovascular: Normal rate, regular rhythm, normal heart sounds, intact distal pulses and normal pulses. Pulmonary/Chest: Effort normal and breath sounds normal. She has no wheezes. She has no rales. She exhibits no tenderness. Abdominal: Soft. Bowel sounds are normal. There is no abdominal tenderness. Musculoskeletal:         General: No tenderness or edema. Normal range of motion. Cervical back: Normal range of motion and neck supple. Neurological: She is alert and oriented to person, place, and time. Skin: Skin is warm. No cyanosis. Nails show no clubbing.        LABS:  CBC:   Lab Results   Component Value Date    WBC 7.0 03/11/2022    RBC 4.88 03/11/2022    HGB 13.2 03/11/2022    HCT 40.2 03/11/2022    MCV 82.4 03/11/2022    MCH 27.0 03/11/2022    MCHC 32.8 03/11/2022    RDW 15.9 03/11/2022     03/11/2022    MPV 10.1 04/08/2015     Lipids:  Lab Results   Component Value Date    CHOL 144 03/11/2022    CHOL 169 11/15/2021     Lab Results   Component Value Date    TRIG 165 (H) 03/11/2022    TRIG 197 (H) 11/15/2021     Lab Results   Component Value Date    HDL 38 (L) 03/11/2022    HDL 43 11/15/2021     Lab Results   Component Value Date    LDLCALC 73 03/11/2022    LDLCALC 87 11/15/2021     No results found for: LABVLDL, VLDL  No results found for: CHOLHDLRATIO  CMP:    Lab Results   Component Value Date     03/11/2022    K 5.1 03/11/2022     03/11/2022    CO2 25 03/11/2022    BUN 10 03/11/2022    CREATININE 0.77 03/11/2022    GFRAA >60.0 03/11/2022    LABGLOM >60.0 03/11/2022 GLUCOSE 98 03/11/2022    PROT 7.6 03/11/2022    LABALBU 4.5 03/11/2022    CALCIUM 9.4 03/11/2022    BILITOT 0.4 03/11/2022    ALKPHOS 114 03/11/2022    AST 17 03/11/2022    ALT 29 03/11/2022     BMP:    Lab Results   Component Value Date     03/11/2022    K 5.1 03/11/2022     03/11/2022    CO2 25 03/11/2022    BUN 10 03/11/2022    LABALBU 4.5 03/11/2022    CREATININE 0.77 03/11/2022    CALCIUM 9.4 03/11/2022    GFRAA >60.0 03/11/2022    LABGLOM >60.0 03/11/2022    GLUCOSE 98 03/11/2022     Magnesium:    Lab Results   Component Value Date    MG 2.9 11/22/2021     TSH:  Lab Results   Component Value Date    TSH 3.230 02/05/2019             Patient Active Problem List   Diagnosis    Diverticulosis    Irregular periods    Uterine leiomyoma    Acne    Acute gastritis    Generalized abdominal pain    DUB (dysfunctional uterine bleeding)    Tobacco abuse    Acute myocardial infarction (HCC)    Carotid occlusion, right    Hyperperfusion syndrome, status post carotid endarterectomy    Cerebrovascular accident (CVA) due to stenosis of left middle cerebral artery (HCC)    Presence of internal carotid stent    Callus    Inflamed seborrheic keratosis    Pain of toe of right foot    Rosacea       Medications Discontinued During This Encounter   Medication Reason    aspirin 81 MG chewable tablet REORDER    nitroGLYCERIN (NITROSTAT) 0.4 MG SL tablet REORDER    clopidogrel (PLAVIX) 75 MG tablet REORDER    carvedilol (COREG) 3.125 MG tablet REORDER    ascorbic acid (VITAMIN C) 500 MG tablet REORDER    b complex vitamins capsule REORDER    atorvastatin (LIPITOR) 80 MG tablet REORDER       Modified Medications    Modified Medication Previous Medication    ASCORBIC ACID (VITAMIN C) 500 MG TABLET ascorbic acid (VITAMIN C) 500 MG tablet       Take 1 tablet by mouth daily    Take 500 mg by mouth daily    ASPIRIN 81 MG CHEWABLE TABLET aspirin 81 MG chewable tablet       Take 1 tablet by mouth daily Take 1 tablet by mouth daily    ATORVASTATIN (LIPITOR) 80 MG TABLET atorvastatin (LIPITOR) 80 MG tablet       Take 0.5 tablets by mouth nightly    Take 0.5 tablets by mouth nightly    B COMPLEX VITAMINS CAPSULE b complex vitamins capsule       Take 1 capsule by mouth daily    Take 1 capsule by mouth daily    CARVEDILOL (COREG) 3.125 MG TABLET carvedilol (COREG) 3.125 MG tablet       TAKE 1 TABLET TWICE A DAY    TAKE 1 TABLET TWICE A DAY    CLOPIDOGREL (PLAVIX) 75 MG TABLET clopidogrel (PLAVIX) 75 MG tablet       Take 1 tablet by mouth daily    Take 1 tablet by mouth daily    NITROGLYCERIN (NITROSTAT) 0.4 MG SL TABLET nitroGLYCERIN (NITROSTAT) 0.4 MG SL tablet       up to max of 3 total doses. If no relief after 1 dose, call 911.    up to max of 3 total doses. If no relief after 1 dose, call 911. Orders Placed This Encounter   Medications    atorvastatin (LIPITOR) 80 MG tablet     Sig: Take 0.5 tablets by mouth nightly     Dispense:  90 tablet     Refill:  3    carvedilol (COREG) 3.125 MG tablet     Sig: TAKE 1 TABLET TWICE A DAY     Dispense:  180 tablet     Refill:  3    aspirin 81 MG chewable tablet     Sig: Take 1 tablet by mouth daily     Dispense:  90 tablet     Refill:  3    clopidogrel (PLAVIX) 75 MG tablet     Sig: Take 1 tablet by mouth daily     Dispense:  90 tablet     Refill:  3    nitroGLYCERIN (NITROSTAT) 0.4 MG SL tablet     Sig: up to max of 3 total doses. If no relief after 1 dose, call 911. Dispense:  25 tablet     Refill:  3    ascorbic acid (VITAMIN C) 500 MG tablet     Sig: Take 1 tablet by mouth daily     Dispense:  90 tablet     Refill:  3    b complex vitamins capsule     Sig: Take 1 capsule by mouth daily     Dispense:  90 capsule     Refill:  3        Assessment/Plan:    1. Coronary artery disease involving native coronary artery of native heart without angina pectoris  Stable no angina. continue meds    2. Essential hypertension  BP low.  DC Lisinopril/HCTZ and will start Lisinopril 5 qd. 3. Dyslipidemia  Resume Atorvastatin at lower dose of 40 qd. rechck labs. Low fat diet. - stable profile reviewed with meds. - Lipid, Fasting; Future    4. ST elevation myocardial infarction involving right coronary artery ([address]  5. Left Carotid stent- stable. Counseling:  Heart Healthy Lifestyle, Improve BMI, Low Salt Diet, Take Precautions to Prevent Falls and Walk Daily    Return in about 4 months (around 9/23/2022).     Electronically signed by Mynor Moreno MD on 5/23/2022 at 3:07 PM

## 2022-08-12 ENCOUNTER — TELEPHONE (OUTPATIENT)
Dept: GASTROENTEROLOGY | Age: 52
End: 2022-08-12

## 2022-09-27 ENCOUNTER — OFFICE VISIT (OUTPATIENT)
Dept: CARDIOLOGY CLINIC | Age: 52
End: 2022-09-27
Payer: COMMERCIAL

## 2022-09-27 VITALS
SYSTOLIC BLOOD PRESSURE: 126 MMHG | DIASTOLIC BLOOD PRESSURE: 82 MMHG | BODY MASS INDEX: 28.84 KG/M2 | HEART RATE: 73 BPM | OXYGEN SATURATION: 99 % | WEIGHT: 168 LBS

## 2022-09-27 DIAGNOSIS — I10 ESSENTIAL HYPERTENSION: ICD-10-CM

## 2022-09-27 DIAGNOSIS — E78.5 HYPERLIPIDEMIA, UNSPECIFIED HYPERLIPIDEMIA TYPE: ICD-10-CM

## 2022-09-27 DIAGNOSIS — I65.22 STENOSIS OF LEFT CAROTID ARTERY: ICD-10-CM

## 2022-09-27 DIAGNOSIS — Z00.00 PE (PHYSICAL EXAM), ANNUAL: Primary | ICD-10-CM

## 2022-09-27 DIAGNOSIS — I65.23 BILATERAL CAROTID ARTERY STENOSIS: ICD-10-CM

## 2022-09-27 DIAGNOSIS — G45.9 TIA (TRANSIENT ISCHEMIC ATTACK): ICD-10-CM

## 2022-09-27 DIAGNOSIS — I21.11 ST ELEVATION MYOCARDIAL INFARCTION INVOLVING RIGHT CORONARY ARTERY (HCC): ICD-10-CM

## 2022-09-27 DIAGNOSIS — I25.10 CORONARY ARTERY DISEASE INVOLVING NATIVE CORONARY ARTERY OF NATIVE HEART WITHOUT ANGINA PECTORIS: ICD-10-CM

## 2022-09-27 DIAGNOSIS — E78.5 DYSLIPIDEMIA: ICD-10-CM

## 2022-09-27 DIAGNOSIS — Z98.890 HISTORY OF LEFT COMMON CAROTID ARTERY STENT PLACEMENT: ICD-10-CM

## 2022-09-27 DIAGNOSIS — Z95.828 HISTORY OF LEFT COMMON CAROTID ARTERY STENT PLACEMENT: ICD-10-CM

## 2022-09-27 PROCEDURE — 99214 OFFICE O/P EST MOD 30 MIN: CPT | Performed by: INTERNAL MEDICINE

## 2022-09-27 PROCEDURE — 93000 ELECTROCARDIOGRAM COMPLETE: CPT | Performed by: INTERNAL MEDICINE

## 2022-09-27 RX ORDER — ASCORBIC ACID 500 MG
500 TABLET ORAL DAILY
Qty: 90 TABLET | Refills: 3 | Status: SHIPPED | OUTPATIENT
Start: 2022-09-27

## 2022-09-27 RX ORDER — VITAMIN B COMPLEX
1 CAPSULE ORAL DAILY
Qty: 90 CAPSULE | Refills: 3 | Status: SHIPPED | OUTPATIENT
Start: 2022-09-27

## 2022-09-27 RX ORDER — CLOPIDOGREL BISULFATE 75 MG/1
75 TABLET ORAL DAILY
Qty: 90 TABLET | Refills: 3 | Status: SHIPPED | OUTPATIENT
Start: 2022-09-27

## 2022-09-27 RX ORDER — NITROGLYCERIN 0.4 MG/1
TABLET SUBLINGUAL
Qty: 25 TABLET | Refills: 3 | Status: SHIPPED | OUTPATIENT
Start: 2022-09-27

## 2022-09-27 RX ORDER — CARVEDILOL 3.12 MG/1
TABLET ORAL
Qty: 180 TABLET | Refills: 3 | Status: SHIPPED | OUTPATIENT
Start: 2022-09-27

## 2022-09-27 RX ORDER — LISINOPRIL 5 MG/1
5 TABLET ORAL DAILY
Qty: 90 TABLET | Refills: 3 | Status: SHIPPED | OUTPATIENT
Start: 2022-09-27

## 2022-09-27 RX ORDER — ATORVASTATIN CALCIUM 80 MG/1
40 TABLET, FILM COATED ORAL NIGHTLY
Qty: 90 TABLET | Refills: 3 | Status: SHIPPED | OUTPATIENT
Start: 2022-09-27

## 2022-09-27 ASSESSMENT — ENCOUNTER SYMPTOMS
WHEEZING: 0
NAUSEA: 0
SHORTNESS OF BREATH: 0
RESPIRATORY NEGATIVE: 1
CHEST TIGHTNESS: 0
STRIDOR: 0
EYES NEGATIVE: 1
BLOOD IN STOOL: 0
GASTROINTESTINAL NEGATIVE: 1
COUGH: 0

## 2022-11-01 ENCOUNTER — HOSPITAL ENCOUNTER (OUTPATIENT)
Dept: ULTRASOUND IMAGING | Age: 52
Discharge: HOME | End: 2022-11-03
Payer: COMMERCIAL

## 2022-11-01 DIAGNOSIS — I65.23 BILATERAL CAROTID ARTERY STENOSIS: ICD-10-CM

## 2022-11-01 PROCEDURE — 93880 EXTRACRANIAL BILAT STUDY: CPT | Performed by: INTERNAL MEDICINE

## 2022-11-01 PROCEDURE — 93880 EXTRACRANIAL BILAT STUDY: CPT

## 2023-01-24 ENCOUNTER — OFFICE VISIT (OUTPATIENT)
Dept: CARDIOLOGY CLINIC | Age: 53
End: 2023-01-24
Payer: COMMERCIAL

## 2023-01-24 VITALS
WEIGHT: 169.6 LBS | HEART RATE: 66 BPM | OXYGEN SATURATION: 100 % | DIASTOLIC BLOOD PRESSURE: 84 MMHG | BODY MASS INDEX: 29.11 KG/M2 | SYSTOLIC BLOOD PRESSURE: 130 MMHG

## 2023-01-24 DIAGNOSIS — I25.10 CORONARY ARTERY DISEASE INVOLVING NATIVE CORONARY ARTERY OF NATIVE HEART WITHOUT ANGINA PECTORIS: ICD-10-CM

## 2023-01-24 DIAGNOSIS — E78.5 HYPERLIPIDEMIA, UNSPECIFIED HYPERLIPIDEMIA TYPE: ICD-10-CM

## 2023-01-24 DIAGNOSIS — Z98.890 HISTORY OF LEFT COMMON CAROTID ARTERY STENT PLACEMENT: ICD-10-CM

## 2023-01-24 DIAGNOSIS — Z95.828 HISTORY OF LEFT COMMON CAROTID ARTERY STENT PLACEMENT: ICD-10-CM

## 2023-01-24 DIAGNOSIS — I65.22 STENOSIS OF LEFT CAROTID ARTERY: ICD-10-CM

## 2023-01-24 DIAGNOSIS — I10 ESSENTIAL HYPERTENSION: ICD-10-CM

## 2023-01-24 DIAGNOSIS — I65.23 BILATERAL CAROTID ARTERY STENOSIS: ICD-10-CM

## 2023-01-24 DIAGNOSIS — I21.11 ST ELEVATION MYOCARDIAL INFARCTION INVOLVING RIGHT CORONARY ARTERY (HCC): ICD-10-CM

## 2023-01-24 DIAGNOSIS — E78.5 DYSLIPIDEMIA: Primary | ICD-10-CM

## 2023-01-24 DIAGNOSIS — G45.9 TIA (TRANSIENT ISCHEMIC ATTACK): ICD-10-CM

## 2023-01-24 PROBLEM — R10.31 SEVERE RIGHT GROIN PAIN: Status: ACTIVE | Noted: 2023-01-24

## 2023-01-24 PROBLEM — I21.19 ACUTE MI, INFERIOR WALL (MULTI): Status: ACTIVE | Noted: 2023-01-24

## 2023-01-24 PROBLEM — K21.9 ESOPHAGEAL REFLUX: Status: ACTIVE | Noted: 2023-01-24

## 2023-01-24 PROBLEM — Z98.62 S/P ANGIOPLASTY: Status: ACTIVE | Noted: 2023-01-24

## 2023-01-24 PROBLEM — I21.19: Status: ACTIVE | Noted: 2023-01-24

## 2023-01-24 PROBLEM — Z95.5 STENTED CORONARY ARTERY: Status: ACTIVE | Noted: 2023-01-24

## 2023-01-24 PROBLEM — I65.21 OCCLUSION OF RIGHT CAROTID ARTERY: Status: ACTIVE | Noted: 2023-01-24

## 2023-01-24 PROBLEM — E87.5 HYPERKALEMIA: Status: ACTIVE | Noted: 2023-01-24

## 2023-01-24 PROBLEM — K59.09 CHRONIC CONSTIPATION: Status: ACTIVE | Noted: 2023-01-24

## 2023-01-24 PROBLEM — R21 RASH: Status: ACTIVE | Noted: 2023-01-24

## 2023-01-24 PROCEDURE — 3075F SYST BP GE 130 - 139MM HG: CPT | Performed by: INTERNAL MEDICINE

## 2023-01-24 PROCEDURE — 99214 OFFICE O/P EST MOD 30 MIN: CPT | Performed by: INTERNAL MEDICINE

## 2023-01-24 PROCEDURE — 3079F DIAST BP 80-89 MM HG: CPT | Performed by: INTERNAL MEDICINE

## 2023-01-24 RX ORDER — VITAMIN B COMPLEX
CAPSULE ORAL
COMMUNITY
Start: 2021-09-01

## 2023-01-24 RX ORDER — NITROGLYCERIN 0.4 MG/1
TABLET SUBLINGUAL
COMMUNITY
Start: 2021-08-26

## 2023-01-24 RX ORDER — ATORVASTATIN CALCIUM 40 MG/1
1 TABLET, FILM COATED ORAL DAILY
COMMUNITY
Start: 2021-08-26 | End: 2023-03-29 | Stop reason: WASHOUT

## 2023-01-24 RX ORDER — NAPROXEN SODIUM 220 MG/1
TABLET, FILM COATED ORAL
COMMUNITY
Start: 2021-08-26

## 2023-01-24 RX ORDER — CLOPIDOGREL BISULFATE 75 MG/1
1 TABLET ORAL DAILY
COMMUNITY
Start: 2021-08-26

## 2023-01-24 RX ORDER — ASCORBIC ACID 500 MG
TABLET ORAL
COMMUNITY
Start: 2021-09-01

## 2023-01-24 RX ORDER — LISINOPRIL 5 MG/1
1 TABLET ORAL DAILY
COMMUNITY
Start: 2022-02-23

## 2023-01-24 RX ORDER — CARVEDILOL 3.12 MG/1
1 TABLET ORAL
COMMUNITY
Start: 2021-09-01

## 2023-01-24 RX ORDER — WHEAT DEXTRIN 3 G/3.5 G
POWDER IN PACKET (EA) ORAL
COMMUNITY
Start: 2022-09-13 | End: 2024-03-25

## 2023-01-24 ASSESSMENT — ENCOUNTER SYMPTOMS
NAUSEA: 0
BLOOD IN STOOL: 0
COUGH: 0
RESPIRATORY NEGATIVE: 1
STRIDOR: 0
WHEEZING: 0
EYES NEGATIVE: 1
SHORTNESS OF BREATH: 0
CHEST TIGHTNESS: 0
GASTROINTESTINAL NEGATIVE: 1

## 2023-01-24 NOTE — PROGRESS NOTES
OFFICE VISIT        Patient: Twila Layton  YOB: 1970  MRN: [id number]    Chief Complaint: STEMI HTN HPL   Chief Complaint   Patient presents with    Follow-up     4 month       CV Data:  10/21 LE Art - negative   8/221 Echo EF 55-60 Trace MR  8/25/21 STEMI RCA Px ABELARDO. CX distal 70-75%   16/62 CUS % LICA >24   82/90/88 Left Carotid stent. %  61/55 CUS LICA %     Subjective/HPI Dr. Isadora Trujillo pt changing due to insurance. Pt has had rare pressure when working as a . These symptoms are different than her MI symptoms. Compliant with meds. Pt was taken off statin for unknown reason. 11/12/21 asymptomatic but has severe carotid dz. LICA >66% and LIU occluded. occ chest heaviness. No sob. 52/23/15 had LICA Stent did well. Recent HTN and went to ER. Has been stable. No more headaches. No cp some sob but no worse. 11//30/21 called in due to uncontrolled BP worsea t night. No cp but occ heavy feeling. No sob. Little puffy under her eye. No bleed. 12/27/21 in ER for Stroke like symptoms. No cp no sob no falls no bleed    2/23/22 still has rash thought to be related to HCTZ. BP running low little woozy. No cp no sob no falls no bleed    5/23/22 doing better no cp no sob takes meds. 9/27/22 doing well no cp no sob rare LH no falls no bleed. 1/24/23 doing well walking no cp no sob no falls no bleed. 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 Work -   Former smoker- quit 8/2021. occ etoh  ++FH  Lives with sister.         EKG: SR 71    Past Medical History:   Diagnosis Date    Abnormal Pap smear of cervix     CAD (coronary artery disease)     Diverticular disease     Diverticulitis        Past Surgical History:   Procedure Laterality Date    CARDIAC SURGERY      stents (2)    DILATION AND CURETTAGE         Family History   Problem Relation Age of Onset    No Known Problems Father     No Known Problems Mother     No Known Problems Paternal Grandfather     No Known Problems Paternal Grandmother     No Known Problems Maternal Grandmother     No Known Problems Maternal Grandfather     No Known Problems Brother     Breast Cancer Sister     No Known Problems Other     Breast Cancer Paternal Aunt     Cancer Neg Hx     Colon Cancer Neg Hx     Diabetes Neg Hx     Eclampsia Neg Hx     Hypertension Neg Hx     Ovarian Cancer Neg Hx      Labor Neg Hx     Spont Abortions Neg Hx     Stroke Neg Hx        Social History     Socioeconomic History    Marital status: Single     Spouse name: None    Number of children: None    Years of education: None    Highest education level: None   Tobacco Use    Smoking status: Former     Packs/day: 0.25     Types: Cigarettes     Quit date: 10/25/2021     Years since quittin.2    Smokeless tobacco: Never   Substance and Sexual Activity    Alcohol use: Yes     Comment: socially    Drug use: No    Sexual activity: Not Currently       Allergies   Allergen Reactions    Sulfa Antibiotics Rash       Current Outpatient Medications   Medication Sig Dispense Refill    nitroGLYCERIN (NITROSTAT) 0.4 MG SL tablet up to max of 3 total doses. If no relief after 1 dose, call 911. 25 tablet 3    lisinopril (PRINIVIL;ZESTRIL) 5 MG tablet Take 1 tablet by mouth daily 90 tablet 3    clopidogrel (PLAVIX) 75 MG tablet Take 1 tablet by mouth daily 90 tablet 3    carvedilol (COREG) 3.125 MG tablet TAKE 1 TABLET TWICE A  tablet 3    atorvastatin (LIPITOR) 80 MG tablet Take 0.5 tablets by mouth nightly 90 tablet 3    b complex vitamins capsule Take 1 capsule by mouth daily 90 capsule 3    ascorbic acid (VITAMIN C) 500 MG tablet Take 1 tablet by mouth daily 90 tablet 3    aspirin 81 MG chewable tablet Take 1 tablet by mouth daily 90 tablet 3    acetaminophen (TYLENOL) 500 MG tablet Take 2 tablets by mouth every 6 hours as needed for Pain or Fever 60 tablet 0     No current facility-administered medications for this visit.        Review of Systems:   Review of Systems   Constitutional: Negative. Negative for diaphoresis and fatigue. HENT: Negative. Eyes: Negative. Respiratory: Negative. Negative for cough, chest tightness, shortness of breath, wheezing and stridor. Cardiovascular: Negative. Negative for chest pain, palpitations and leg swelling. Gastrointestinal: Negative. Negative for blood in stool and nausea. Genitourinary: Negative. Musculoskeletal: Negative. Skin: Negative. Neurological: Negative. Negative for dizziness, syncope, weakness and light-headedness. Hematological: Negative. Psychiatric/Behavioral: Negative. Physical Examination:    /84 (Site: Left Upper Arm, Position: Sitting, Cuff Size: Medium Adult)   Pulse 66   Wt 169 lb 9.6 oz (76.9 kg)   SpO2 100%   BMI 29.11 kg/m²    Physical Exam   Constitutional: She appears healthy. No distress. HENT:   Normal cephalic and Atraumatic   Eyes: Pupils are equal, round, and reactive to light. Neck: Thyroid normal. No JVD present. No neck adenopathy. No thyromegaly present. Cardiovascular: Normal rate, regular rhythm, normal heart sounds, intact distal pulses and normal pulses. Pulmonary/Chest: Effort normal and breath sounds normal. She has no wheezes. She has no rales. She exhibits no tenderness. Abdominal: Soft. Bowel sounds are normal. There is no abdominal tenderness. Musculoskeletal:         General: No tenderness or edema. Normal range of motion. Cervical back: Normal range of motion and neck supple. Neurological: She is alert and oriented to person, place, and time. Skin: Skin is warm. No cyanosis. Nails show no clubbing.      LABS:  CBC:   Lab Results   Component Value Date/Time    WBC 7.0 03/11/2022 01:43 PM    RBC 4.88 03/11/2022 01:43 PM    HGB 13.2 03/11/2022 01:43 PM    HCT 40.2 03/11/2022 01:43 PM    MCV 82.4 03/11/2022 01:43 PM    MCH 27.0 03/11/2022 01:43 PM    MCHC 32.8 03/11/2022 01:43 PM    RDW 15.9 03/11/2022 01:43 PM     03/11/2022 01:43 PM    MPV 10.1 04/08/2015 12:00 AM     Lipids:  Lab Results   Component Value Date    CHOL 144 03/11/2022    CHOL 169 11/15/2021     Lab Results   Component Value Date    TRIG 165 (H) 03/11/2022    TRIG 197 (H) 11/15/2021     Lab Results   Component Value Date    HDL 38 (L) 03/11/2022    HDL 43 11/15/2021     Lab Results   Component Value Date    LDLCALC 73 03/11/2022    LDLCALC 87 11/15/2021     No results found for: LABVLDL, VLDL  No results found for: CHOLHDLRATIO  CMP:    Lab Results   Component Value Date/Time     09/26/2022 03:01 PM    K 4.7 09/26/2022 03:01 PM     09/26/2022 03:01 PM    CO2 28 09/26/2022 03:01 PM    BUN 13 09/26/2022 03:01 PM    CREATININE 0.75 09/26/2022 03:01 PM    GFRAA >60.0 09/26/2022 03:01 PM    LABGLOM >60.0 09/26/2022 03:01 PM    GLUCOSE 94 09/26/2022 03:01 PM    PROT 7.6 03/11/2022 01:43 PM    LABALBU 4.5 03/11/2022 01:43 PM    CALCIUM 9.3 09/26/2022 03:01 PM    BILITOT 0.4 03/11/2022 01:43 PM    ALKPHOS 114 03/11/2022 01:43 PM    AST 17 03/11/2022 01:43 PM    ALT 29 03/11/2022 01:43 PM     BMP:    Lab Results   Component Value Date/Time     09/26/2022 03:01 PM    K 4.7 09/26/2022 03:01 PM     09/26/2022 03:01 PM    CO2 28 09/26/2022 03:01 PM    BUN 13 09/26/2022 03:01 PM    LABALBU 4.5 03/11/2022 01:43 PM    CREATININE 0.75 09/26/2022 03:01 PM    CALCIUM 9.3 09/26/2022 03:01 PM    GFRAA >60.0 09/26/2022 03:01 PM    LABGLOM >60.0 09/26/2022 03:01 PM    GLUCOSE 94 09/26/2022 03:01 PM     Magnesium:    Lab Results   Component Value Date/Time    MG 2.9 11/22/2021 08:30 PM     TSH:  Lab Results   Component Value Date    TSH 3.230 02/05/2019             Patient Active Problem List   Diagnosis    Diverticulosis    Irregular periods    Uterine leiomyoma    Acne    Acute gastritis    Generalized abdominal pain    DUB (dysfunctional uterine bleeding)    Tobacco abuse    Acute myocardial infarction [facility])    Carotid occlusion, right    Hyperperfusion syndrome, status post carotid endarterectomy    Cerebrovascular accident (CVA) due to stenosis of left middle cerebral artery (HCC)    Presence of internal carotid stent    Callus    Inflamed seborrheic keratosis    Pain of toe of right foot    Rosacea       There are no discontinued medications. Modified Medications    No medications on file       No orders of the defined types were placed in this encounter. Assessment/Plan:    1. Coronary artery disease involving native coronary artery of native heart without angina pectoris  Stable no angina. continue meds    2. Essential hypertension  BP low. DC Lisinopril/HCTZ and will start Lisinopril 5 qd. 3. Dyslipidemia  Resume Atorvastatin at lower dose of 40 qd. rechck labs. Low fat diet. - stable profile reviewed with meds. - Lipid, Fasting; Future    4. ST elevation myocardial infarction involving right coronary artery ([address]  5. Left Carotid stent- stable. - f/u CUS - stable       Counseling:  Heart Healthy Lifestyle, Improve BMI, Low Salt Diet, Take Precautions to Prevent Falls and Walk Daily    Return in about 4 months (around 5/24/2023).     Electronically signed by Tari Silvestre MD on 1/24/2023 at 3:32 PM

## 2023-03-02 ENCOUNTER — OFFICE VISIT (OUTPATIENT)
Dept: OBGYN CLINIC | Age: 53
End: 2023-03-02
Payer: COMMERCIAL

## 2023-03-02 VITALS
WEIGHT: 166 LBS | SYSTOLIC BLOOD PRESSURE: 124 MMHG | HEIGHT: 64 IN | DIASTOLIC BLOOD PRESSURE: 74 MMHG | BODY MASS INDEX: 28.34 KG/M2

## 2023-03-02 DIAGNOSIS — Z01.419 WELL WOMAN EXAM WITH ROUTINE GYNECOLOGICAL EXAM: Primary | ICD-10-CM

## 2023-03-02 DIAGNOSIS — Z12.31 BREAST CANCER SCREENING BY MAMMOGRAM: ICD-10-CM

## 2023-03-02 PROCEDURE — 99396 PREV VISIT EST AGE 40-64: CPT | Performed by: OBSTETRICS & GYNECOLOGY

## 2023-03-02 ASSESSMENT — ENCOUNTER SYMPTOMS
EYES NEGATIVE: 1
BLOOD IN STOOL: 0
NAUSEA: 0
ALLERGIC/IMMUNOLOGIC NEGATIVE: 1
DIARRHEA: 0
ABDOMINAL PAIN: 0
RECTAL PAIN: 0
ABDOMINAL DISTENTION: 0
VOMITING: 0
RESPIRATORY NEGATIVE: 1
CONSTIPATION: 0
ANAL BLEEDING: 0

## 2023-03-02 ASSESSMENT — VISUAL ACUITY: OU: 1

## 2023-03-02 NOTE — PROGRESS NOTES
Subjective:      Patient ID: Jonatan Flores is a 46 y.o. female    Annual exam.  Reviewed medical, surgical, social and family history. Also reviewed current medications and allergies. No PMB. No GYN complaints. Pap deferred ( negative co-testing 2021; next pap 2024 ). Screening mammogram ordered. Monthly SBE encouraged. Screening colonoscopy recommended per routine. F/U annual exam or prn. Vitals:  Ht 5' 4\" (1.626 m)   Wt 166 lb (75.3 kg)   BMI 28.49 kg/m²   Past Medical History:   Diagnosis Date    Abnormal Pap smear of cervix     CAD (coronary artery disease)     Diverticular disease     Diverticulitis      Past Surgical History:   Procedure Laterality Date    CARDIAC SURGERY      stents (2)    DILATION AND CURETTAGE       Allergies:  Sulfa antibiotics  Current Outpatient Medications   Medication Sig Dispense Refill    nitroGLYCERIN (NITROSTAT) 0.4 MG SL tablet up to max of 3 total doses. If no relief after 1 dose, call 911. 25 tablet 3    lisinopril (PRINIVIL;ZESTRIL) 5 MG tablet Take 1 tablet by mouth daily 90 tablet 3    clopidogrel (PLAVIX) 75 MG tablet Take 1 tablet by mouth daily 90 tablet 3    carvedilol (COREG) 3.125 MG tablet TAKE 1 TABLET TWICE A  tablet 3    atorvastatin (LIPITOR) 80 MG tablet Take 0.5 tablets by mouth nightly 90 tablet 3    b complex vitamins capsule Take 1 capsule by mouth daily 90 capsule 3    ascorbic acid (VITAMIN C) 500 MG tablet Take 1 tablet by mouth daily 90 tablet 3    aspirin 81 MG chewable tablet Take 1 tablet by mouth daily 90 tablet 3    acetaminophen (TYLENOL) 500 MG tablet Take 2 tablets by mouth every 6 hours as needed for Pain or Fever 60 tablet 0     No current facility-administered medications for this visit.      Social History     Socioeconomic History    Marital status: Single     Spouse name: Not on file    Number of children: Not on file    Years of education: Not on file    Highest education level: Not on file   Occupational History Not on file   Tobacco Use    Smoking status: Former     Packs/day: 0.25     Types: Cigarettes     Quit date: 10/25/2021     Years since quittin.3    Smokeless tobacco: Never   Vaping Use    Vaping Use: Never used   Substance and Sexual Activity    Alcohol use: Yes     Comment: socially    Drug use: No    Sexual activity: Not Currently   Other Topics Concern    Not on file   Social History Narrative    Not on file     Social Determinants of Health     Financial Resource Strain: Not on file   Food Insecurity: Not on file   Transportation Needs: Not on file   Physical Activity: Not on file   Stress: Not on file   Social Connections: Not on file   Intimate Partner Violence: Not on file   Housing Stability: Not on file     Family History   Problem Relation Age of Onset    No Known Problems Father     No Known Problems Mother     No Known Problems Paternal Grandfather     No Known Problems Paternal Grandmother     No Known Problems Maternal Grandmother     No Known Problems Maternal Grandfather     No Known Problems Brother     Breast Cancer Sister     No Known Problems Other     Breast Cancer Paternal Aunt     Cancer Neg Hx     Colon Cancer Neg Hx     Diabetes Neg Hx     Eclampsia Neg Hx     Hypertension Neg Hx     Ovarian Cancer Neg Hx      Labor Neg Hx     Spont Abortions Neg Hx     Stroke Neg Hx        Review of Systems   Constitutional: Negative. Negative for activity change, appetite change, chills, diaphoresis, fatigue, fever and unexpected weight change. HENT: Negative. Eyes: Negative. Respiratory: Negative. Cardiovascular: Negative. Gastrointestinal:  Negative for abdominal distention, abdominal pain, anal bleeding, blood in stool, constipation, diarrhea, nausea, rectal pain and vomiting. Endocrine: Negative.     Genitourinary:  Negative for decreased urine volume, difficulty urinating, dyspareunia, dysuria, enuresis, flank pain, frequency, genital sores, hematuria, menstrual problem, pelvic pain, urgency, vaginal bleeding, vaginal discharge and vaginal pain. Musculoskeletal: Negative. Skin: Negative. Allergic/Immunologic: Negative. Neurological: Negative. Hematological: Negative. Psychiatric/Behavioral: Negative. Objective:     Physical Exam  Constitutional:       Appearance: She is well-developed. HENT:      Head: Normocephalic. Eyes:      General: Lids are normal. Vision grossly intact. Neck:      Thyroid: No thyromegaly. Cardiovascular:      Rate and Rhythm: Normal rate and regular rhythm. Heart sounds: Normal heart sounds. Pulmonary:      Effort: Pulmonary effort is normal. No respiratory distress. Breath sounds: Normal breath sounds. No wheezing or rales. Chest:      Chest wall: No tenderness. Breasts:     Right: Normal. No swelling, bleeding, inverted nipple, mass, nipple discharge, skin change or tenderness. Left: Normal. No swelling, bleeding, inverted nipple, mass, nipple discharge, skin change or tenderness. Abdominal:      General: There is no distension. Palpations: Abdomen is soft. There is no mass. Tenderness: There is no abdominal tenderness. There is no guarding or rebound. Hernia: No hernia is present. There is no hernia in the left inguinal area or right inguinal area. Genitourinary:     General: Normal vulva. Pubic Area: No rash. Labia:         Right: No rash, tenderness, lesion or injury. Left: No rash, tenderness, lesion or injury. Urethra: No prolapse, urethral swelling or urethral lesion. Vagina: Normal. No signs of injury and foreign body. No vaginal discharge, erythema, tenderness or bleeding. Cervix: No cervical motion tenderness, discharge or friability. Uterus: Not deviated, not enlarged, not fixed and not tender. Adnexa:         Right: No mass, tenderness or fullness. Left: No mass, tenderness or fullness.         Rectum: Normal. Musculoskeletal:         General: No tenderness. Normal range of motion. Cervical back: Normal range of motion and neck supple. Lymphadenopathy:      Cervical: No cervical adenopathy. Upper Body:      Right upper body: No supraclavicular or axillary adenopathy. Left upper body: No supraclavicular or axillary adenopathy. Lower Body: No right inguinal adenopathy. No left inguinal adenopathy. Skin:     General: Skin is warm and dry. Coloration: Skin is not pale. Findings: No erythema or rash. Neurological:      Mental Status: She is alert and oriented to person, place, and time. Psychiatric:         Behavior: Behavior normal.         Thought Content: Thought content normal.         Judgment: Judgment normal.       Assessment:       Diagnosis Orders   1. Well woman exam with routine gynecological exam        2. Breast cancer screening by mammogram  KARLA DIGITAL SCREEN W OR WO CAD BILATERAL           Plan:      Medications placedthis encounter:  No orders of the defined types were placed in this encounter. Orders placedthis encounter:  Orders Placed This Encounter   Procedures    KARLA DIGITAL SCREEN W OR WO CAD BILATERAL     Standing Status:   Future     Standing Expiration Date:   3/1/2024         Follow up:  Return in about 1 year (around 3/2/2024) for Annual.  Repeat Annual GYN exam every 1 year. Cervical Cytology exam starts age 24. If Negative Cytology;  follow-up screening per current guidelines. Mammograms yearly starting at age 36. Calcium and Vitamin D dosing reviewed ( age appropriate ). Colonoscopy and bone density screening discussed ( age appropriate ). Birth control and STD prevention discussed ( age appropriate ). Gardisil counseling completed for all patients ( age appropriate ). Routine health maintenance ( per PCP and guidelines ).

## 2023-03-03 ENCOUNTER — HOSPITAL ENCOUNTER (OUTPATIENT)
Dept: WOMENS IMAGING | Age: 53
Discharge: HOME | End: 2023-03-03
Payer: COMMERCIAL

## 2023-03-03 DIAGNOSIS — Z12.31 BREAST CANCER SCREENING BY MAMMOGRAM: ICD-10-CM

## 2023-03-03 PROCEDURE — 77067 SCR MAMMO BI INCL CAD: CPT

## 2023-03-07 NOTE — PROGRESS NOTES
"Subjective   Patient ID: Maria Fernanda Glasgow is a 52 y.o. female who presents for follow up.    HPI     Pt has HTN  She occasionally checks her BP at home with similar readings to office.  Denies chest pain, dizziness, LE swelling.     Patient has hyperlipidemia.  She tries to limit the amount of fatty foods and high cholesterol foods that are consumed.  Patient is compliant with stain therapy and denies any noted side effects.    Pt has CAD.  She had an MI 8/25/2021 and had 2 stents placed.  Pt does follow Dr. Jackson, cardiology, as well.  Denies chest pain, palpitations, abnormal bleeding or bruising.     She has carotid artery stenosis.  She was found to have 100% occlusion of the right carotid artery.  Left Carotid was stented 11/15/2022.    Review of Systems:  Constitutional: Patient denies any fever, chills, loss of appetite, or unexplained weight loss.  Cardiovascular: Patient denies any chest pain, shortness of breath with exertion, tachycardia, palpitations, orthopnea, or paroxysmal nocturnal dyspnea.  Respiratory: Patient denies any cough, shortness of breath, or wheezing.  Skin: Denies any rashes or skin lesions.  Neurology: Patient denies any new motor or sensory losses. Denies any numbness, tingling, weakness, and incoordination of the extremities. Patient also denies any tremor, seizures, or gait instability.  Endocrinology: Denies any polyuria, polydipsia, polyphagia, or heat/cold intolerance.  Psychiatric: Denies any anxiety, depression, or suicidal/homicidal ideation.  Hematology: Patient denies any abnormal bruising or bleeding.     SEE HISTORY OF PRESENT ILLNESS ALSO       Objective   /79 (BP Location: Right arm, Patient Position: Sitting, BP Cuff Size: Adult)   Pulse 69   Temp 36.6 °C (97.9 °F) (Temporal)   Resp 16   Ht 1.626 m (5' 4\")   Wt 74.8 kg (165 lb)   SpO2 97%   BMI 28.32 kg/m²     Physical Exam:  General Appearance: Alert and cooperative, in no acute distress, " well-developed/well-nourished overweight female.    Neck: Supple and without adenopathy or rigidity. There is no JVD at 90° and no carotid bruits are noted. There is no thyromegaly, thyroid tenderness, or palpable thyroid nodules.  Heart: Regular rate and rhythm without murmur or ectopy.  Respiratory: Lungs are clear to auscultation bilaterally with good air exchange. Good respiratory effort no accessory muscle use.  Skin: Good turgor, moist, warm and without rashes or lesions.  Neurological exam: Alert and oriented X3, no tremor, normal gait.  Extremities: No clubbing, cyanosis, or edema.     Assessment/Plan            HTN: Blood pressure appears adequately controlled and we will continue with the current antihypertensive therapy.     Hyperlipidemia: Stable based on March 2022 labs. Patient will continue with the current medication. Dietary changes, exercise, and maintenance of healthy weight were discussed at length.     Coronary artery disease:   No angina.  Patient had a MI 8/25/21 and underwent 2 stents (Dr. Parra).  Patient is without worrisome signs or symptoms for unstable angina.   Risk factor modification was discussed at length.   Dietary changes, exercise and maintenance of a healthy weight were discussed.  She follows with Dr. Jackson (cardiology) at [facility].     Left carotid artery stenosis / Right carotid artery occlusion: She had a left carotid artery stent on 11/15/21 and the RIGHT carotid artery was found to be 100% blocked.     Constipation: Pt has intermittent constipation with some blood streaking of the stool.  9/13/2022: Referred to GI specialist for further evaluation. Patient will likely need to undergo a colonoscopy in the future but would be recommended to remain on the Plavix for 1 full year following her carotid artery stenting. 11/15/2022 will be 1 year from her carotid artery stenting.  3/8/2023:  WE WILL CONTACT HER CARDIOLOGIST TO SEE WHEN THEY ARE COMFORTABLE WITH  HER STOPPING THE PLAVIX IN ORDER TO PROCEED WITH A COLONOSCOPY.       LAST COLONOSCOPY DONE BEFORE 2013    Scribe Attestation  By signing my name below, I, Bill Melchor , Scribe   attest that this documentation has been prepared under the direction and in the presence of Dr. Taylor.

## 2023-03-08 ENCOUNTER — OFFICE VISIT (OUTPATIENT)
Dept: PRIMARY CARE | Facility: CLINIC | Age: 53
End: 2023-03-08
Payer: COMMERCIAL

## 2023-03-08 ENCOUNTER — TELEPHONE (OUTPATIENT)
Dept: PRIMARY CARE | Facility: CLINIC | Age: 53
End: 2023-03-08

## 2023-03-08 VITALS
BODY MASS INDEX: 28.17 KG/M2 | DIASTOLIC BLOOD PRESSURE: 79 MMHG | HEART RATE: 69 BPM | OXYGEN SATURATION: 97 % | RESPIRATION RATE: 16 BRPM | HEIGHT: 64 IN | WEIGHT: 165 LBS | SYSTOLIC BLOOD PRESSURE: 128 MMHG | TEMPERATURE: 97.9 F

## 2023-03-08 DIAGNOSIS — I65.21 OCCLUSION OF RIGHT CAROTID ARTERY: ICD-10-CM

## 2023-03-08 DIAGNOSIS — I25.10 CORONARY ARTERY DISEASE INVOLVING NATIVE HEART WITHOUT ANGINA PECTORIS, UNSPECIFIED VESSEL OR LESION TYPE: ICD-10-CM

## 2023-03-08 DIAGNOSIS — I65.22 STENOSIS OF LEFT CAROTID ARTERY: ICD-10-CM

## 2023-03-08 DIAGNOSIS — I10 PRIMARY HYPERTENSION: Primary | ICD-10-CM

## 2023-03-08 DIAGNOSIS — E78.2 MIXED HYPERLIPIDEMIA: ICD-10-CM

## 2023-03-08 DIAGNOSIS — K59.09 CHRONIC CONSTIPATION: ICD-10-CM

## 2023-03-08 PROCEDURE — 3074F SYST BP LT 130 MM HG: CPT | Performed by: FAMILY MEDICINE

## 2023-03-08 PROCEDURE — 99214 OFFICE O/P EST MOD 30 MIN: CPT | Performed by: FAMILY MEDICINE

## 2023-03-08 PROCEDURE — 1036F TOBACCO NON-USER: CPT | Performed by: FAMILY MEDICINE

## 2023-03-08 PROCEDURE — 3078F DIAST BP <80 MM HG: CPT | Performed by: FAMILY MEDICINE

## 2023-03-08 ASSESSMENT — PATIENT HEALTH QUESTIONNAIRE - PHQ9
2. FEELING DOWN, DEPRESSED OR HOPELESS: NOT AT ALL
SUM OF ALL RESPONSES TO PHQ9 QUESTIONS 1 AND 2: 0
1. LITTLE INTEREST OR PLEASURE IN DOING THINGS: NOT AT ALL

## 2023-03-08 ASSESSMENT — PAIN SCALES - GENERAL: PAINLEVEL: 0-NO PAIN

## 2023-03-08 NOTE — TELEPHONE ENCOUNTER
----- Message from Kavon Taylor DO sent at 3/8/2023  5:23 PM EST -----  Please call her cardiologist to see if she can stop her Plavix in order to have a colonoscopy.    Dr. Jackson at [facility].

## 2023-03-09 ENCOUNTER — TELEPHONE (OUTPATIENT)
Dept: CARDIOLOGY CLINIC | Age: 53
End: 2023-03-09

## 2023-03-13 NOTE — TELEPHONE ENCOUNTER
Dr. Jackson office reports Doctor said she can hold Plavix for 7 days. Office said they already spoke to patient as well

## 2023-03-20 LAB
ALBUMIN SERPL-MCNC: 4.6 G/DL (ref 3.5–4.6)
ALP SERPL-CCNC: 106 U/L (ref 40–130)
ALT SERPL-CCNC: 28 U/L (ref 0–33)
ANION GAP SERPL CALCULATED.3IONS-SCNC: 11 MEQ/L (ref 9–15)
AST SERPL-CCNC: 18 U/L (ref 0–35)
BILIRUB SERPL-MCNC: 0.3 MG/DL (ref 0.2–0.7)
BUN SERPL-MCNC: 13 MG/DL (ref 6–20)
CALCIUM SERPL-MCNC: 9.4 MG/DL (ref 8.5–9.9)
CHLORIDE SERPL-SCNC: 104 MEQ/L (ref 95–107)
CHOLEST SERPL-MCNC: 150 MG/DL (ref 0–199)
CO2 SERPL-SCNC: 27 MEQ/L (ref 20–31)
CREAT SERPL-MCNC: 0.79 MG/DL (ref 0.5–0.9)
GLOBULIN SER CALC-MCNC: 2.6 G/DL (ref 2.3–3.5)
GLUCOSE SERPL-MCNC: 108 MG/DL (ref 70–99)
HDLC SERPL-MCNC: 41 MG/DL (ref 40–59)
LDLC SERPL CALC-MCNC: 75 MG/DL (ref 0–129)
POTASSIUM SERPL-SCNC: 4.7 MEQ/L (ref 3.4–4.9)
PROT SERPL-MCNC: 7.2 G/DL (ref 6.3–8)
SODIUM SERPL-SCNC: 142 MEQ/L (ref 135–144)
TRIGL SERPL-MCNC: 172 MG/DL (ref 0–150)

## 2023-03-29 RX ORDER — ATORVASTATIN CALCIUM 80 MG/1
40 TABLET, FILM COATED ORAL DAILY
Qty: 15 TABLET | Refills: 0
Start: 2023-03-29 | End: 2023-04-28

## 2023-04-25 DIAGNOSIS — I21.11 ST ELEVATION MYOCARDIAL INFARCTION INVOLVING RIGHT CORONARY ARTERY (HCC): Primary | ICD-10-CM

## 2023-04-25 RX ORDER — VITAMIN B COMPLEX
CAPSULE ORAL
Qty: 90 CAPSULE | Refills: 3 | Status: SHIPPED | OUTPATIENT
Start: 2023-04-25

## 2023-04-25 NOTE — TELEPHONE ENCOUNTER
Requesting medication refill. Please approve or deny this request.    Rx requested:  Requested Prescriptions     Pending Prescriptions Disp Refills    B Complex CAPS [Pharmacy Med Name: Vitamins B Complex capsule] 90 capsule 3     Sig: Take 1 capsule by mouth daily         Last Office Visit:   1/24/2023      Next Visit Date:  Future Appointments   Date Time Provider Sarika Beata   5/23/2023  3:15 PM Dayana Leone  [facility]   3/4/2024  2:15 PM Rock Marie MD [address]               Please approve or deny.

## 2023-05-23 ENCOUNTER — OFFICE VISIT (OUTPATIENT)
Dept: CARDIOLOGY CLINIC | Age: 53
End: 2023-05-23
Payer: COMMERCIAL

## 2023-05-23 VITALS
HEART RATE: 76 BPM | SYSTOLIC BLOOD PRESSURE: 128 MMHG | OXYGEN SATURATION: 99 % | WEIGHT: 167 LBS | DIASTOLIC BLOOD PRESSURE: 82 MMHG | BODY MASS INDEX: 28.67 KG/M2

## 2023-05-23 DIAGNOSIS — E78.5 HYPERLIPIDEMIA, UNSPECIFIED HYPERLIPIDEMIA TYPE: ICD-10-CM

## 2023-05-23 DIAGNOSIS — I65.22 STENOSIS OF LEFT CAROTID ARTERY: ICD-10-CM

## 2023-05-23 DIAGNOSIS — I21.11 ST ELEVATION MYOCARDIAL INFARCTION INVOLVING RIGHT CORONARY ARTERY (HCC): Primary | ICD-10-CM

## 2023-05-23 DIAGNOSIS — I10 ESSENTIAL HYPERTENSION: ICD-10-CM

## 2023-05-23 DIAGNOSIS — E78.5 DYSLIPIDEMIA: ICD-10-CM

## 2023-05-23 DIAGNOSIS — R07.9 CHEST PAIN, UNSPECIFIED TYPE: ICD-10-CM

## 2023-05-23 DIAGNOSIS — Z95.828 HISTORY OF LEFT COMMON CAROTID ARTERY STENT PLACEMENT: ICD-10-CM

## 2023-05-23 DIAGNOSIS — I25.10 CORONARY ARTERY DISEASE INVOLVING NATIVE CORONARY ARTERY OF NATIVE HEART WITHOUT ANGINA PECTORIS: ICD-10-CM

## 2023-05-23 DIAGNOSIS — I65.23 BILATERAL CAROTID ARTERY STENOSIS: ICD-10-CM

## 2023-05-23 DIAGNOSIS — G45.9 TIA (TRANSIENT ISCHEMIC ATTACK): ICD-10-CM

## 2023-05-23 DIAGNOSIS — Z98.890 HISTORY OF LEFT COMMON CAROTID ARTERY STENT PLACEMENT: ICD-10-CM

## 2023-05-23 PROCEDURE — 3074F SYST BP LT 130 MM HG: CPT | Performed by: INTERNAL MEDICINE

## 2023-05-23 PROCEDURE — 3079F DIAST BP 80-89 MM HG: CPT | Performed by: INTERNAL MEDICINE

## 2023-05-23 PROCEDURE — 99214 OFFICE O/P EST MOD 30 MIN: CPT | Performed by: INTERNAL MEDICINE

## 2023-05-23 ASSESSMENT — ENCOUNTER SYMPTOMS
EYES NEGATIVE: 1
NAUSEA: 0
SHORTNESS OF BREATH: 0
COUGH: 0
CHEST TIGHTNESS: 0
GASTROINTESTINAL NEGATIVE: 1
STRIDOR: 0
WHEEZING: 0
BLOOD IN STOOL: 0
RESPIRATORY NEGATIVE: 1

## 2023-05-23 NOTE — PROGRESS NOTES
OFFICE VISIT        Patient: Salena Castanon  YOB: 1970  MRN: [id number]    Chief Complaint: STEMI HTN HPL   Chief Complaint   Patient presents with    Follow-up     4 month for Dyslipidemia       CV Data:  10/21 LE Art - negative   8/221 Echo EF 55-60 Trace MR  8/25/21 STEMI RCA Px ABELARDO. CX distal 70-75%   44/51 CUS % LICA >66   10/92/06 Left Carotid stent. %  30/10 CUS LICA %     Subjective/HPI Dr. Yanira Ellis pt changing due to insurance. Pt has had rare pressure when working as a . These symptoms are different than her MI symptoms. Compliant with meds. Pt was taken off statin for unknown reason. 11/12/21 asymptomatic but has severe carotid dz. LICA >63% and LIU occluded. occ chest heaviness. No sob. 29/39/30 had LICA Stent did well. Recent HTN and went to ER. Has been stable. No more headaches. No cp some sob but no worse. 11//30/21 called in due to uncontrolled BP worsea t night. No cp but occ heavy feeling. No sob. Little puffy under her eye. No bleed. 12/27/21 in ER for Stroke like symptoms. No cp no sob no falls no bleed    2/23/22 still has rash thought to be related to HCTZ. BP running low little woozy. No cp no sob no falls no bleed    5/23/22 doing better no cp no sob takes meds. 9/27/22 doing well no cp no sob rare LH no falls no bleed. 1/24/23 doing well walking no cp no sob no falls no bleed. 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     5/23/23 doing well no cp no sob occ Low BP low and symptomatic. LDL 75     Work -   Former smoker- quit 8/2021. occ etoh  ++FH  Lives with sister.         EKG: SR 71    Past Medical History:   Diagnosis Date    Abnormal Pap smear of cervix     CAD (coronary artery disease)     Diverticular disease     Diverticulitis        Past Surgical History:   Procedure Laterality Date    CARDIAC SURGERY      stents (2)    DILATION AND CURETTAGE         Family History   Problem Relation Age

## 2023-07-17 ENCOUNTER — HOSPITAL ENCOUNTER (OUTPATIENT)
Dept: NUCLEAR MEDICINE | Age: 53
Discharge: HOME | End: 2023-07-19
Payer: COMMERCIAL

## 2023-07-17 ENCOUNTER — HOSPITAL ENCOUNTER (OUTPATIENT)
Dept: NON INVASIVE DIAGNOSTICS | Age: 53
Discharge: HOME | End: 2023-07-17
Payer: COMMERCIAL

## 2023-07-17 DIAGNOSIS — R07.9 CHEST PAIN, UNSPECIFIED TYPE: ICD-10-CM

## 2023-07-17 PROCEDURE — 3430000000 HC RX DIAGNOSTIC RADIOPHARMACEUTICAL: Performed by: INTERNAL MEDICINE

## 2023-07-17 PROCEDURE — A9502 TC99M TETROFOSMIN: HCPCS | Performed by: INTERNAL MEDICINE

## 2023-07-17 PROCEDURE — 78452 HT MUSCLE IMAGE SPECT MULT: CPT

## 2023-07-17 PROCEDURE — 93017 CV STRESS TEST TRACING ONLY: CPT

## 2023-07-17 PROCEDURE — 2580000003 HC RX 258: Performed by: INTERNAL MEDICINE

## 2023-07-17 RX ORDER — SODIUM CHLORIDE 0.9 % (FLUSH) 0.9 %
10 SYRINGE (ML) INJECTION PRN
Status: DISCONTINUED | OUTPATIENT
Start: 2023-07-17 | End: 2023-07-20 | Stop reason: HOSPADM

## 2023-07-17 RX ADMIN — Medication 10 ML: at 11:03

## 2023-07-17 RX ADMIN — Medication 10 ML: at 13:03

## 2023-07-17 RX ADMIN — TETROFOSMIN 33.5 MILLICURIE: 1.38 INJECTION, POWDER, LYOPHILIZED, FOR SOLUTION INTRAVENOUS at 13:03

## 2023-07-17 RX ADMIN — TETROFOSMIN 11.8 MILLICURIE: 1.38 INJECTION, POWDER, LYOPHILIZED, FOR SOLUTION INTRAVENOUS at 11:03

## 2023-07-17 NOTE — PROGRESS NOTES
Hx,allergies and medications reviewed. Patient held her home medications prior to testing. [address] [place] [address] here. Injected patient with isotope and Myoview. Tolerated procedure well. Iionqkh88 % target HR. SOB reported. Returned to baseline in recovery. Denied chest pain or pressure. EKG shows no noted ectopy. Doctor to further review and interpret results.

## 2023-07-22 NOTE — PROCEDURES
[facility] [place], [address] [facility]                              CARDIAC STRESS TEST    PATIENT NAME: Tami Edmondson                  :        1970  MED REC NO:   [id number]                            ROOM:  ACCOUNT NO:   [de-identified]                           ADMIT DATE: 2023  PROVIDER:     Art Cisneros MD    CARDIOVASCULAR DIAGNOSTIC DEPARTMENT    DATE OF STUDY:  2023    NUCLEAR STRESS TEST REPORT    ORDERING PROVIDER:  Claudette Díaz MD    INDICATIONS:  Chest pain. DESCRIPTION OF PROCEDURE:  After informed written consent a baseline EKG  was obtained, which showed normal sinus rhythm. For the rest portion of  the test, the patient received 11.8 mCi of Myoview IV. After  appropriate delay, rest SPECT images were obtained. For the stress  portion of the test, the patient exercised according to the Nick  protocol for 5 minutes and 50 seconds. The patient achieved 89% of the maximum age predicted heart rate, which  represents 7 METs. After exercise, the patient received 33.5 mCi of Myoview IV. After  appropriate delay, stress SPECT images were obtained. FINDINGS:  EKG during the stress portion of the test showed no ischemia,  no major arrhythmias. The patient remained asymptomatic. GATED SPECT IMAGES:  Images demonstrated decreased wall thickening in  the inferior wall with mild hypokinesis in the inferior wall was normal.    Normal EF of 83%. Normal TID of 0.78    SPECT IMAGES:  Images demonstrated a large fixed perfusion abnormality  of moderate intensity in the lateral wall as well as some large size  fixed perfusion abnormality of moderate intensity in the inferior wall  artery. There were no reversible perfusion abnormalities or  stress-induced ischemia noted. The remainder of the ventricle has normal perfusion at rest and with  stress. CONCLUSIONS:  1.   This nuclear stress test was

## 2023-08-23 RX ORDER — ASPIRIN 81 MG/1
81 TABLET, CHEWABLE ORAL DAILY
Qty: 90 TABLET | Refills: 3 | Status: SHIPPED | OUTPATIENT
Start: 2023-08-23

## 2023-08-23 RX ORDER — CARVEDILOL 3.12 MG/1
TABLET ORAL
Qty: 180 TABLET | Refills: 3 | Status: SHIPPED | OUTPATIENT
Start: 2023-08-23

## 2023-08-23 RX ORDER — ATORVASTATIN CALCIUM 80 MG/1
40 TABLET, FILM COATED ORAL NIGHTLY
Qty: 90 TABLET | Refills: 3 | Status: SHIPPED | OUTPATIENT
Start: 2023-08-23

## 2023-08-23 RX ORDER — LISINOPRIL 5 MG/1
5 TABLET ORAL DAILY
Qty: 90 TABLET | Refills: 3 | Status: SHIPPED | OUTPATIENT
Start: 2023-08-23

## 2023-08-23 RX ORDER — CLOPIDOGREL BISULFATE 75 MG/1
75 TABLET ORAL DAILY
Qty: 90 TABLET | Refills: 3 | Status: SHIPPED | OUTPATIENT
Start: 2023-08-23

## 2023-08-23 NOTE — TELEPHONE ENCOUNTER
Requesting medication refill.  Please approve or deny this request.    Rx requested:  Requested Prescriptions     Pending Prescriptions Disp Refills    clopidogrel (PLAVIX) 75 MG tablet [Pharmacy Med Name: clopidogrel 75 mg tablet] 90 tablet 3     Sig: TAKE 1 TABLET BY MOUTH DAILY    lisinopril (PRINIVIL;ZESTRIL) 5 MG tablet [Pharmacy Med Name: lisinopril 5 mg tablet] 90 tablet 3     Sig: TAKE 1 TABLET BY MOUTH DAILY    carvedilol (COREG) 3.125 MG tablet [Pharmacy Med Name: carvedilol 3.125 mg tablet] 180 tablet 3     Sig: TAKE 1 TABLET BY MOUTH TWICE A DAY         Last Office Visit:   5/23/2023      Next Visit Date:  Future Appointments   Date Time Provider [address]   9/18/2023  2:45 PM Thu Plummer MD [address] [facility]   3/4/2024  2:15 PM Bridger Montenegro MD [address] [place] [address]

## 2023-09-11 ENCOUNTER — APPOINTMENT (OUTPATIENT)
Dept: PRIMARY CARE | Facility: CLINIC | Age: 53
End: 2023-09-11
Payer: COMMERCIAL

## 2023-09-18 ENCOUNTER — OFFICE VISIT (OUTPATIENT)
Dept: CARDIOLOGY CLINIC | Age: 53
End: 2023-09-18
Payer: COMMERCIAL

## 2023-09-18 VITALS
WEIGHT: 166.2 LBS | SYSTOLIC BLOOD PRESSURE: 130 MMHG | OXYGEN SATURATION: 98 % | HEIGHT: 64 IN | HEART RATE: 79 BPM | BODY MASS INDEX: 28.38 KG/M2 | DIASTOLIC BLOOD PRESSURE: 68 MMHG

## 2023-09-18 DIAGNOSIS — I10 ESSENTIAL HYPERTENSION: ICD-10-CM

## 2023-09-18 DIAGNOSIS — I65.22 STENOSIS OF LEFT CAROTID ARTERY: ICD-10-CM

## 2023-09-18 DIAGNOSIS — Z98.890 HISTORY OF LEFT COMMON CAROTID ARTERY STENT PLACEMENT: ICD-10-CM

## 2023-09-18 DIAGNOSIS — E78.5 HYPERLIPIDEMIA, UNSPECIFIED HYPERLIPIDEMIA TYPE: ICD-10-CM

## 2023-09-18 DIAGNOSIS — Z95.828 HISTORY OF LEFT COMMON CAROTID ARTERY STENT PLACEMENT: ICD-10-CM

## 2023-09-18 DIAGNOSIS — E78.5 DYSLIPIDEMIA: ICD-10-CM

## 2023-09-18 DIAGNOSIS — G45.9 TIA (TRANSIENT ISCHEMIC ATTACK): ICD-10-CM

## 2023-09-18 DIAGNOSIS — Z00.00 PE (PHYSICAL EXAM), ANNUAL: Primary | ICD-10-CM

## 2023-09-18 DIAGNOSIS — I25.10 CORONARY ARTERY DISEASE INVOLVING NATIVE CORONARY ARTERY OF NATIVE HEART WITHOUT ANGINA PECTORIS: ICD-10-CM

## 2023-09-18 DIAGNOSIS — I65.23 BILATERAL CAROTID ARTERY STENOSIS: ICD-10-CM

## 2023-09-18 DIAGNOSIS — I21.11 ST ELEVATION MYOCARDIAL INFARCTION INVOLVING RIGHT CORONARY ARTERY (HCC): ICD-10-CM

## 2023-09-18 PROCEDURE — 3078F DIAST BP <80 MM HG: CPT | Performed by: INTERNAL MEDICINE

## 2023-09-18 PROCEDURE — G8427 DOCREV CUR MEDS BY ELIG CLIN: HCPCS | Performed by: INTERNAL MEDICINE

## 2023-09-18 PROCEDURE — 99214 OFFICE O/P EST MOD 30 MIN: CPT | Performed by: INTERNAL MEDICINE

## 2023-09-18 PROCEDURE — 3017F COLORECTAL CA SCREEN DOC REV: CPT | Performed by: INTERNAL MEDICINE

## 2023-09-18 PROCEDURE — 93000 ELECTROCARDIOGRAM COMPLETE: CPT | Performed by: INTERNAL MEDICINE

## 2023-09-18 PROCEDURE — G8419 CALC BMI OUT NRM PARAM NOF/U: HCPCS | Performed by: INTERNAL MEDICINE

## 2023-09-18 PROCEDURE — 3075F SYST BP GE 130 - 139MM HG: CPT | Performed by: INTERNAL MEDICINE

## 2023-09-18 PROCEDURE — 1036F TOBACCO NON-USER: CPT | Performed by: INTERNAL MEDICINE

## 2023-09-18 RX ORDER — CLOPIDOGREL BISULFATE 75 MG/1
75 TABLET ORAL DAILY
Qty: 90 TABLET | Refills: 3 | Status: SHIPPED | OUTPATIENT
Start: 2023-09-18

## 2023-09-18 RX ORDER — VITAMIN B COMPLEX
1 CAPSULE ORAL DAILY
Qty: 90 CAPSULE | Refills: 3 | Status: SHIPPED | OUTPATIENT
Start: 2023-09-18

## 2023-09-18 RX ORDER — LISINOPRIL 5 MG/1
5 TABLET ORAL DAILY
Qty: 90 TABLET | Refills: 3 | Status: SHIPPED | OUTPATIENT
Start: 2023-09-18

## 2023-09-18 RX ORDER — ATORVASTATIN CALCIUM 80 MG/1
40 TABLET, FILM COATED ORAL NIGHTLY
Qty: 90 TABLET | Refills: 3 | Status: SHIPPED | OUTPATIENT
Start: 2023-09-18

## 2023-09-18 RX ORDER — ASPIRIN 81 MG/1
81 TABLET, CHEWABLE ORAL DAILY
Qty: 90 TABLET | Refills: 3 | Status: SHIPPED | OUTPATIENT
Start: 2023-09-18

## 2023-09-18 RX ORDER — ASCORBIC ACID 500 MG
500 TABLET ORAL DAILY
Qty: 90 TABLET | Refills: 3 | Status: SHIPPED | OUTPATIENT
Start: 2023-09-18

## 2023-09-18 RX ORDER — CARVEDILOL 3.12 MG/1
TABLET ORAL
Qty: 180 TABLET | Refills: 3 | Status: SHIPPED | OUTPATIENT
Start: 2023-09-18

## 2023-09-18 RX ORDER — NITROGLYCERIN 0.4 MG/1
TABLET SUBLINGUAL
Qty: 25 TABLET | Refills: 3 | Status: SHIPPED | OUTPATIENT
Start: 2023-09-18

## 2023-09-18 ASSESSMENT — ENCOUNTER SYMPTOMS
SHORTNESS OF BREATH: 0
GASTROINTESTINAL NEGATIVE: 1
RESPIRATORY NEGATIVE: 1
STRIDOR: 0
COUGH: 0
NAUSEA: 0
CHEST TIGHTNESS: 0
EYES NEGATIVE: 1
BLOOD IN STOOL: 0
WHEEZING: 0

## 2023-09-18 NOTE — PROGRESS NOTES
OFFICE VISIT        Patient: Eduardo Patel  YOB: 1970  MRN: [id number]    Chief Complaint: STEMI HTN HPL   Chief Complaint   Patient presents with    Follow-up     4 month for TIA    Results     Stress       CV Data:  10/21 LE Art - negative   8/221 Echo EF 55-60 Trace MR  8/25/21 STEMI RCA Px ABELARDO. CX distal 70-75%   41/44 CUS % LICA >28   87/37/94 Left Carotid stent. %  37/34 CUS LICA stent Patent. %   7/23 SPECT Lat and Inferior scar. No ischemia. Subjective/HPI Dr. Oc Panda pt changing due to insurance. Pt has had rare pressure when working as a . These symptoms are different than her MI symptoms. Compliant with meds. Pt was taken off statin for unknown reason. 11/12/21 asymptomatic but has severe carotid dz. LICA >70% and LIU occluded. occ chest heaviness. No sob. 87/81/35 had LICA Stent did well. Recent HTN and went to ER. Has been stable. No more headaches. No cp some sob but no worse. 11//30/21 called in due to uncontrolled BP worsea t night. No cp but occ heavy feeling. No sob. Little puffy under her eye. No bleed. 12/27/21 in ER for Stroke like symptoms. No cp no sob no falls no bleed    2/23/22 still has rash thought to be related to HCTZ. BP running low little woozy. No cp no sob no falls no bleed    5/23/22 doing better no cp no sob takes meds. 9/27/22 doing well no cp no sob rare LH no falls no bleed. 1/24/23 doing well walking no cp no sob no falls no bleed. 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     5/23/23 doing well no cp no sob occ Low BP low and symptomatic. LDL 75     9/18/23 last week has couple days of vertigo. Now resolved. Her BP ws little high during those days. No cp no sob active no edema no bleed. Work -   Former smoker- quit 8/2021. occ etoh  ++FH  Lives with sister.         EKG: SR 72    Past Medical History:   Diagnosis Date    Abnormal Pap smear of cervix     CAD (coronary

## 2023-09-25 ENCOUNTER — OFFICE VISIT (OUTPATIENT)
Dept: PRIMARY CARE | Facility: CLINIC | Age: 53
End: 2023-09-25
Payer: COMMERCIAL

## 2023-09-25 VITALS
OXYGEN SATURATION: 98 % | HEIGHT: 64 IN | TEMPERATURE: 98.1 F | DIASTOLIC BLOOD PRESSURE: 88 MMHG | BODY MASS INDEX: 28.19 KG/M2 | WEIGHT: 165.1 LBS | HEART RATE: 75 BPM | SYSTOLIC BLOOD PRESSURE: 138 MMHG

## 2023-09-25 DIAGNOSIS — Z12.11 ENCOUNTER FOR SCREENING FOR MALIGNANT NEOPLASM OF COLON: ICD-10-CM

## 2023-09-25 DIAGNOSIS — I65.21 OCCLUSION OF RIGHT CAROTID ARTERY: ICD-10-CM

## 2023-09-25 DIAGNOSIS — E78.2 MIXED HYPERLIPIDEMIA: ICD-10-CM

## 2023-09-25 DIAGNOSIS — I10 PRIMARY HYPERTENSION: Primary | ICD-10-CM

## 2023-09-25 DIAGNOSIS — I65.22 STENOSIS OF LEFT CAROTID ARTERY: ICD-10-CM

## 2023-09-25 DIAGNOSIS — I25.10 CORONARY ARTERY DISEASE INVOLVING NATIVE HEART WITHOUT ANGINA PECTORIS, UNSPECIFIED VESSEL OR LESION TYPE: ICD-10-CM

## 2023-09-25 DIAGNOSIS — M62.838 MUSCLE SPASM: ICD-10-CM

## 2023-09-25 DIAGNOSIS — Z23 NEED FOR IMMUNIZATION AGAINST INFLUENZA: ICD-10-CM

## 2023-09-25 PROBLEM — M79.674 PAIN OF TOE OF RIGHT FOOT: Status: ACTIVE | Noted: 2021-07-10

## 2023-09-25 PROBLEM — Z95.828 PRESENCE OF STENT IN ARTERY: Status: ACTIVE | Noted: 2021-12-06

## 2023-09-25 PROBLEM — G97.82 HYPERPERFUSION SYNDROME, STATUS POST CAROTID ENDARTERECTOMY: Status: ACTIVE | Noted: 2021-12-06

## 2023-09-25 PROBLEM — N93.8 DUB (DYSFUNCTIONAL UTERINE BLEEDING): Status: ACTIVE | Noted: 2019-03-21

## 2023-09-25 PROBLEM — I63.512 CEREBROVASCULAR ACCIDENT (CVA) DUE TO STENOSIS OF LEFT MIDDLE CEREBRAL ARTERY (MULTI): Status: ACTIVE | Noted: 2021-12-06

## 2023-09-25 PROBLEM — L84 CALLUS: Status: ACTIVE | Noted: 2021-07-10

## 2023-09-25 PROBLEM — K29.00 ACUTE GASTRITIS: Status: ACTIVE | Noted: 2019-03-21

## 2023-09-25 PROBLEM — N92.6 IRREGULAR PERIODS: Status: ACTIVE | Noted: 2019-03-21

## 2023-09-25 PROBLEM — D25.9 UTERINE LEIOMYOMA: Status: ACTIVE | Noted: 2019-03-21

## 2023-09-25 PROBLEM — R10.84 GENERALIZED ABDOMINAL PAIN: Status: ACTIVE | Noted: 2019-03-21

## 2023-09-25 PROCEDURE — 3079F DIAST BP 80-89 MM HG: CPT | Performed by: FAMILY MEDICINE

## 2023-09-25 PROCEDURE — 3075F SYST BP GE 130 - 139MM HG: CPT | Performed by: FAMILY MEDICINE

## 2023-09-25 PROCEDURE — 1036F TOBACCO NON-USER: CPT | Performed by: FAMILY MEDICINE

## 2023-09-25 PROCEDURE — 99213 OFFICE O/P EST LOW 20 MIN: CPT | Performed by: FAMILY MEDICINE

## 2023-09-25 ASSESSMENT — PATIENT HEALTH QUESTIONNAIRE - PHQ9
1. LITTLE INTEREST OR PLEASURE IN DOING THINGS: NOT AT ALL
SUM OF ALL RESPONSES TO PHQ9 QUESTIONS 1 AND 2: 0
2. FEELING DOWN, DEPRESSED OR HOPELESS: NOT AT ALL

## 2023-09-25 NOTE — PROGRESS NOTES
Subjective   Patient ID: Maria Fernanda Glasgow is a 52 y.o. female who presents for follow up monitoring and management of multiple medical conditions.      HPI     Patient states that she has been experiencing some muscle spasms in her lower legs at times.     Patient has requested an epi pen due to being allergic to bees    Declined flu shot      Pt has HTN  She occasionally checks her BP at home with similar readings to office.  Denies chest pain, dizziness, LE swelling.      Patient has hyperlipidemia.  She tries to limit the amount of fatty foods and high cholesterol foods that are consumed.  Patient is compliant with stain therapy and denies any noted side effects.     Pt has CAD.  She had an MI 8/25/2021 and had 2 stents placed.  Pt does follow Dr. Jackson, cardiology, for HTN, HLD, CAD with HX of MI. Next visit is on 9/30/2022.  Denies chest pain, palpitations, abnormal bleeding or bruising.      She has carotid artery stenosis.  She was found to have 100% occlusion of the right carotid artery.  Left Carotid was stented 11/15/2022.        Pt quit smoking after her MI on 8/25/21.     Review of Systems  Constitutional: Patient denies any fever, chills, loss of appetite, or unexplained weight loss.  Cardiovascular: Patient denies any chest pain, shortness of breath with exertion, tachycardia, palpitations, orthopnea, or paroxysmal nocturnal dyspnea.  Respiratory: Patient denies any cough, shortness breath, or wheezing.  Gastrointestinal: Patient denies any nausea, vomiting, diarrhea, constipation, melena, hematochezia, or reflux symptoms.  Skin: Denies any rashes or skin lesions.   Neurology: Patient denies any new motor or sensory losses.  Denies any numbness, tingling, weakness, and incoordination of the extremities.  Patient also denies any tremor, seizures, or gait instability.  Endocrinology: Denies any polyuria, polydipsia, polyphagia, or heat/cold intolerance.    Objective   /85   Pulse 75   Temp 36.7 °C  "(98.1 °F)   Ht 1.626 m (5' 4\")   Wt 74.9 kg (165 lb 1.6 oz)   SpO2 98%   BMI 28.34 kg/m²     Physical Exam  General Appearance: Alert and cooperative, in no acute distress, well-developed/well-nourished female.  Neck: Supple and without adenopathy or rigidity.  There is no JVD at 90° and no carotid bruits are noted.  There is no thyromegaly, thyroid tenderness, or palpable thyroid nodules.  Heart: Regular rate and rhythm without murmur or ectopy.  Respiratory: Lungs are clear to auscultation bilaterally with good air exchange.  Good respiratory effort and no accessory muscle use.  Skin: Good turgor, moist, warm and without rashes or lesions.  Neurological exam: Alert and oriented ×3, no tremor, normal gait.  Extremities: No clubbing, cyanosis, or edema    Assessment/Plan     Muscle spasms:  2023: Patient will try over-the-counter magnesium oxide at a dose of 400 mg once daily at bedtime.    HTN: Blood pressure appears adequately controlled and we will continue with the current antihypertensive therapy.     Hyperlipidemia: Stable based on last labs.   Continue with the current medication.   Dietary changes, exercise, and maintenance of healthy weight were discussed at length.     Coronary artery disease:   Stable based on symptoms.  Patient is without worrisome signs or symptoms for unstable angina.   Risk factor modification was discussed at length.   Dietary changes, exercise and maintenance of a healthy weight were discussed.  Patient had a MI 21 and underwent 2 stents (Dr. Parra).  She follows with Dr. Jackson (cardiology) at [facility].     Left carotid artery stenosis / Right carotid artery occlusion: She had a left carotid artery stent on 11/15/21 and the RIGHT carotid artery was found to be 100% blocked.     Colon cancer screenin2023:  PT WAS CLEARED BY CARDIOLOGY TO HAVE COLONOSCOPY AND TOLD TO STOP PLAVIX 1 WEEK BEFORE THE PROCEDURE.  PT HAS NOT SCHEDULED THE PROCEDURE " YET.      LAST COLONOSCOPY DONE BEFORE 2013  MAMM DUE 3/4/2024      Orders Placed This Encounter   Procedures    Referral to Gastroenterology

## 2023-09-25 NOTE — PATIENT INSTRUCTIONS
Try over the counter magnesium oxide 400 mg once a day in the evening.  Can take it twice a day if you have symptoms during the day.    HAVE FASTING LABS SOON.  FOLLOW UP IN 6 MONTHS.    It was a pleasure to see you today. Thank you for choosing us for your health care needs.    If you have lab or other testing completed and have not been informed of results within one week, please call the office for your results.    If you haven't done so, consider signing up for [facility] [employer], the [facility] personal health record. Ask the staff how you can get started.

## 2023-10-03 DIAGNOSIS — I10 ESSENTIAL HYPERTENSION: ICD-10-CM

## 2023-10-03 LAB
ALBUMIN SERPL-MCNC: 4.8 G/DL (ref 3.5–4.6)
ALP SERPL-CCNC: 105 U/L (ref 40–130)
ALT SERPL-CCNC: 33 U/L (ref 0–33)
ANION GAP SERPL CALCULATED.3IONS-SCNC: 13 MEQ/L (ref 9–15)
AST SERPL-CCNC: 21 U/L (ref 0–35)
BILIRUB SERPL-MCNC: 0.6 MG/DL (ref 0.2–0.7)
BUN SERPL-MCNC: 15 MG/DL (ref 6–20)
CALCIUM SERPL-MCNC: 9.7 MG/DL (ref 8.5–9.9)
CHLORIDE SERPL-SCNC: 103 MEQ/L (ref 95–107)
CHOLEST SERPL-MCNC: 174 MG/DL (ref 0–199)
CO2 SERPL-SCNC: 26 MEQ/L (ref 20–31)
CREAT SERPL-MCNC: 0.78 MG/DL (ref 0.5–0.9)
ERYTHROCYTE [DISTWIDTH] IN BLOOD BY AUTOMATED COUNT: 13.8 % (ref 11.5–14.5)
GLOBULIN SER CALC-MCNC: 3 G/DL (ref 2.3–3.5)
GLUCOSE SERPL-MCNC: 101 MG/DL (ref 70–99)
HCT VFR BLD AUTO: 44.6 % (ref 37–47)
HDLC SERPL-MCNC: 42 MG/DL (ref 40–59)
HGB BLD-MCNC: 14.3 G/DL (ref 12–16)
LDL CHOLESTEROL CALCULATED: 86 MG/DL (ref 0–129)
MAGNESIUM SERPL-MCNC: 2.2 MG/DL (ref 1.7–2.4)
MCH RBC QN AUTO: 27.3 PG (ref 27–31.3)
MCHC RBC AUTO-ENTMCNC: 32.1 % (ref 33–37)
MCV RBC AUTO: 85.1 FL (ref 79.4–94.8)
PLATELET # BLD AUTO: 314 K/UL (ref 130–400)
POTASSIUM SERPL-SCNC: 4.5 MEQ/L (ref 3.4–4.9)
PROT SERPL-MCNC: 7.8 G/DL (ref 6.3–8)
RBC # BLD AUTO: 5.24 M/UL (ref 4.2–5.4)
SODIUM SERPL-SCNC: 142 MEQ/L (ref 135–144)
TRIGLYCERIDE, FASTING: 228 MG/DL (ref 0–150)
TSH SERPL-MCNC: 2.13 UIU/ML (ref 0.44–3.86)
WBC # BLD AUTO: 5.7 K/UL (ref 4.8–10.8)

## 2023-10-13 RX ORDER — ATORVASTATIN CALCIUM 80 MG/1
80 TABLET, FILM COATED ORAL NIGHTLY
COMMUNITY
End: 2023-10-13 | Stop reason: SDUPTHER

## 2023-10-13 RX ORDER — ATORVASTATIN CALCIUM 80 MG/1
80 TABLET, FILM COATED ORAL NIGHTLY
Qty: 30 TABLET | Refills: 11 | Status: SHIPPED | OUTPATIENT
Start: 2023-10-13

## 2023-10-24 ENCOUNTER — APPOINTMENT (OUTPATIENT)
Dept: GENERAL RADIOLOGY | Age: 53
End: 2023-10-24
Payer: COMMERCIAL

## 2023-10-24 ENCOUNTER — HOSPITAL ENCOUNTER (EMERGENCY)
Age: 53
Discharge: HOME | End: 2023-10-24
Payer: COMMERCIAL

## 2023-10-24 VITALS
HEIGHT: 64 IN | RESPIRATION RATE: 15 BRPM | HEART RATE: 62 BPM | OXYGEN SATURATION: 100 % | BODY MASS INDEX: 26.46 KG/M2 | DIASTOLIC BLOOD PRESSURE: 69 MMHG | WEIGHT: 155 LBS | TEMPERATURE: 97.2 F | SYSTOLIC BLOOD PRESSURE: 122 MMHG

## 2023-10-24 DIAGNOSIS — R11.0 NAUSEA: ICD-10-CM

## 2023-10-24 DIAGNOSIS — R07.89 ATYPICAL CHEST PAIN: Primary | ICD-10-CM

## 2023-10-24 DIAGNOSIS — I10 ESSENTIAL HYPERTENSION: ICD-10-CM

## 2023-10-24 LAB
ALBUMIN SERPL-MCNC: 4.5 G/DL (ref 3.5–4.6)
ALP SERPL-CCNC: 104 U/L (ref 40–130)
ALT SERPL-CCNC: 38 U/L (ref 0–33)
ANION GAP SERPL CALCULATED.3IONS-SCNC: 11 MEQ/L (ref 9–15)
AST SERPL-CCNC: 24 U/L (ref 0–35)
BASOPHILS # BLD: 0 K/UL (ref 0–0.2)
BASOPHILS NFR BLD: 0.5 %
BILIRUB SERPL-MCNC: 0.4 MG/DL (ref 0.2–0.7)
BUN SERPL-MCNC: 13 MG/DL (ref 6–20)
CALCIUM SERPL-MCNC: 9.4 MG/DL (ref 8.5–9.9)
CHLORIDE SERPL-SCNC: 100 MEQ/L (ref 95–107)
CO2 SERPL-SCNC: 25 MEQ/L (ref 20–31)
CREAT SERPL-MCNC: 0.79 MG/DL (ref 0.5–0.9)
EOSINOPHIL # BLD: 0.1 K/UL (ref 0–0.7)
EOSINOPHIL NFR BLD: 1.1 %
ERYTHROCYTE [DISTWIDTH] IN BLOOD BY AUTOMATED COUNT: 13.7 % (ref 11.5–14.5)
GLOBULIN SER CALC-MCNC: 2.9 G/DL (ref 2.3–3.5)
GLUCOSE SERPL-MCNC: 185 MG/DL (ref 70–99)
HCT VFR BLD AUTO: 45 % (ref 37–47)
HGB BLD-MCNC: 14.1 G/DL (ref 12–16)
LYMPHOCYTES # BLD: 1.1 K/UL (ref 1–4.8)
LYMPHOCYTES NFR BLD: 14 %
MAGNESIUM SERPL-MCNC: 2 MG/DL (ref 1.7–2.4)
MCH RBC QN AUTO: 27.1 PG (ref 27–31.3)
MCHC RBC AUTO-ENTMCNC: 31.3 % (ref 33–37)
MCV RBC AUTO: 86.5 FL (ref 79.4–94.8)
MONOCYTES # BLD: 0.4 K/UL (ref 0.2–0.8)
MONOCYTES NFR BLD: 5.4 %
NEUTROPHILS # BLD: 6.4 K/UL (ref 1.4–6.5)
NEUTS SEG NFR BLD: 78.9 %
PLATELET # BLD AUTO: 303 K/UL (ref 130–400)
POTASSIUM SERPL-SCNC: 4.1 MEQ/L (ref 3.4–4.9)
PROT SERPL-MCNC: 7.4 G/DL (ref 6.3–8)
RBC # BLD AUTO: 5.2 M/UL (ref 4.2–5.4)
SODIUM SERPL-SCNC: 136 MEQ/L (ref 135–144)
TROPONIN, HIGH SENSITIVITY: 7 NG/L (ref 0–19)
TROPONIN, HIGH SENSITIVITY: <6 NG/L (ref 0–19)
TSH SERPL-MCNC: 1.21 UIU/ML (ref 0.44–3.86)
WBC # BLD AUTO: 8.1 K/UL (ref 4.8–10.8)

## 2023-10-24 PROCEDURE — 99285 EMERGENCY DEPT VISIT HI MDM: CPT

## 2023-10-24 PROCEDURE — 71045 X-RAY EXAM CHEST 1 VIEW: CPT

## 2023-10-24 PROCEDURE — 96374 THER/PROPH/DIAG INJ IV PUSH: CPT

## 2023-10-24 PROCEDURE — 93005 ELECTROCARDIOGRAM TRACING: CPT | Performed by: PHYSICIAN ASSISTANT

## 2023-10-24 PROCEDURE — 96375 TX/PRO/DX INJ NEW DRUG ADDON: CPT

## 2023-10-24 PROCEDURE — 80053 COMPREHEN METABOLIC PANEL: CPT

## 2023-10-24 PROCEDURE — 36415 COLL VENOUS BLD VENIPUNCTURE: CPT

## 2023-10-24 PROCEDURE — 84484 ASSAY OF TROPONIN QUANT: CPT

## 2023-10-24 PROCEDURE — 84443 ASSAY THYROID STIM HORMONE: CPT

## 2023-10-24 PROCEDURE — 6360000002 HC RX W HCPCS: Performed by: PHYSICIAN ASSISTANT

## 2023-10-24 PROCEDURE — 85025 COMPLETE CBC W/AUTO DIFF WBC: CPT

## 2023-10-24 PROCEDURE — 83735 ASSAY OF MAGNESIUM: CPT

## 2023-10-24 RX ORDER — ONDANSETRON 2 MG/ML
4 INJECTION INTRAMUSCULAR; INTRAVENOUS ONCE
Status: COMPLETED | OUTPATIENT
Start: 2023-10-24 | End: 2023-10-24

## 2023-10-24 RX ORDER — KETOROLAC TROMETHAMINE 15 MG/ML
30 INJECTION, SOLUTION INTRAMUSCULAR; INTRAVENOUS ONCE
Status: COMPLETED | OUTPATIENT
Start: 2023-10-24 | End: 2023-10-24

## 2023-10-24 RX ORDER — MELOXICAM 7.5 MG/1
7.5 TABLET ORAL DAILY
Qty: 7 TABLET | Refills: 0 | Status: SHIPPED | OUTPATIENT
Start: 2023-10-24

## 2023-10-24 RX ORDER — MELOXICAM 7.5 MG/1
7.5 TABLET ORAL DAILY
Qty: 7 TABLET | Refills: 0 | Status: SHIPPED | OUTPATIENT
Start: 2023-10-24 | End: 2023-10-24 | Stop reason: SDUPTHER

## 2023-10-24 RX ORDER — ONDANSETRON 4 MG/1
4 TABLET, FILM COATED ORAL EVERY 8 HOURS PRN
Qty: 20 TABLET | Refills: 0 | Status: SHIPPED | OUTPATIENT
Start: 2023-10-24

## 2023-10-24 RX ADMIN — KETOROLAC TROMETHAMINE 30 MG: 15 INJECTION, SOLUTION INTRAMUSCULAR; INTRAVENOUS at 16:08

## 2023-10-24 RX ADMIN — ONDANSETRON 4 MG: 2 INJECTION INTRAMUSCULAR; INTRAVENOUS at 16:08

## 2023-10-24 ASSESSMENT — ENCOUNTER SYMPTOMS
ANAL BLEEDING: 0
EYE DISCHARGE: 0
NAUSEA: 1
ABDOMINAL DISTENTION: 0
COUGH: 0
VOMITING: 0
APNEA: 0
SHORTNESS OF BREATH: 0
SORE THROAT: 0

## 2023-10-24 ASSESSMENT — PAIN DESCRIPTION - FREQUENCY: FREQUENCY: INTERMITTENT

## 2023-10-24 ASSESSMENT — PAIN DESCRIPTION - PAIN TYPE: TYPE: ACUTE PAIN

## 2023-10-24 ASSESSMENT — PAIN SCALES - GENERAL
PAINLEVEL_OUTOF10: 5
PAINLEVEL_OUTOF10: 8

## 2023-10-24 ASSESSMENT — PAIN DESCRIPTION - DESCRIPTORS: DESCRIPTORS: BURNING

## 2023-10-24 ASSESSMENT — PAIN DESCRIPTION - ORIENTATION: ORIENTATION: LEFT

## 2023-10-24 ASSESSMENT — PAIN DESCRIPTION - ONSET: ONSET: ON-GOING

## 2023-10-24 ASSESSMENT — PAIN - FUNCTIONAL ASSESSMENT: PAIN_FUNCTIONAL_ASSESSMENT: 0-10

## 2023-10-24 ASSESSMENT — PAIN DESCRIPTION - LOCATION: LOCATION: CHEST

## 2023-10-24 NOTE — ED PROVIDER NOTES
details documented in ED Course. Details: cxr no acute process see final radiology report  ECG/medicine tests: ordered and independent interpretation performed. Details: EKG normal sinus rhythm rate 70 negative ST segment elevation. Risk  Prescription drug management. REASSESSMENT     ED Course as of 10/24/23 1808   Tue Oct 24, 2023   1613 Glucose, Random(!): 185 [GR]   1615 Blood pressure currently 136 over 82 mm jamee [GR]   1706 Troponin, High Sensitivity: <6 [GR]   1754 Troponin, High Sensitivity: 7 [GR]      ED Course User Index  [GR] Ita Grey PA-C         CRITICAL CARE TIME       CONSULTS:  None    PROCEDURES:  Unless otherwise noted below, none     Procedures        FINAL IMPRESSION      1. Atypical chest pain    2. Essential hypertension    3. Nausea          DISPOSITION/PLAN   DISPOSITION Decision To Discharge 10/24/2023 06:03:27 PM      PATIENT REFERRED TO:  Wendy Young MD  [id number] [address] [address]  [phone]    Call in 1 day      [facility]  [address]  [phone]  Go to   If symptoms worsen    Dr. Theresa Santizo    Call in 1 day        DISCHARGE MEDICATIONS:  New Prescriptions    MELOXICAM (MOBIC) 7.5 MG TABLET    Take 1 tablet by mouth daily    ONDANSETRON (ZOFRAN) 4 MG TABLET    Take 1 tablet by mouth every 8 hours as needed for Nausea     Controlled Substances Monitoring:     RX Monitoring Periodic Controlled Substance Monitoring   12/17/2021   4:07 PM No signs of potential drug abuse or diversion identified.        (Please note that portions of this note were completed with a voice recognition program.  Efforts were made to edit the dictations but occasionally words are mis-transcribed.)    Ita Grey PA-C (electronically signed)  Attending Emergency Physician      Ita Grey PA-C  10/24/23 1808

## 2023-10-24 NOTE — ED NOTES
The following labs were labeled with appropriate pt sticker and tubed to lab:     [] Blue     [x] Lavender   [] on ice  [x] Green/yellow  [] Green/black [] on ice  [] Rosalita Verona  [] on ice  [] Yellow  [] Red  [] Pink  [] Type/ Screen  [] ABG  [] VBG    [] COVID-19 swab    [] Rapid  [] PCR  [] Flu swab  [] Peds Viral Panel     [] Urine Sample  [] Fecal Sample  [] Pelvic Cultures  [] Blood Cultures  [] X 2  [] STREP Cultures       Wild Marshall RN  10/24/23 8636

## 2023-10-26 LAB
EKG ATRIAL RATE: 70 BPM
EKG P AXIS: 35 DEGREES
EKG P-R INTERVAL: 144 MS
EKG Q-T INTERVAL: 416 MS
EKG QRS DURATION: 78 MS
EKG QTC CALCULATION (BAZETT): 449 MS
EKG R AXIS: -2 DEGREES
EKG T AXIS: 36 DEGREES
EKG VENTRICULAR RATE: 70 BPM

## 2023-10-27 ENCOUNTER — OFFICE VISIT (OUTPATIENT)
Dept: CARDIOLOGY CLINIC | Age: 53
End: 2023-10-27

## 2023-10-27 VITALS
HEART RATE: 75 BPM | SYSTOLIC BLOOD PRESSURE: 118 MMHG | BODY MASS INDEX: 27.72 KG/M2 | HEIGHT: 64 IN | OXYGEN SATURATION: 99 % | DIASTOLIC BLOOD PRESSURE: 70 MMHG | WEIGHT: 162.4 LBS

## 2023-10-27 DIAGNOSIS — E78.5 HYPERLIPIDEMIA, UNSPECIFIED HYPERLIPIDEMIA TYPE: ICD-10-CM

## 2023-10-27 DIAGNOSIS — E78.5 DYSLIPIDEMIA: ICD-10-CM

## 2023-10-27 DIAGNOSIS — R42 DIZZINESS: ICD-10-CM

## 2023-10-27 DIAGNOSIS — R07.9 CHEST PAIN, UNSPECIFIED TYPE: ICD-10-CM

## 2023-10-27 DIAGNOSIS — I65.23 BILATERAL CAROTID ARTERY STENOSIS: ICD-10-CM

## 2023-10-27 DIAGNOSIS — Z98.890 HISTORY OF LEFT COMMON CAROTID ARTERY STENT PLACEMENT: ICD-10-CM

## 2023-10-27 DIAGNOSIS — I65.22 STENOSIS OF LEFT CAROTID ARTERY: ICD-10-CM

## 2023-10-27 DIAGNOSIS — Z95.828 HISTORY OF LEFT COMMON CAROTID ARTERY STENT PLACEMENT: ICD-10-CM

## 2023-10-27 DIAGNOSIS — I10 ESSENTIAL HYPERTENSION: ICD-10-CM

## 2023-10-27 DIAGNOSIS — I25.10 CORONARY ARTERY DISEASE INVOLVING NATIVE CORONARY ARTERY OF NATIVE HEART WITHOUT ANGINA PECTORIS: ICD-10-CM

## 2023-10-27 DIAGNOSIS — G45.9 TIA (TRANSIENT ISCHEMIC ATTACK): ICD-10-CM

## 2023-10-27 DIAGNOSIS — I21.11 ST ELEVATION MYOCARDIAL INFARCTION INVOLVING RIGHT CORONARY ARTERY (HCC): Primary | ICD-10-CM

## 2023-10-27 RX ORDER — ATORVASTATIN CALCIUM 80 MG/1
40 TABLET, FILM COATED ORAL NIGHTLY
Qty: 30 TABLET | Refills: 11
Start: 2023-10-27

## 2023-10-27 ASSESSMENT — ENCOUNTER SYMPTOMS
SHORTNESS OF BREATH: 0
RESPIRATORY NEGATIVE: 1
NAUSEA: 0
CHEST TIGHTNESS: 0
EYES NEGATIVE: 1
GASTROINTESTINAL NEGATIVE: 1
STRIDOR: 0
COUGH: 0
WHEEZING: 0
BLOOD IN STOOL: 0

## 2023-10-27 NOTE — PROGRESS NOTES
OFFICE VISIT        Patient: Emanuel Rees  YOB: 1970  MRN: [id number]    Chief Complaint: STEMI HTN HPL   Chief Complaint   Patient presents with    Follow-Up from [address]: 10/24 for chest pain    Discuss Medications     Atorvastatin: when taking it on empty stomach, pt was experiencing heartburn. Started taking it on a full stomach, symptoms have subsided       CV Data:  10/21 LE Art - negative   8/221 Echo EF 55-60 Trace MR  8/25/21 STEMI RCA Px ABELARDO. CX distal 70-75%   33/53 CUS % LICA >00   47/60/88 Left Carotid stent. %  53/79 CUS LICA stent Patent. %   7/23 SPECT Lat and Inferior scar. No ischemia. Subjective/HPI Dr. Lucero Eye pt changing due to insurance. Pt has had rare pressure when working as a . These symptoms are different than her MI symptoms. Compliant with meds. Pt was taken off statin for unknown reason. 11/12/21 asymptomatic but has severe carotid dz. LICA >33% and LIU occluded. occ chest heaviness. No sob. 36/22/86 had LICA Stent did well. Recent HTN and went to ER. Has been stable. No more headaches. No cp some sob but no worse. 11//30/21 called in due to uncontrolled BP worsea t night. No cp but occ heavy feeling. No sob. Little puffy under her eye. No bleed. 12/27/21 in ER for Stroke like symptoms. No cp no sob no falls no bleed    2/23/22 still has rash thought to be related to HCTZ. BP running low little woozy. No cp no sob no falls no bleed    5/23/22 doing better no cp no sob takes meds. 9/27/22 doing well no cp no sob rare LH no falls no bleed. 1/24/23 doing well walking no cp no sob no falls no bleed. 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     5/23/23 doing well no cp no sob occ Low BP low and symptomatic. LDL 75     9/18/23 last week has couple days of vertigo. Now resolved. Her BP ws little high during those days. No cp no sob active no edema no bleed.      10/27/23 ER 3 days ago due

## 2023-11-03 ENCOUNTER — OFFICE VISIT (OUTPATIENT)
Dept: FAMILY MEDICINE CLINIC | Age: 53
End: 2023-11-03
Payer: COMMERCIAL

## 2023-11-03 VITALS
HEIGHT: 64 IN | HEART RATE: 75 BPM | SYSTOLIC BLOOD PRESSURE: 128 MMHG | RESPIRATION RATE: 18 BRPM | DIASTOLIC BLOOD PRESSURE: 76 MMHG | BODY MASS INDEX: 26.46 KG/M2 | WEIGHT: 155 LBS | TEMPERATURE: 97.6 F | OXYGEN SATURATION: 100 %

## 2023-11-03 DIAGNOSIS — J06.9 URI WITH COUGH AND CONGESTION: Primary | ICD-10-CM

## 2023-11-03 LAB
Lab: NORMAL
PERFORMING INSTRUMENT: NORMAL
QC PASS/FAIL: NORMAL
SARS-COV-2, POC: NORMAL

## 2023-11-03 PROCEDURE — 1036F TOBACCO NON-USER: CPT | Performed by: NURSE PRACTITIONER

## 2023-11-03 PROCEDURE — G8484 FLU IMMUNIZE NO ADMIN: HCPCS | Performed by: NURSE PRACTITIONER

## 2023-11-03 PROCEDURE — 87426 SARSCOV CORONAVIRUS AG IA: CPT | Performed by: NURSE PRACTITIONER

## 2023-11-03 PROCEDURE — 3017F COLORECTAL CA SCREEN DOC REV: CPT | Performed by: NURSE PRACTITIONER

## 2023-11-03 PROCEDURE — 99213 OFFICE O/P EST LOW 20 MIN: CPT | Performed by: NURSE PRACTITIONER

## 2023-11-03 PROCEDURE — G8427 DOCREV CUR MEDS BY ELIG CLIN: HCPCS | Performed by: NURSE PRACTITIONER

## 2023-11-03 PROCEDURE — G8419 CALC BMI OUT NRM PARAM NOF/U: HCPCS | Performed by: NURSE PRACTITIONER

## 2023-11-03 RX ORDER — DEXTROMETHORPHAN POLISTIREX 30 MG/5ML
60 SUSPENSION ORAL 2 TIMES DAILY PRN
Qty: 148 ML | Refills: 0 | Status: SHIPPED | OUTPATIENT
Start: 2023-11-03 | End: 2023-11-13

## 2023-11-03 RX ORDER — AZITHROMYCIN 250 MG/1
TABLET, FILM COATED ORAL
Qty: 6 TABLET | Refills: 1 | Status: SHIPPED | OUTPATIENT
Start: 2023-11-03

## 2023-11-03 RX ORDER — GUAIFENESIN 600 MG/1
600 TABLET, EXTENDED RELEASE ORAL 2 TIMES DAILY
Qty: 30 TABLET | Refills: 0 | Status: SHIPPED | OUTPATIENT
Start: 2023-11-03 | End: 2023-11-18

## 2023-11-03 SDOH — ECONOMIC STABILITY: FOOD INSECURITY: WITHIN THE PAST 12 MONTHS, YOU WORRIED THAT YOUR FOOD WOULD RUN OUT BEFORE YOU GOT MONEY TO BUY MORE.: NEVER TRUE

## 2023-11-03 SDOH — ECONOMIC STABILITY: FOOD INSECURITY: WITHIN THE PAST 12 MONTHS, THE FOOD YOU BOUGHT JUST DIDN'T LAST AND YOU DIDN'T HAVE MONEY TO GET MORE.: NEVER TRUE

## 2023-11-03 SDOH — ECONOMIC STABILITY: INCOME INSECURITY: HOW HARD IS IT FOR YOU TO PAY FOR THE VERY BASICS LIKE FOOD, HOUSING, MEDICAL CARE, AND HEATING?: NOT HARD AT ALL

## 2023-11-03 SDOH — ECONOMIC STABILITY: HOUSING INSECURITY
IN THE LAST 12 MONTHS, WAS THERE A TIME WHEN YOU DID NOT HAVE A STEADY PLACE TO SLEEP OR SLEPT IN A SHELTER (INCLUDING NOW)?: NO

## 2023-11-03 ASSESSMENT — PATIENT HEALTH QUESTIONNAIRE - PHQ9
SUM OF ALL RESPONSES TO PHQ QUESTIONS 1-9: 0
1. LITTLE INTEREST OR PLEASURE IN DOING THINGS: 0
SUM OF ALL RESPONSES TO PHQ QUESTIONS 1-9: 0
SUM OF ALL RESPONSES TO PHQ9 QUESTIONS 1 & 2: 0
2. FEELING DOWN, DEPRESSED OR HOPELESS: 0

## 2023-11-03 NOTE — PROGRESS NOTES
Subjective  Mike Up, 48 y.o. female presents today with:  Chief Complaint   Patient presents with    Congestion     In chest x 5 days; started with ear and throat pain and now it's in chest        HPI  Presents to  cough and congestion   Started to feel unwell Saturday   Began as sore throat   Further developed cough/chest congestion   C/o chest tightness   Denies SOB  Denies chest pain   Denies N/V/D  Lessened appetite   Hydrating   Felt warm. Not monitoring temp. Taking Tylenol                     Past Medical History:   Diagnosis Date    Abnormal Pap smear of cervix     CAD (coronary artery disease)     Diverticular disease     Diverticulitis       Past Surgical History:   Procedure Laterality Date    CARDIAC SURGERY      stents (2)    DILATION AND CURETTAGE       Family History   Problem Relation Age of Onset    No Known Problems Father     No Known Problems Mother     No Known Problems Paternal Grandfather     No Known Problems Paternal Grandmother     No Known Problems Maternal Grandmother     No Known Problems Maternal Grandfather     No Known Problems Brother     Breast Cancer Sister     No Known Problems Other     Breast Cancer Paternal Aunt     Cancer Neg Hx     Colon Cancer Neg Hx     Diabetes Neg Hx     Eclampsia Neg Hx     Hypertension Neg Hx     Ovarian Cancer Neg Hx      Labor Neg Hx     Spont Abortions Neg Hx     Stroke Neg Hx        Review of Systems   Constitutional:  Positive for activity change, appetite change and fatigue. Negative for chills, diaphoresis and fever. HENT:  Positive for sore throat. Negative for congestion, rhinorrhea, sinus pressure and trouble swallowing. Ear pain: congested/full. Respiratory:  Positive for cough and chest tightness. Negative for shortness of breath. Gastrointestinal:  Negative for abdominal pain, diarrhea and nausea. Musculoskeletal:  Negative for myalgias.    Neurological:  Negative for dizziness, light-headedness and

## 2023-11-06 ASSESSMENT — ENCOUNTER SYMPTOMS
RHINORRHEA: 0
DIARRHEA: 0
COUGH: 1
SINUS PRESSURE: 0
TROUBLE SWALLOWING: 0
ABDOMINAL PAIN: 0
SORE THROAT: 1
CHEST TIGHTNESS: 1
SHORTNESS OF BREATH: 0
NAUSEA: 0

## 2023-11-30 ENCOUNTER — HOSPITAL ENCOUNTER (OUTPATIENT)
Dept: ULTRASOUND IMAGING | Age: 53
Discharge: HOME | End: 2023-12-02
Attending: INTERNAL MEDICINE
Payer: COMMERCIAL

## 2023-11-30 DIAGNOSIS — I65.22 STENOSIS OF LEFT CAROTID ARTERY: ICD-10-CM

## 2023-11-30 PROCEDURE — 93880 EXTRACRANIAL BILAT STUDY: CPT

## 2023-12-01 LAB
VAS LEFT CCA DIST EDV: 33.6 CM/S
VAS LEFT CCA DIST PSV: 89.7 CM/S
VAS LEFT CCA MID EDV: 36.4 CM/S
VAS LEFT CCA MID PSV: 84.8 CM/S
VAS LEFT CCA PROX EDV: 32.9 CM/S
VAS LEFT CCA PROX PSV: 103 CM/S
VAS LEFT ICA DIST EDV: 55.7 CM/S
VAS LEFT ICA DIST PSV: 132 CM/S
VAS LEFT ICA MID EDV: 57.2 CM/S
VAS LEFT ICA MID PSV: 136 CM/S
VAS LEFT ICA PROX EDV: 48.6 CM/S
VAS LEFT ICA PROX PSV: 131 CM/S
VAS LEFT VERTEBRAL EDV: 19.8 CM/S
VAS LEFT VERTEBRAL PSV: 65.3 CM/S
VAS RIGHT CCA DIST EDV: 20.4 CM/S
VAS RIGHT CCA DIST PSV: 107 CM/S
VAS RIGHT CCA MID EDV: 25.1 CM/S
VAS RIGHT CCA MID PSV: 114 CM/S
VAS RIGHT ECA EDV: 40.6 CM/S
VAS RIGHT ECA PSV: 166 CM/S
VAS RIGHT ICA MID EDV: 18.2 CM/S
VAS RIGHT ICA MID PSV: 65.9 CM/S
VAS RIGHT ICA PROX EDV: 1.4 CM/S
VAS RIGHT ICA PROX PSV: 60.9 CM/S

## 2024-01-19 ENCOUNTER — OFFICE VISIT (OUTPATIENT)
Age: 54
End: 2024-01-19
Payer: COMMERCIAL

## 2024-01-19 VITALS
TEMPERATURE: 97.7 F | WEIGHT: 162 LBS | BODY MASS INDEX: 27.66 KG/M2 | DIASTOLIC BLOOD PRESSURE: 60 MMHG | HEIGHT: 64 IN | SYSTOLIC BLOOD PRESSURE: 112 MMHG

## 2024-01-19 DIAGNOSIS — R42 DIZZINESS: Primary | ICD-10-CM

## 2024-01-19 PROCEDURE — 99202 OFFICE O/P NEW SF 15 MIN: CPT | Performed by: STUDENT IN AN ORGANIZED HEALTH CARE EDUCATION/TRAINING PROGRAM

## 2024-01-19 PROCEDURE — 1036F TOBACCO NON-USER: CPT | Performed by: STUDENT IN AN ORGANIZED HEALTH CARE EDUCATION/TRAINING PROGRAM

## 2024-01-19 PROCEDURE — 3017F COLORECTAL CA SCREEN DOC REV: CPT | Performed by: STUDENT IN AN ORGANIZED HEALTH CARE EDUCATION/TRAINING PROGRAM

## 2024-01-19 PROCEDURE — G8484 FLU IMMUNIZE NO ADMIN: HCPCS | Performed by: STUDENT IN AN ORGANIZED HEALTH CARE EDUCATION/TRAINING PROGRAM

## 2024-01-19 PROCEDURE — G8427 DOCREV CUR MEDS BY ELIG CLIN: HCPCS | Performed by: STUDENT IN AN ORGANIZED HEALTH CARE EDUCATION/TRAINING PROGRAM

## 2024-01-19 PROCEDURE — G8419 CALC BMI OUT NRM PARAM NOF/U: HCPCS | Performed by: STUDENT IN AN ORGANIZED HEALTH CARE EDUCATION/TRAINING PROGRAM

## 2024-01-19 NOTE — PROGRESS NOTES
present.      Mental Status: She is alert and oriented to person, place, and time.   Psychiatric:         Mood and Affect: Mood normal.         Behavior: Behavior normal.       Side effects, adverse effects of the medication prescribed today, as well as treatment plan/ rationale and result expectations have been discussed with the patient who expresses understanding and desires to proceed.  Close follow up to evaluate treatment results and for coordination of care.    I have reviewed the patient's medical history in detail and updated the computerized patient record.      Claudia Soto MD

## 2024-01-22 ENCOUNTER — OFFICE VISIT (OUTPATIENT)
Dept: CARDIOLOGY CLINIC | Age: 54
End: 2024-01-22
Payer: COMMERCIAL

## 2024-01-22 VITALS
BODY MASS INDEX: 28.1 KG/M2 | SYSTOLIC BLOOD PRESSURE: 124 MMHG | OXYGEN SATURATION: 99 % | WEIGHT: 164.6 LBS | DIASTOLIC BLOOD PRESSURE: 64 MMHG | HEART RATE: 70 BPM | HEIGHT: 64 IN

## 2024-01-22 DIAGNOSIS — Z98.890 HISTORY OF LEFT COMMON CAROTID ARTERY STENT PLACEMENT: ICD-10-CM

## 2024-01-22 DIAGNOSIS — I65.23 BILATERAL CAROTID ARTERY STENOSIS: ICD-10-CM

## 2024-01-22 DIAGNOSIS — I25.10 CORONARY ARTERY DISEASE INVOLVING NATIVE CORONARY ARTERY OF NATIVE HEART WITHOUT ANGINA PECTORIS: ICD-10-CM

## 2024-01-22 DIAGNOSIS — Z95.828 HISTORY OF LEFT COMMON CAROTID ARTERY STENT PLACEMENT: ICD-10-CM

## 2024-01-22 DIAGNOSIS — I10 ESSENTIAL HYPERTENSION: ICD-10-CM

## 2024-01-22 DIAGNOSIS — I21.11 ST ELEVATION MYOCARDIAL INFARCTION INVOLVING RIGHT CORONARY ARTERY (HCC): ICD-10-CM

## 2024-01-22 DIAGNOSIS — E78.5 DYSLIPIDEMIA: ICD-10-CM

## 2024-01-22 DIAGNOSIS — I65.22 STENOSIS OF LEFT CAROTID ARTERY: ICD-10-CM

## 2024-01-22 DIAGNOSIS — G45.9 TIA (TRANSIENT ISCHEMIC ATTACK): ICD-10-CM

## 2024-01-22 DIAGNOSIS — I10 HYPERTENSION, UNSPECIFIED TYPE: Primary | ICD-10-CM

## 2024-01-22 DIAGNOSIS — E78.5 HYPERLIPIDEMIA, UNSPECIFIED HYPERLIPIDEMIA TYPE: ICD-10-CM

## 2024-01-22 PROCEDURE — G8484 FLU IMMUNIZE NO ADMIN: HCPCS | Performed by: INTERNAL MEDICINE

## 2024-01-22 PROCEDURE — 3078F DIAST BP <80 MM HG: CPT | Performed by: INTERNAL MEDICINE

## 2024-01-22 PROCEDURE — G8427 DOCREV CUR MEDS BY ELIG CLIN: HCPCS | Performed by: INTERNAL MEDICINE

## 2024-01-22 PROCEDURE — 99214 OFFICE O/P EST MOD 30 MIN: CPT | Performed by: INTERNAL MEDICINE

## 2024-01-22 PROCEDURE — 1036F TOBACCO NON-USER: CPT | Performed by: INTERNAL MEDICINE

## 2024-01-22 PROCEDURE — 3017F COLORECTAL CA SCREEN DOC REV: CPT | Performed by: INTERNAL MEDICINE

## 2024-01-22 PROCEDURE — 3074F SYST BP LT 130 MM HG: CPT | Performed by: INTERNAL MEDICINE

## 2024-01-22 PROCEDURE — G8419 CALC BMI OUT NRM PARAM NOF/U: HCPCS | Performed by: INTERNAL MEDICINE

## 2024-01-22 ASSESSMENT — ENCOUNTER SYMPTOMS
CHEST TIGHTNESS: 0
STRIDOR: 0
GASTROINTESTINAL NEGATIVE: 1
BLOOD IN STOOL: 0
SHORTNESS OF BREATH: 0
RESPIRATORY NEGATIVE: 1
WHEEZING: 0
COUGH: 0
NAUSEA: 0
EYES NEGATIVE: 1

## 2024-01-22 NOTE — PROGRESS NOTES
native coronary artery of native heart without angina pectoris  CP - we will get TM SPECT off BB - negative reviewed w pt.     2. Essential hypertension  BP stable now. She will check at home and let us know.     3. Dyslipidemia  Statin 40 mary ellen well. Recheck Labs now.     4. ST elevation myocardial infarction involving right coronary artery (HCC)    5. Left Carotid stent- stable. - f/u CUS - stable.  F/u        Fasting labs.     Counseling:  Heart Healthy Lifestyle, Improve BMI, Low Salt Diet, Take Precautions to Prevent Falls and Walk Daily    Return in about 4 months (around 5/22/2024).    Electronically signed by HENRY CRABTREE MD on 1/22/2024 at 3:18 PM

## 2024-02-06 DIAGNOSIS — I10 HYPERTENSION, UNSPECIFIED TYPE: ICD-10-CM

## 2024-02-06 LAB
CHOLEST SERPL-MCNC: 163 MG/DL (ref 0–199)
HDLC SERPL-MCNC: 42 MG/DL (ref 40–59)
LDL CHOLESTEROL CALCULATED: 81 MG/DL (ref 0–129)
TRIGLYCERIDE, FASTING: 201 MG/DL (ref 0–150)

## 2024-02-07 ENCOUNTER — TELEPHONE (OUTPATIENT)
Dept: CARDIOLOGY CLINIC | Age: 54
End: 2024-02-07

## 2024-02-07 RX ORDER — ATORVASTATIN CALCIUM 80 MG/1
80 TABLET, FILM COATED ORAL NIGHTLY
Qty: 30 TABLET | Refills: 11
Start: 2024-02-07

## 2024-02-07 NOTE — TELEPHONE ENCOUNTER
----- Message from Mingo JEFF MD sent at 2/7/2024 11:08 AM EST -----  LDL is still not to gaol.  Advance Lipitor to 1 tab 80 mg qd from 1/2 tab  ----- Message -----  From: Roberto Pickard Incoming Lab Results From [facility]  Sent: 2/6/2024   3:31 PM EST  To: Mingo JEFF MD      Attempted to call patient to notify her of new dosage of Lipitor. No answer from patient. Voicemail left for patient with above information. Medication list updated.

## 2024-02-23 PROBLEM — I25.2 HISTORY OF ACUTE MYOCARDIAL INFARCTION: Status: ACTIVE | Noted: 2021-08-25

## 2024-03-04 ENCOUNTER — OFFICE VISIT (OUTPATIENT)
Dept: OBGYN CLINIC | Age: 54
End: 2024-03-04
Payer: COMMERCIAL

## 2024-03-04 VITALS
HEIGHT: 64 IN | BODY MASS INDEX: 28.51 KG/M2 | WEIGHT: 167 LBS | DIASTOLIC BLOOD PRESSURE: 70 MMHG | SYSTOLIC BLOOD PRESSURE: 130 MMHG

## 2024-03-04 DIAGNOSIS — Z12.4 CERVICAL CANCER SCREENING: ICD-10-CM

## 2024-03-04 DIAGNOSIS — Z01.419 ENCOUNTER FOR WELL WOMAN EXAM WITH ROUTINE GYNECOLOGICAL EXAM: ICD-10-CM

## 2024-03-04 DIAGNOSIS — Z11.51 SCREENING FOR HUMAN PAPILLOMAVIRUS: ICD-10-CM

## 2024-03-04 DIAGNOSIS — Z01.419 ENCOUNTER FOR WELL WOMAN EXAM WITH ROUTINE GYNECOLOGICAL EXAM: Primary | ICD-10-CM

## 2024-03-04 DIAGNOSIS — Z12.31 SCREENING MAMMOGRAM FOR BREAST CANCER: ICD-10-CM

## 2024-03-04 PROCEDURE — G8484 FLU IMMUNIZE NO ADMIN: HCPCS | Performed by: OBSTETRICS & GYNECOLOGY

## 2024-03-04 PROCEDURE — 99396 PREV VISIT EST AGE 40-64: CPT | Performed by: OBSTETRICS & GYNECOLOGY

## 2024-03-04 ASSESSMENT — ENCOUNTER SYMPTOMS
RECTAL PAIN: 0
RESPIRATORY NEGATIVE: 1
ALLERGIC/IMMUNOLOGIC NEGATIVE: 1
CONSTIPATION: 0
DIARRHEA: 0
ABDOMINAL DISTENTION: 0
EYES NEGATIVE: 1
NAUSEA: 0
ANAL BLEEDING: 0
ABDOMINAL PAIN: 0
VOMITING: 0
BLOOD IN STOOL: 0

## 2024-03-04 ASSESSMENT — PATIENT HEALTH QUESTIONNAIRE - PHQ9
2. FEELING DOWN, DEPRESSED OR HOPELESS: 0
SUM OF ALL RESPONSES TO PHQ QUESTIONS 1-9: 0
1. LITTLE INTEREST OR PLEASURE IN DOING THINGS: 0
SUM OF ALL RESPONSES TO PHQ QUESTIONS 1-9: 0
SUM OF ALL RESPONSES TO PHQ9 QUESTIONS 1 & 2: 0
SUM OF ALL RESPONSES TO PHQ QUESTIONS 1-9: 0
SUM OF ALL RESPONSES TO PHQ QUESTIONS 1-9: 0

## 2024-03-04 ASSESSMENT — VISUAL ACUITY: OU: 1

## 2024-03-04 NOTE — PROGRESS NOTES
The patient was asked if she would like a chaperone present for her intimate exam. She  Declined the chaperone. Aurelio Ruiz CMA (AAMA)    
appropriate ).    Gardisil counseling completed for all patients ( age appropriate ).    Routine health maintenance ( per PCP and guidelines ).

## 2024-03-09 LAB
HPV HR 12 DNA SPEC QL NAA+PROBE: NOT DETECTED
HPV16 DNA SPEC QL NAA+PROBE: NOT DETECTED
HPV16+18+H RISK 12 DNA SPEC-IMP: NORMAL
HPV18 DNA SPEC QL NAA+PROBE: NOT DETECTED

## 2024-03-20 ENCOUNTER — LAB (OUTPATIENT)
Dept: LAB | Facility: LAB | Age: 54
End: 2024-03-20
Payer: COMMERCIAL

## 2024-03-20 ENCOUNTER — TELEPHONE (OUTPATIENT)
Dept: PRIMARY CARE | Facility: CLINIC | Age: 54
End: 2024-03-20

## 2024-03-20 DIAGNOSIS — R73.01 IFG (IMPAIRED FASTING GLUCOSE): ICD-10-CM

## 2024-03-20 DIAGNOSIS — E78.2 MIXED HYPERLIPIDEMIA: ICD-10-CM

## 2024-03-20 DIAGNOSIS — I10 PRIMARY HYPERTENSION: Primary | ICD-10-CM

## 2024-03-20 NOTE — TELEPHONE ENCOUNTER
Advise Pt that labs were entered for her.  If she is going to do them at [facility], I will need to print out the orders for her.

## 2024-03-22 ENCOUNTER — LAB (OUTPATIENT)
Dept: LAB | Facility: LAB | Age: 54
End: 2024-03-22
Payer: COMMERCIAL

## 2024-03-25 ENCOUNTER — OFFICE VISIT (OUTPATIENT)
Dept: PRIMARY CARE | Facility: CLINIC | Age: 54
End: 2024-03-25
Payer: COMMERCIAL

## 2024-03-25 VITALS
OXYGEN SATURATION: 98 % | DIASTOLIC BLOOD PRESSURE: 78 MMHG | WEIGHT: 166 LBS | BODY MASS INDEX: 28.34 KG/M2 | HEART RATE: 65 BPM | HEIGHT: 64 IN | TEMPERATURE: 97.9 F | SYSTOLIC BLOOD PRESSURE: 130 MMHG

## 2024-03-25 DIAGNOSIS — R73.9 HYPERGLYCEMIA: ICD-10-CM

## 2024-03-25 DIAGNOSIS — I10 PRIMARY HYPERTENSION: Primary | ICD-10-CM

## 2024-03-25 DIAGNOSIS — I25.10 CORONARY ARTERY DISEASE INVOLVING NATIVE HEART WITHOUT ANGINA PECTORIS, UNSPECIFIED VESSEL OR LESION TYPE: ICD-10-CM

## 2024-03-25 DIAGNOSIS — I65.22 STENOSIS OF LEFT CAROTID ARTERY: ICD-10-CM

## 2024-03-25 DIAGNOSIS — I65.21 OCCLUSION OF RIGHT CAROTID ARTERY: ICD-10-CM

## 2024-03-25 DIAGNOSIS — E78.2 MIXED HYPERLIPIDEMIA: ICD-10-CM

## 2024-03-25 PROBLEM — Z86.39 HISTORY OF ELEVATED LIPIDS: Status: ACTIVE | Noted: 2024-03-25

## 2024-03-25 PROBLEM — I25.2 HISTORY OF ACUTE MYOCARDIAL INFARCTION: Status: ACTIVE | Noted: 2021-08-25

## 2024-03-25 PROCEDURE — 3075F SYST BP GE 130 - 139MM HG: CPT | Performed by: FAMILY MEDICINE

## 2024-03-25 PROCEDURE — 99214 OFFICE O/P EST MOD 30 MIN: CPT | Performed by: FAMILY MEDICINE

## 2024-03-25 PROCEDURE — 3078F DIAST BP <80 MM HG: CPT | Performed by: FAMILY MEDICINE

## 2024-03-25 NOTE — PROGRESS NOTES
"Subjective   Patient ID: Maria Fernanda Glasgow is a 53 y.o. female who presents for Follow-up.    HPI     No new concerns.  Pt had dose of atorvastatin increased to 80 mg since last seen.       Pt has HTN  She occasionally checks her BP at home with similar readings to office.     Patient has hyperlipidemia.  She tries to limit the amount of fatty foods and high cholesterol foods that are consumed.  Patient is compliant with stain therapy and denies any noted side effects.     Pt quit smoking after her MI on 8/25/21.    Review of Systems  Constitutional: Patient denies any fever, chills, loss of appetite, or unexplained weight loss.  Cardiovascular: Patient denies any chest pain, shortness of breath with exertion, tachycardia, palpitations, orthopnea, or paroxysmal nocturnal dyspnea.  Respiratory: Patient denies any cough, shortness breath, or wheezing.  Gastrointestinal: Patient denies any nausea, vomiting, diarrhea, constipation, melena, hematochezia, or reflux symptoms.  Skin: Denies any rashes or skin lesions.   Neurology: Patient denies any new motor or sensory losses.  Denies any numbness, tingling, weakness, and incoordination of the extremities.  Patient also denies any tremor, seizures, or gait instability.  Endocrinology: Denies any polyuria, polydipsia, polyphagia, or heat/cold intolerance.      Objective   /78   Pulse 65   Temp 36.6 °C (97.9 °F)   Ht 1.626 m (5' 4\")   Wt 75.3 kg (166 lb)   SpO2 98%   BMI 28.49 kg/m²     Physical Exam  General Appearance: Alert and cooperative, in no acute distress, well-developed/well-nourished.  Neck: Supple and without adenopathy or rigidity.  There is no JVD at 90° and no carotid bruits are noted.  There is no thyromegaly, thyroid tenderness, or palpable thyroid nodules.  Heart: Regular rate and rhythm without murmur or ectopy.  Respiratory: Lungs are clear to auscultation bilaterally with good air exchange.  Good respiratory effort and no accessory muscle " use.  Skin: Good turgor, moist, warm and without rashes or lesions.  Neurological exam: Alert and oriented ×3, no tremor, normal gait.  Extremities: No clubbing, cyanosis, or edema      Assessment/Plan     HTN: Blood pressure appears adequately controlled and we will continue with the current antihypertensive therapy.     Hyperlipidemia: Stable based on last labs.   Continue with the current medication.   Dietary changes, exercise, and maintenance of healthy weight were discussed at length.     Coronary artery disease:   Stable based on symptoms.  Patient is without worrisome signs or symptoms for unstable angina.   Risk factor modification was discussed at length.   Dietary changes, exercise and maintenance of a healthy weight were discussed.  Patient had a MI 21 and underwent 2 stents (Dr. Parra).  She follows with Dr. Jackson (cardiology) at [facility].     Left carotid artery stenosis / Right carotid artery occlusion: She had a left carotid artery stent on 11/15/21 and the RIGHT carotid artery was found to be 100% blocked.     Hyperglycemia:  We will check a hemoglobin A1c with her next labs in 6 months.      Colon cancer screenin2023:  PT WAS CLEARED BY CARDIOLOGY TO HAVE COLONOSCOPY AND TOLD TO STOP PLAVIX 1 WEEK BEFORE THE PROCEDURE.  PT HAS NOT SCHEDULED THE PROCEDURE YET.      LAST COLONOSCOPY DONE BEFORE   MAMM DUE 3/4/2024           Orders Placed This Encounter   Procedures    Hemoglobin A1C    Comprehensive Metabolic Panel    Lipid Panel    Comprehensive Metabolic Panel    Hemoglobin A1C     Patient to continue the current medications without change:  Current Outpatient Medications   Medication Instructions    ascorbic acid (Vitamin C) 500 mg tablet Vitamin C 500 MG Oral Tablet   Refills: 0        Start : 1-Sep-2021   Active    aspirin 81 mg chewable tablet Aspirin 81 MG Oral Tablet Chewable   Refills: 0        Start : 26-Aug-2021   Active    atorvastatin (LIPITOR) 40 mg,  oral, Daily    b complex vitamins capsule B Complex Oral Capsule   Refills: 0        Start : 1-Sep-2021   Active    carvedilol (Coreg) 3.125 mg tablet 1 tablet, 2 times daily with meals    clopidogrel (Plavix) 75 mg tablet 1 tablet, oral, Daily    lisinopril 5 mg tablet 1 tablet, oral, Daily    nitroglycerin (Nitrostat) 0.4 mg SL tablet Nitroglycerin 0.4 MG Sublingual Tablet Sublingual   Refills: 0        Start : 26-Aug-2021   Active

## 2024-03-25 NOTE — PATIENT INSTRUCTIONS
Colonoscopy recommended.  Please let us now when you are ready to proceed and a referral can be done for you.    Follow up in 6 months with labs to be done PRIOR.      We are expecting to start seeing patients at our new location on 5/1/2024.  Our new address will be:  [address] [place] [id number]         It was a pleasure to see you today. Thank you for choosing us for your health care needs.    If you have lab or other testing completed and have not been informed of results within one week, please call the office for your results.    If you haven't done so, consider signing up for [facility] [employer], the [facility] personal health record. Ask the staff how you can get started.

## 2024-03-26 ENCOUNTER — HOSPITAL ENCOUNTER (OUTPATIENT)
Dept: WOMENS IMAGING | Age: 54
Discharge: HOME | End: 2024-03-28
Attending: OBSTETRICS & GYNECOLOGY
Payer: COMMERCIAL

## 2024-03-26 DIAGNOSIS — Z12.31 SCREENING MAMMOGRAM FOR BREAST CANCER: ICD-10-CM

## 2024-03-26 PROCEDURE — 77063 BREAST TOMOSYNTHESIS BI: CPT

## 2024-05-14 DIAGNOSIS — I10 HYPERTENSION, UNSPECIFIED TYPE: ICD-10-CM

## 2024-05-14 LAB
ALBUMIN SERPL-MCNC: 4.5 G/DL (ref 3.5–4.6)
ALP SERPL-CCNC: 107 U/L (ref 40–130)
ALT SERPL-CCNC: 31 U/L (ref 0–33)
ANION GAP SERPL CALCULATED.3IONS-SCNC: 10 MEQ/L (ref 9–15)
AST SERPL-CCNC: 20 U/L (ref 0–35)
BILIRUB SERPL-MCNC: 0.4 MG/DL (ref 0.2–0.7)
BUN SERPL-MCNC: 14 MG/DL (ref 6–20)
CALCIUM SERPL-MCNC: 9.4 MG/DL (ref 8.5–9.9)
CHLORIDE SERPL-SCNC: 105 MEQ/L (ref 95–107)
CHOLEST SERPL-MCNC: 138 MG/DL (ref 0–199)
CO2 SERPL-SCNC: 27 MEQ/L (ref 20–31)
CREAT SERPL-MCNC: 0.77 MG/DL (ref 0.5–0.9)
ERYTHROCYTE [DISTWIDTH] IN BLOOD BY AUTOMATED COUNT: 14.2 % (ref 11.5–14.5)
GLOBULIN SER CALC-MCNC: 2.8 G/DL (ref 2.3–3.5)
GLUCOSE SERPL-MCNC: 103 MG/DL (ref 70–99)
HCT VFR BLD AUTO: 42.6 % (ref 37–47)
HDLC SERPL-MCNC: 36 MG/DL (ref 40–59)
HGB BLD-MCNC: 13.8 G/DL (ref 12–16)
LDLC SERPL CALC-MCNC: 76 MG/DL (ref 0–129)
MAGNESIUM SERPL-MCNC: 2.3 MG/DL (ref 1.7–2.4)
MCH RBC QN AUTO: 27.3 PG (ref 27–31.3)
MCHC RBC AUTO-ENTMCNC: 32.4 % (ref 33–37)
MCV RBC AUTO: 84.2 FL (ref 79.4–94.8)
PLATELET # BLD AUTO: 307 K/UL (ref 130–400)
POTASSIUM SERPL-SCNC: 4.4 MEQ/L (ref 3.4–4.9)
PROT SERPL-MCNC: 7.3 G/DL (ref 6.3–8)
RBC # BLD AUTO: 5.06 M/UL (ref 4.2–5.4)
SODIUM SERPL-SCNC: 142 MEQ/L (ref 135–144)
TRIGL SERPL-MCNC: 130 MG/DL (ref 0–150)
TSH SERPL-MCNC: 1.31 UIU/ML (ref 0.44–3.86)
WBC # BLD AUTO: 5.5 K/UL (ref 4.8–10.8)

## 2024-05-15 LAB
ESTIMATED AVERAGE GLUCOSE: 131 MG/DL
HBA1C MFR BLD: 6.2 % (ref 4–6)

## 2024-05-28 ENCOUNTER — OFFICE VISIT (OUTPATIENT)
Dept: CARDIOLOGY CLINIC | Age: 54
End: 2024-05-28
Payer: COMMERCIAL

## 2024-05-28 VITALS
OXYGEN SATURATION: 96 % | SYSTOLIC BLOOD PRESSURE: 120 MMHG | DIASTOLIC BLOOD PRESSURE: 60 MMHG | BODY MASS INDEX: 28.15 KG/M2 | HEART RATE: 63 BPM | WEIGHT: 164 LBS

## 2024-05-28 DIAGNOSIS — I10 ESSENTIAL HYPERTENSION: ICD-10-CM

## 2024-05-28 DIAGNOSIS — I25.10 CORONARY ARTERY DISEASE INVOLVING NATIVE CORONARY ARTERY OF NATIVE HEART WITHOUT ANGINA PECTORIS: ICD-10-CM

## 2024-05-28 DIAGNOSIS — I65.22 STENOSIS OF LEFT CAROTID ARTERY: ICD-10-CM

## 2024-05-28 DIAGNOSIS — Z98.890 HISTORY OF LEFT COMMON CAROTID ARTERY STENT PLACEMENT: ICD-10-CM

## 2024-05-28 DIAGNOSIS — G45.9 TIA (TRANSIENT ISCHEMIC ATTACK): ICD-10-CM

## 2024-05-28 DIAGNOSIS — E78.5 HYPERLIPIDEMIA, UNSPECIFIED HYPERLIPIDEMIA TYPE: ICD-10-CM

## 2024-05-28 DIAGNOSIS — I21.11 ST ELEVATION MYOCARDIAL INFARCTION INVOLVING RIGHT CORONARY ARTERY (HCC): ICD-10-CM

## 2024-05-28 DIAGNOSIS — I65.23 BILATERAL CAROTID ARTERY STENOSIS: ICD-10-CM

## 2024-05-28 DIAGNOSIS — I10 HYPERTENSION, UNSPECIFIED TYPE: Primary | ICD-10-CM

## 2024-05-28 DIAGNOSIS — Z95.828 HISTORY OF LEFT COMMON CAROTID ARTERY STENT PLACEMENT: ICD-10-CM

## 2024-05-28 DIAGNOSIS — E78.5 DYSLIPIDEMIA: ICD-10-CM

## 2024-05-28 PROCEDURE — 3078F DIAST BP <80 MM HG: CPT | Performed by: INTERNAL MEDICINE

## 2024-05-28 PROCEDURE — 3017F COLORECTAL CA SCREEN DOC REV: CPT | Performed by: INTERNAL MEDICINE

## 2024-05-28 PROCEDURE — G8419 CALC BMI OUT NRM PARAM NOF/U: HCPCS | Performed by: INTERNAL MEDICINE

## 2024-05-28 PROCEDURE — G8427 DOCREV CUR MEDS BY ELIG CLIN: HCPCS | Performed by: INTERNAL MEDICINE

## 2024-05-28 PROCEDURE — 99214 OFFICE O/P EST MOD 30 MIN: CPT | Performed by: INTERNAL MEDICINE

## 2024-05-28 PROCEDURE — 3074F SYST BP LT 130 MM HG: CPT | Performed by: INTERNAL MEDICINE

## 2024-05-28 PROCEDURE — 1036F TOBACCO NON-USER: CPT | Performed by: INTERNAL MEDICINE

## 2024-05-28 ASSESSMENT — ENCOUNTER SYMPTOMS
BLOOD IN STOOL: 0
SHORTNESS OF BREATH: 0
WHEEZING: 0
CHEST TIGHTNESS: 0
EYES NEGATIVE: 1
COUGH: 0
GASTROINTESTINAL NEGATIVE: 1
RESPIRATORY NEGATIVE: 1
STRIDOR: 0
NAUSEA: 0

## 2024-05-28 NOTE — PROGRESS NOTES
02:14 PM    GFRAA >60.0 09/26/2022 03:01 PM    LABGLOM >90.0 05/14/2024 02:14 PM    LABGLOM >60.0 10/24/2023 03:30 PM    GLUCOSE 103 05/14/2024 02:14 PM    CALCIUM 9.4 05/14/2024 02:14 PM    BILITOT 0.4 05/14/2024 02:14 PM    ALKPHOS 107 05/14/2024 02:14 PM    AST 20 05/14/2024 02:14 PM    ALT 31 05/14/2024 02:14 PM     BMP:    Lab Results   Component Value Date/Time     05/14/2024 02:14 PM    K 4.4 05/14/2024 02:14 PM     05/14/2024 02:14 PM    CO2 27 05/14/2024 02:14 PM    BUN 14 05/14/2024 02:14 PM    CREATININE 0.77 05/14/2024 02:14 PM    CALCIUM 9.4 05/14/2024 02:14 PM    GFRAA >60.0 09/26/2022 03:01 PM    LABGLOM >90.0 05/14/2024 02:14 PM    LABGLOM >60.0 10/24/2023 03:30 PM    GLUCOSE 103 05/14/2024 02:14 PM     Magnesium:    Lab Results   Component Value Date/Time    MG 2.3 05/14/2024 02:14 PM     TSH:  Lab Results   Component Value Date    TSH 1.310 05/14/2024             Patient Active Problem List   Diagnosis    Diverticulosis    Irregular periods    Uterine leiomyoma    Acne    Acute gastritis    Generalized abdominal pain    DUB (dysfunctional uterine bleeding)    Tobacco abuse    History of acute myocardial infarction    Carotid occlusion, right    Hyperperfusion syndrome, status post carotid endarterectomy    Cerebrovascular accident (CVA) due to stenosis of left middle cerebral artery (HCC)    Presence of internal carotid stent    Callus    Inflamed seborrheic keratosis    Pain of toe of right foot    Rosacea       There are no discontinued medications.          Modified Medications    No medications on file       No orders of the defined types were placed in this encounter.         Assessment/Plan:    1. Coronary artery disease involving native coronary artery of native heart without angina pectoris  CP - we will get TM SPECT off BB - negative reviewed w pt.     2. Essential hypertension  BP stable now. She will check at home and let us know.     3. Dyslipidemia  Statin 40 mary ellen well.

## 2024-09-23 ENCOUNTER — APPOINTMENT (OUTPATIENT)
Dept: PRIMARY CARE | Facility: CLINIC | Age: 54
End: 2024-09-23
Payer: COMMERCIAL

## 2024-09-23 VITALS
OXYGEN SATURATION: 99 % | DIASTOLIC BLOOD PRESSURE: 76 MMHG | TEMPERATURE: 98.2 F | HEART RATE: 62 BPM | WEIGHT: 157 LBS | SYSTOLIC BLOOD PRESSURE: 134 MMHG | HEIGHT: 64 IN | BODY MASS INDEX: 26.8 KG/M2

## 2024-09-23 DIAGNOSIS — R73.01 IFG (IMPAIRED FASTING GLUCOSE): ICD-10-CM

## 2024-09-23 DIAGNOSIS — I65.22 STENOSIS OF LEFT CAROTID ARTERY: ICD-10-CM

## 2024-09-23 DIAGNOSIS — E78.2 MIXED HYPERLIPIDEMIA: ICD-10-CM

## 2024-09-23 DIAGNOSIS — I65.21 OCCLUSION OF RIGHT CAROTID ARTERY: ICD-10-CM

## 2024-09-23 DIAGNOSIS — I10 PRIMARY HYPERTENSION: Primary | ICD-10-CM

## 2024-09-23 DIAGNOSIS — I25.10 CORONARY ARTERY DISEASE INVOLVING NATIVE HEART WITHOUT ANGINA PECTORIS, UNSPECIFIED VESSEL OR LESION TYPE: ICD-10-CM

## 2024-09-23 DIAGNOSIS — R53.83 FATIGUE, UNSPECIFIED TYPE: ICD-10-CM

## 2024-09-23 PROCEDURE — 3075F SYST BP GE 130 - 139MM HG: CPT | Performed by: FAMILY MEDICINE

## 2024-09-23 PROCEDURE — 99214 OFFICE O/P EST MOD 30 MIN: CPT | Performed by: FAMILY MEDICINE

## 2024-09-23 PROCEDURE — 3008F BODY MASS INDEX DOCD: CPT | Performed by: FAMILY MEDICINE

## 2024-09-23 PROCEDURE — 3078F DIAST BP <80 MM HG: CPT | Performed by: FAMILY MEDICINE

## 2024-09-23 ASSESSMENT — PATIENT HEALTH QUESTIONNAIRE - PHQ9
SUM OF ALL RESPONSES TO PHQ9 QUESTIONS 1 AND 2: 0
1. LITTLE INTEREST OR PLEASURE IN DOING THINGS: NOT AT ALL
2. FEELING DOWN, DEPRESSED OR HOPELESS: NOT AT ALL

## 2024-09-23 NOTE — PROGRESS NOTES
"Subjective   Patient ID: Maria Fernanda Glasgow is a 53 y.o. female who presents for Follow-up.    HPI     she is experiencing some fatigue lately.    No recent BW- labs previously ordered were not completed; patient will do them this week before her appointment with Dr. Jackson (cardiology)  Mammo: 03/2024  Pap: 01/15/2021    Pt has HTN  She occasionally checks her BP at home with similar readings to office.  Patient reports walking 3x a week     Patient has hyperlipidemia.  She tries to limit the amount of fatty foods and high cholesterol foods that are consumed.  Patient is compliant with stain therapy and denies any noted side effects.     Patient quit smoking after her MI on 8/25/21.    Patient reports being irritable and exhausted. She is unsure if this is caused by her work schedule.     Review of Systems    Cardiovascular: Patient denies any chest pain, shortness of breath with exertion, tachycardia, palpitations, orthopnea, or paroxysmal nocturnal dyspnea.  Respiratory: Patient denies any cough, shortness breath, or wheezing.  Gastrointestinal: Patient denies any nausea, vomiting, diarrhea, constipation, melena, hematochezia, or reflux symptoms  Skin: Denies any rashes or skin lesions  Neurology: Patient denies any new motor or sensory losses. Denies any numbness, tingling, weakness, and incoordination of the extremities. Patient also denies any tremor, seizures, or gait instability.  Endocrinology: Denies any polyuria, polydipsia, polyphagia, or heat/cold intolerance.  Psychiatric: He denies any depression, or suicidal/homicidal ideation. Anxiety symptoms have been stable with the current medication.    Objective   /76   Pulse 62   Temp 36.8 °C (98.2 °F) (Temporal)   Ht 1.626 m (5' 4\")   Wt 71.2 kg (157 lb)   SpO2 99%   BMI 26.95 kg/m²     Physical Exam    General Appearance: Alert and cooperative, in no acute distress, well-developed/well-nourished female  Neck: Supple and without adenopathy or rigidity. " There is no JVD at 90° and no carotid bruits are noted. There is no thyromegaly, thyroid tenderness, or palpable thyroid nodules.  Heart: Regular rate and rhythm without murmur or ectopy.  Lungs: Clear to auscultation bilaterally with good air exchange.  Skin: Good turgor, moist, warm and without rashes or lesions.  Neurological exam: Alert and oriented ×3, no tremor, normal gait.  Extremities: No clubbing, cyanosis, or edema  Psychiatric: Appropriate mood and affect, good insight and judgment, no delusions or thought disorders, no suicidal or homicidal ideation    Assessment/Plan     HTN:   Blood pressure appears adequately controlled and we will continue with the current antihypertensive therapy.     Hyperlipidemia:   Stable based on last labs.   Continue with the current medication.   Dietary changes, exercise, and maintenance of healthy weight were discussed at length.     Coronary artery disease:   Stable based on symptoms.  Patient is without worrisome signs or symptoms for unstable angina.   Risk factor modification was discussed at length.   Dietary changes, exercise and maintenance of a healthy weight were discussed.  Patient had a MI 21 and underwent 2 stents (Dr. Parra).  She follows with Dr. Jackson (cardiology) at [facility] regularly as well.     Left carotid artery stenosis / Right carotid artery occlusion:   She had a LEFT carotid artery stent on 11/15/21 and the RIGHT carotid artery was found to be 100% blocked.  Will need continued monitoring and continued aggressive treatment of the BP/lipids     IFG:  Lab Results   Component Value Date    HGBA1C 6.2 (H) 2024   Stable based on labs.  Dietary changes, exercise, and maintenance of a healthy weight were discussed at length.    Fatigue:  She has been experiencing some fatigue.  Labs have been needed for further evaluation.        Need for Colon cancer screenin2023:  PATIENT WAS CLEARED BY CARDIOLOGY TO HAVE  COLONOSCOPY AND TOLD TO STOP PLAVIX 1 WEEK BEFORE THE PROCEDURE.  PATIENT HAS NOT SCHEDULED THE PROCEDURE YET.   9/23/2024:  PT WAS AGAIN ENCOURAGED TO SCHEDULE COLONOSCOPY.         LAST COLONOSCOPY DONE BEFORE 2013  MAMM DUE 3/4/2024      Influenza vaccine offered, patient declined.    Follow up in six months or sooner if needed.    Scribe Attestation  By signing my name below, ILita Scribe   attest that this documentation has been prepared under the direction and in the presence of Kavon Taylor DO.    Orders Placed This Encounter   Procedures    Comprehensive Metabolic Panel    Lipid Panel    Hemoglobin A1C    CBC and Auto Differential    Hemoglobin A1C    Basic Metabolic Panel    Lipid Panel

## 2024-09-23 NOTE — PATIENT INSTRUCTIONS
Follow up in 6 months with labs to be done PRIOR.    It was a pleasure to see you today. Thank you for choosing us for your health care needs.    If you have lab or other testing completed and have not been informed of results within one week, please call the office for your results.    If you haven't done so, consider signing up for [email or web address], the [facility] personal health record. Ask the staff how you can get started.

## 2024-09-26 DIAGNOSIS — I21.11 ST ELEVATION MYOCARDIAL INFARCTION INVOLVING RIGHT CORONARY ARTERY (HCC): ICD-10-CM

## 2024-09-26 DIAGNOSIS — I10 ESSENTIAL HYPERTENSION: ICD-10-CM

## 2024-09-26 RX ORDER — ASPIRIN 81 MG
TABLET,CHEWABLE ORAL
Qty: 90 TABLET | Refills: 3 | Status: SHIPPED | OUTPATIENT
Start: 2024-09-26

## 2024-09-26 RX ORDER — LISINOPRIL 5 MG/1
5 TABLET ORAL DAILY
Qty: 90 TABLET | Refills: 3 | Status: SHIPPED | OUTPATIENT
Start: 2024-09-26

## 2024-09-26 RX ORDER — CARVEDILOL 3.12 MG/1
TABLET ORAL
Qty: 180 TABLET | Refills: 3 | Status: SHIPPED | OUTPATIENT
Start: 2024-09-26

## 2024-09-26 RX ORDER — CLOPIDOGREL BISULFATE 75 MG/1
75 TABLET ORAL DAILY
Qty: 90 TABLET | Refills: 3 | Status: SHIPPED | OUTPATIENT
Start: 2024-09-26

## 2024-09-26 RX ORDER — ATORVASTATIN CALCIUM 80 MG/1
80 TABLET, FILM COATED ORAL NIGHTLY
Qty: 90 TABLET | Refills: 3 | Status: SHIPPED | OUTPATIENT
Start: 2024-09-26

## 2024-09-30 ENCOUNTER — OFFICE VISIT (OUTPATIENT)
Dept: CARDIOLOGY CLINIC | Age: 54
End: 2024-09-30
Payer: COMMERCIAL

## 2024-09-30 VITALS
OXYGEN SATURATION: 98 % | WEIGHT: 152.8 LBS | BODY MASS INDEX: 26.23 KG/M2 | HEART RATE: 69 BPM | SYSTOLIC BLOOD PRESSURE: 118 MMHG | DIASTOLIC BLOOD PRESSURE: 60 MMHG

## 2024-09-30 DIAGNOSIS — I25.10 CORONARY ARTERY DISEASE INVOLVING NATIVE CORONARY ARTERY OF NATIVE HEART WITHOUT ANGINA PECTORIS: ICD-10-CM

## 2024-09-30 DIAGNOSIS — E78.5 DYSLIPIDEMIA: ICD-10-CM

## 2024-09-30 DIAGNOSIS — E78.5 HYPERLIPIDEMIA, UNSPECIFIED HYPERLIPIDEMIA TYPE: ICD-10-CM

## 2024-09-30 DIAGNOSIS — Z98.890 HISTORY OF LEFT COMMON CAROTID ARTERY STENT PLACEMENT: ICD-10-CM

## 2024-09-30 DIAGNOSIS — I21.11 ST ELEVATION MYOCARDIAL INFARCTION INVOLVING RIGHT CORONARY ARTERY (HCC): Primary | ICD-10-CM

## 2024-09-30 DIAGNOSIS — I10 ESSENTIAL HYPERTENSION: ICD-10-CM

## 2024-09-30 DIAGNOSIS — I65.23 BILATERAL CAROTID ARTERY STENOSIS: ICD-10-CM

## 2024-09-30 DIAGNOSIS — I65.22 STENOSIS OF LEFT CAROTID ARTERY: ICD-10-CM

## 2024-09-30 DIAGNOSIS — Z95.828 HISTORY OF LEFT COMMON CAROTID ARTERY STENT PLACEMENT: ICD-10-CM

## 2024-09-30 PROCEDURE — 1036F TOBACCO NON-USER: CPT | Performed by: INTERNAL MEDICINE

## 2024-09-30 PROCEDURE — 3017F COLORECTAL CA SCREEN DOC REV: CPT | Performed by: INTERNAL MEDICINE

## 2024-09-30 PROCEDURE — G8419 CALC BMI OUT NRM PARAM NOF/U: HCPCS | Performed by: INTERNAL MEDICINE

## 2024-09-30 PROCEDURE — G8427 DOCREV CUR MEDS BY ELIG CLIN: HCPCS | Performed by: INTERNAL MEDICINE

## 2024-09-30 PROCEDURE — 3078F DIAST BP <80 MM HG: CPT | Performed by: INTERNAL MEDICINE

## 2024-09-30 PROCEDURE — 3074F SYST BP LT 130 MM HG: CPT | Performed by: INTERNAL MEDICINE

## 2024-09-30 PROCEDURE — 99214 OFFICE O/P EST MOD 30 MIN: CPT | Performed by: INTERNAL MEDICINE

## 2024-09-30 RX ORDER — LISINOPRIL 5 MG/1
5 TABLET ORAL DAILY
Qty: 90 TABLET | Refills: 3 | Status: SHIPPED | OUTPATIENT
Start: 2024-09-30

## 2024-09-30 RX ORDER — ATORVASTATIN CALCIUM 80 MG/1
80 TABLET, FILM COATED ORAL NIGHTLY
Qty: 90 TABLET | Refills: 3 | Status: SHIPPED | OUTPATIENT
Start: 2024-09-30

## 2024-09-30 RX ORDER — ASCORBIC ACID 500 MG
500 TABLET ORAL DAILY
Qty: 90 TABLET | Refills: 3 | Status: SHIPPED | OUTPATIENT
Start: 2024-09-30

## 2024-09-30 RX ORDER — CLOPIDOGREL BISULFATE 75 MG/1
75 TABLET ORAL DAILY
Qty: 90 TABLET | Refills: 3 | Status: SHIPPED | OUTPATIENT
Start: 2024-09-30

## 2024-09-30 RX ORDER — VITAMIN B COMPLEX
1 CAPSULE ORAL DAILY
Qty: 90 CAPSULE | Refills: 3 | Status: SHIPPED | OUTPATIENT
Start: 2024-09-30

## 2024-09-30 RX ORDER — CARVEDILOL 3.12 MG/1
TABLET ORAL
Qty: 180 TABLET | Refills: 3 | Status: SHIPPED | OUTPATIENT
Start: 2024-09-30

## 2024-09-30 RX ORDER — NITROGLYCERIN 0.4 MG/1
TABLET SUBLINGUAL
Qty: 25 TABLET | Refills: 3 | Status: SHIPPED | OUTPATIENT
Start: 2024-09-30

## 2024-09-30 ASSESSMENT — ENCOUNTER SYMPTOMS
EYES NEGATIVE: 1
COUGH: 0
CHEST TIGHTNESS: 0
SHORTNESS OF BREATH: 0
RESPIRATORY NEGATIVE: 1
GASTROINTESTINAL NEGATIVE: 1
STRIDOR: 0
WHEEZING: 0
NAUSEA: 0
BLOOD IN STOOL: 0

## 2024-09-30 NOTE — PROGRESS NOTES
Resource Strain (CARDIA)     Difficulty of Paying Living Expenses: Not hard at all   Transportation Needs: Unknown (11/3/2023)    PRAPARE - Transportation     Lack of Transportation (Non-Medical): No   Housing Stability: Unknown (11/3/2023)    Housing Stability Vital Sign     Unstable Housing in the Last Year: No       Allergies   Allergen Reactions    Sulfa Antibiotics Rash       Current Outpatient Medications   Medication Sig Dispense Refill    carvedilol (COREG) 3.125 MG tablet TAKE 1 TABLET BY MOUTH TWICE DAILY 180 tablet 3    nitroGLYCERIN (NITROSTAT) 0.4 MG SL tablet up to max of 3 total doses. If no relief after 1 dose, call 911. 25 tablet 3    lisinopril (PRINIVIL;ZESTRIL) 5 MG tablet Take 1 tablet by mouth daily 90 tablet 3    clopidogrel (PLAVIX) 75 MG tablet Take 1 tablet by mouth daily 90 tablet 3    atorvastatin (LIPITOR) 80 MG tablet Take 1 tablet by mouth at bedtime 90 tablet 3    B Complex CAPS Take 1 capsule by mouth daily 90 capsule 3    ascorbic acid (VITAMIN C) 500 MG tablet Take 1 tablet by mouth daily 90 tablet 3    ASPIRIN LOW DOSE 81 MG chewable tablet CHEW AND SWALLOW 1 (ONE) TABLET BY MOUTH ONCE DAILY 90 tablet 3    acetaminophen (TYLENOL) 500 MG tablet Take 2 tablets by mouth every 6 hours as needed for Pain or Fever 60 tablet 0     No current facility-administered medications for this visit.       Review of Systems:   Review of Systems   Constitutional: Negative.  Negative for diaphoresis and fatigue.   HENT: Negative.     Eyes: Negative.    Respiratory: Negative.  Negative for cough, chest tightness, shortness of breath, wheezing and stridor.    Cardiovascular: Negative.  Negative for chest pain, palpitations and leg swelling.   Gastrointestinal: Negative.  Negative for blood in stool and nausea.   Genitourinary: Negative.    Musculoskeletal: Negative.    Skin: Negative.    Neurological: Negative.  Negative for dizziness, syncope, weakness and light-headedness.   Hematological: Negative.

## 2024-10-24 DIAGNOSIS — J06.9 URI WITH COUGH AND CONGESTION: ICD-10-CM

## 2024-10-25 RX ORDER — AZITHROMYCIN 250 MG/1
TABLET, FILM COATED ORAL
Qty: 6 TABLET | Refills: 1 | OUTPATIENT
Start: 2024-10-25

## 2024-11-22 ENCOUNTER — HOSPITAL ENCOUNTER (OUTPATIENT)
Dept: ULTRASOUND IMAGING | Age: 54
Discharge: HOME | End: 2024-11-22
Attending: INTERNAL MEDICINE
Payer: COMMERCIAL

## 2024-11-22 DIAGNOSIS — I65.23 BILATERAL CAROTID ARTERY STENOSIS: ICD-10-CM

## 2024-11-22 LAB
VAS LEFT CCA DIST EDV: 23.8 CM/S
VAS LEFT CCA DIST PSV: 96 CM/S
VAS LEFT CCA MID EDV: 37 CM/S
VAS LEFT CCA MID PSV: 93 CM/S
VAS LEFT CCA PROX EDV: 35 CM/S
VAS LEFT CCA PROX PSV: 105 CM/S
VAS LEFT ECA EDV: 18.2 CM/S
VAS LEFT ECA PSV: 104 CM/S
VAS LEFT ICA DIST EDV: 64.9 CM/S
VAS LEFT ICA DIST PSV: 159 CM/S
VAS LEFT ICA MID EDV: 64.9 CM/S
VAS LEFT ICA MID PSV: 162 CM/S
VAS LEFT ICA PROX EDV: 64.9 CM/S
VAS LEFT ICA PROX PSV: 156 CM/S
VAS LEFT ICA/CCA PSV: 1.7
VAS LEFT VERTEBRAL EDV: 13.7 CM/S
VAS LEFT VERTEBRAL PSV: 48.2 CM/S
VAS RIGHT CCA DIST EDV: 11.2 CM/S
VAS RIGHT CCA DIST PSV: 92.6 CM/S
VAS RIGHT CCA MID EDV: 13.7 CM/S
VAS RIGHT CCA MID PSV: 105 CM/S
VAS RIGHT CCA PROX EDV: 13 CM/S
VAS RIGHT CCA PROX PSV: 113 CM/S
VAS RIGHT ECA EDV: 25.2 CM/S
VAS RIGHT ECA PSV: 170 CM/S
VAS RIGHT ICA DIST EDV: 0 CM/S
VAS RIGHT ICA DIST PSV: 0 CM/S
VAS RIGHT ICA MID EDV: 0 CM/S
VAS RIGHT ICA MID PSV: 0 CM/S
VAS RIGHT ICA PROX EDV: 0 CM/S
VAS RIGHT ICA PROX PSV: 0 CM/S
VAS RIGHT ICA/CCA PSV: 0
VAS RIGHT VERTEBRAL EDV: 8.76 CM/S
VAS RIGHT VERTEBRAL PSV: 36.4 CM/S

## 2024-11-22 PROCEDURE — 93880 EXTRACRANIAL BILAT STUDY: CPT

## 2024-11-25 PROCEDURE — 93880 EXTRACRANIAL BILAT STUDY: CPT | Performed by: INTERNAL MEDICINE

## 2025-01-15 ENCOUNTER — OFFICE VISIT (OUTPATIENT)
Dept: OBGYN CLINIC | Age: 55
End: 2025-01-15
Payer: COMMERCIAL

## 2025-01-15 VITALS
WEIGHT: 159 LBS | HEIGHT: 64 IN | BODY MASS INDEX: 27.14 KG/M2 | DIASTOLIC BLOOD PRESSURE: 70 MMHG | SYSTOLIC BLOOD PRESSURE: 126 MMHG

## 2025-01-15 DIAGNOSIS — N95.0 PMB (POSTMENOPAUSAL BLEEDING): ICD-10-CM

## 2025-01-15 DIAGNOSIS — Z72.51 UNPROTECTED SEX: ICD-10-CM

## 2025-01-15 DIAGNOSIS — N76.0 OTHER VAGINITIS: ICD-10-CM

## 2025-01-15 DIAGNOSIS — N95.0 PMB (POSTMENOPAUSAL BLEEDING): Primary | ICD-10-CM

## 2025-01-15 LAB
BASOPHILS # BLD: 0 K/UL (ref 0–0.2)
BASOPHILS NFR BLD: 0.7 %
BILIRUBIN, POC: NORMAL
BLOOD URINE, POC: NORMAL
CLARITY, POC: NORMAL
COLOR, POC: NORMAL
EOSINOPHIL # BLD: 0.2 K/UL (ref 0–0.7)
EOSINOPHIL NFR BLD: 3.4 %
ERYTHROCYTE [DISTWIDTH] IN BLOOD BY AUTOMATED COUNT: 13.5 % (ref 11.5–14.5)
GLUCOSE URINE, POC: NORMAL MG/DL
HCT VFR BLD AUTO: 45.1 % (ref 37–47)
HGB BLD-MCNC: 14.6 G/DL (ref 12–16)
KETONES, POC: NORMAL MG/DL
LEUKOCYTE EST, POC: NORMAL
LYMPHOCYTES # BLD: 1.4 K/UL (ref 1–4.8)
LYMPHOCYTES NFR BLD: 24.1 %
MCH RBC QN AUTO: 27.6 PG (ref 27–31.3)
MCHC RBC AUTO-ENTMCNC: 32.4 % (ref 33–37)
MCV RBC AUTO: 85.3 FL (ref 79.4–94.8)
MONOCYTES # BLD: 0.3 K/UL (ref 0.2–0.8)
MONOCYTES NFR BLD: 5.1 %
NEUTROPHILS # BLD: 3.9 K/UL (ref 1.4–6.5)
NEUTS SEG NFR BLD: 66.5 %
NITRITE, POC: NORMAL
PH, POC: 6
PLATELET # BLD AUTO: 278 K/UL (ref 130–400)
PROTEIN, POC: NORMAL MG/DL
RBC # BLD AUTO: 5.29 M/UL (ref 4.2–5.4)
SPECIFIC GRAVITY, POC: 1.02
TSH REFLEX: 1.85 UIU/ML (ref 0.44–3.86)
UROBILINOGEN, POC: NORMAL MG/DL
WBC # BLD AUTO: 5.9 K/UL (ref 4.8–10.8)

## 2025-01-15 PROCEDURE — 3017F COLORECTAL CA SCREEN DOC REV: CPT | Performed by: OBSTETRICS & GYNECOLOGY

## 2025-01-15 PROCEDURE — G8427 DOCREV CUR MEDS BY ELIG CLIN: HCPCS | Performed by: OBSTETRICS & GYNECOLOGY

## 2025-01-15 PROCEDURE — 99213 OFFICE O/P EST LOW 20 MIN: CPT | Performed by: OBSTETRICS & GYNECOLOGY

## 2025-01-15 PROCEDURE — 1036F TOBACCO NON-USER: CPT | Performed by: OBSTETRICS & GYNECOLOGY

## 2025-01-15 PROCEDURE — G8419 CALC BMI OUT NRM PARAM NOF/U: HCPCS | Performed by: OBSTETRICS & GYNECOLOGY

## 2025-01-15 PROCEDURE — 81002 URINALYSIS NONAUTO W/O SCOPE: CPT | Performed by: OBSTETRICS & GYNECOLOGY

## 2025-01-15 SDOH — ECONOMIC STABILITY: FOOD INSECURITY: WITHIN THE PAST 12 MONTHS, THE FOOD YOU BOUGHT JUST DIDN'T LAST AND YOU DIDN'T HAVE MONEY TO GET MORE.: NEVER TRUE

## 2025-01-15 SDOH — ECONOMIC STABILITY: FOOD INSECURITY: WITHIN THE PAST 12 MONTHS, YOU WORRIED THAT YOUR FOOD WOULD RUN OUT BEFORE YOU GOT MONEY TO BUY MORE.: NEVER TRUE

## 2025-01-15 ASSESSMENT — ENCOUNTER SYMPTOMS
ALLERGIC/IMMUNOLOGIC NEGATIVE: 1
DIARRHEA: 0
NAUSEA: 0
RECTAL PAIN: 0
CONSTIPATION: 0
ABDOMINAL PAIN: 0
VOMITING: 0
EYES NEGATIVE: 1
ANAL BLEEDING: 0
BLOOD IN STOOL: 0
ABDOMINAL DISTENTION: 0
RESPIRATORY NEGATIVE: 1

## 2025-01-15 ASSESSMENT — PATIENT HEALTH QUESTIONNAIRE - PHQ9
1. LITTLE INTEREST OR PLEASURE IN DOING THINGS: NOT AT ALL
2. FEELING DOWN, DEPRESSED OR HOPELESS: NOT AT ALL
SUM OF ALL RESPONSES TO PHQ9 QUESTIONS 1 & 2: 0
SUM OF ALL RESPONSES TO PHQ QUESTIONS 1-9: 0

## 2025-01-15 NOTE — PATIENT INSTRUCTIONS
been done to diagnose or treat fertility problems. It can also be used to diagnose or treat abnormal bleeding.  You may have cramps, discharge, or light vaginal bleeding for several days after the test. This may last longer if the hysteroscopy was used for treatment. If the doctor filled your uterus with air, your belly may feel full. You may also have shoulder pain right after the procedure.  This care sheet gives you a general idea about how long it will take for you to recover. But each person recovers at a different pace. Follow the steps below to get better as quickly as possible.  How can you care for yourself at home?  Activity    Rest when you feel tired.     You can do your normal activities when it feels okay to do so.     You may shower and take baths as usual.     Ask your doctor when it is okay for you to have sex.   Diet    You can eat your normal diet. If your stomach is upset, try bland, low-fat foods like plain rice, broiled chicken, toast, and yogurt.     If your bowel movements are not regular right after surgery, try to avoid constipation and straining. Drink plenty of water. Your doctor may suggest fiber, a stool softener, or a mild laxative.   Medicines    Your doctor will tell you if and when you can restart your medicines. You will also be given instructions about taking any new medicines.     If you take aspirin or some other blood thinner, be sure to talk to your doctor. Your doctor will tell you if and when to start taking this medicine again. Make sure that you understand exactly what your doctor wants you to do.     Be safe with medicines. Take pain medicines exactly as directed.  If the doctor gave you a prescription medicine for pain, take it as prescribed.  If you are not taking a prescription pain medicine, ask your doctor if you can take an over-the-counter medicine.  Do not take two or more pain medicines at the same time unless the doctor told you to. Many pain medicines have

## 2025-01-16 LAB
CLUE CELLS VAG QL WET PREP: NORMAL
T VAGINALIS VAG QL WET PREP: NORMAL
TRICHOMONAS VAGINALIS SCREEN: NEGATIVE
YEAST VAG QL WET PREP: NORMAL

## 2025-01-20 LAB
C TRACH DNA CVX QL NAA+PROBE: NEGATIVE
N GONORRHOEA DNA CERV MUCUS QL NAA+PROBE: NEGATIVE

## 2025-01-24 ENCOUNTER — OFFICE VISIT (OUTPATIENT)
Dept: CARDIOLOGY CLINIC | Age: 55
End: 2025-01-24

## 2025-01-24 VITALS
HEART RATE: 73 BPM | DIASTOLIC BLOOD PRESSURE: 68 MMHG | WEIGHT: 158.8 LBS | SYSTOLIC BLOOD PRESSURE: 116 MMHG | BODY MASS INDEX: 27.26 KG/M2 | OXYGEN SATURATION: 98 %

## 2025-01-24 DIAGNOSIS — Z98.890 HISTORY OF LEFT COMMON CAROTID ARTERY STENT PLACEMENT: ICD-10-CM

## 2025-01-24 DIAGNOSIS — I10 HYPERTENSION, UNSPECIFIED TYPE: ICD-10-CM

## 2025-01-24 DIAGNOSIS — E78.5 HYPERLIPIDEMIA, UNSPECIFIED HYPERLIPIDEMIA TYPE: ICD-10-CM

## 2025-01-24 DIAGNOSIS — I25.10 CORONARY ARTERY DISEASE INVOLVING NATIVE CORONARY ARTERY OF NATIVE HEART WITHOUT ANGINA PECTORIS: ICD-10-CM

## 2025-01-24 DIAGNOSIS — I65.23 BILATERAL CAROTID ARTERY STENOSIS: ICD-10-CM

## 2025-01-24 DIAGNOSIS — G45.9 TIA (TRANSIENT ISCHEMIC ATTACK): ICD-10-CM

## 2025-01-24 DIAGNOSIS — I10 ESSENTIAL HYPERTENSION: Primary | ICD-10-CM

## 2025-01-24 DIAGNOSIS — I65.22 STENOSIS OF LEFT CAROTID ARTERY: ICD-10-CM

## 2025-01-24 DIAGNOSIS — E78.5 DYSLIPIDEMIA: ICD-10-CM

## 2025-01-24 DIAGNOSIS — Z95.828 HISTORY OF LEFT COMMON CAROTID ARTERY STENT PLACEMENT: ICD-10-CM

## 2025-01-24 DIAGNOSIS — I21.11 ST ELEVATION MYOCARDIAL INFARCTION INVOLVING RIGHT CORONARY ARTERY (HCC): ICD-10-CM

## 2025-01-24 RX ORDER — CHLORAL HYDRATE 500 MG
1000 CAPSULE ORAL 2 TIMES DAILY
Qty: 180 CAPSULE | Refills: 3 | Status: SHIPPED | OUTPATIENT
Start: 2025-01-24

## 2025-01-24 ASSESSMENT — ENCOUNTER SYMPTOMS
STRIDOR: 0
COUGH: 0
CHEST TIGHTNESS: 0
SHORTNESS OF BREATH: 0
BLOOD IN STOOL: 0
NAUSEA: 0
EYES NEGATIVE: 1
RESPIRATORY NEGATIVE: 1
GASTROINTESTINAL NEGATIVE: 1
WHEEZING: 0

## 2025-01-24 NOTE — PROGRESS NOTES
OFFICE VISIT        Patient: Heather Proctor  YOB: 1970  MRN: [id number]    Chief Complaint: STEMI HTN HPL   Chief Complaint   Patient presents with    Follow-up     4 month       CV Data:  10/21 LE Art - negative   8/221 Echo EF 55-60 Trace MR  8/25/21 STEMI RCA Px ABELARDO.  CX distal 70-75%   11/21 CUS % LICA >90   11/15/21 Left Carotid stent. %  11/22 CUS LICA stent Patent. %   7/23 SPECT Lat and Inferior scar. No ischemia.   11/23 CUS LICA Stent patent. %   11/24 CUS  LICA Stent patent. %     Subjective/HPI Dr. Luna's pt changing due to insurance. Pt has had rare pressure when working as a . These symptoms are different than her MI symptoms.  Compliant with meds.  Pt was taken off statin for unknown reason.     11/12/21 asymptomatic but has severe carotid dz. LICA >90% and LIU occluded. occ chest heaviness. No sob.     11/24/21 had LICA Stent did well. Recent HTN and went to ER. Has been stable. No more headaches. No cp some sob but no worse.       11//30/21 called in due to uncontrolled BP worsea t night. No cp but occ heavy feeling. No sob. Little puffy under her eye. No bleed.     12/27/21 in ER for Stroke like symptoms. No cp no sob no falls no bleed    2/23/22 still has rash thought to be related to HCTZ.  BP running low little woozy. No cp no sob no falls no bleed    5/23/22 doing better no cp no sob takes meds.     9/27/22 doing well no cp no sob rare LH no falls no bleed.     1/24/23 doing well walking no cp no sob no falls no bleed. 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     5/23/23 doing well no cp no sob occ Low BP low and symptomatic. LDL 75     9/18/23 last week has couple days of vertigo. Now resolved. Her BP ws little high during those days. No cp no sob active no edema no bleed.     10/27/23 ER 3 days ago due to, again, dizziness and High /104 chest burning. Feels well now. No meds were given in ER. BP lowered on its

## 2025-01-28 ENCOUNTER — HOSPITAL ENCOUNTER (OUTPATIENT)
Dept: ULTRASOUND IMAGING | Age: 55
Discharge: HOME | End: 2025-01-30
Attending: OBSTETRICS & GYNECOLOGY
Payer: COMMERCIAL

## 2025-01-28 DIAGNOSIS — N95.0 PMB (POSTMENOPAUSAL BLEEDING): ICD-10-CM

## 2025-01-28 PROCEDURE — 93975 VASCULAR STUDY: CPT

## 2025-01-28 PROCEDURE — 76830 TRANSVAGINAL US NON-OB: CPT

## 2025-01-28 PROCEDURE — 76856 US EXAM PELVIC COMPLETE: CPT

## 2025-02-13 ENCOUNTER — OFFICE VISIT (OUTPATIENT)
Dept: OBGYN CLINIC | Age: 55
End: 2025-02-13
Payer: COMMERCIAL

## 2025-02-13 VITALS
WEIGHT: 158 LBS | HEIGHT: 64 IN | DIASTOLIC BLOOD PRESSURE: 68 MMHG | BODY MASS INDEX: 26.98 KG/M2 | SYSTOLIC BLOOD PRESSURE: 118 MMHG

## 2025-02-13 DIAGNOSIS — N95.0 PMB (POSTMENOPAUSAL BLEEDING): Primary | ICD-10-CM

## 2025-02-13 PROCEDURE — 99213 OFFICE O/P EST LOW 20 MIN: CPT | Performed by: OBSTETRICS & GYNECOLOGY

## 2025-02-13 PROCEDURE — 1036F TOBACCO NON-USER: CPT | Performed by: OBSTETRICS & GYNECOLOGY

## 2025-02-13 PROCEDURE — G8419 CALC BMI OUT NRM PARAM NOF/U: HCPCS | Performed by: OBSTETRICS & GYNECOLOGY

## 2025-02-13 PROCEDURE — G8427 DOCREV CUR MEDS BY ELIG CLIN: HCPCS | Performed by: OBSTETRICS & GYNECOLOGY

## 2025-02-13 PROCEDURE — 3017F COLORECTAL CA SCREEN DOC REV: CPT | Performed by: OBSTETRICS & GYNECOLOGY

## 2025-02-13 ASSESSMENT — ENCOUNTER SYMPTOMS
ABDOMINAL DISTENTION: 0
ALLERGIC/IMMUNOLOGIC NEGATIVE: 1
CONSTIPATION: 0
ABDOMINAL PAIN: 0
RECTAL PAIN: 0
DIARRHEA: 0
EYES NEGATIVE: 1
RESPIRATORY NEGATIVE: 1
NAUSEA: 0
BLOOD IN STOOL: 0
ANAL BLEEDING: 0
VOMITING: 0

## 2025-02-13 NOTE — PROGRESS NOTES
Patient here for US results for light PM spotting x 1. Reviewed medical, surgical, social and family history.  Also reviewed current medications and allergies. Spotting has not recurred.  Vaginal cx and labs normal.  US as follows:    US Result (most recent):  US NON OB TRANSVAGINAL 01/28/2025    Narrative  EXAMINATION:  PELVIC ULTRASOUND; DOPPLER EVALUATION OF THE PELVIS    1/28/2025    TECHNIQUE:  Transabdominal and transvaginal pelvic duplex ultrasound using B-mode/gray  scaled imaging, Doppler spectral analysis and color flow Doppler was obtained.    COMPARISON:  Ultrasound pelvis from April 7, 2019    HISTORY:  ORDERING SYSTEM PROVIDED HISTORY: PMB (postmenopausal bleeding)  TECHNOLOGIST PROVIDED HISTORY:  What reading provider will be dictating this exam?->CRC    FINDINGS:    Measurements:    Uterus: 5.9 x 3.9 x 2.0 cm    Endometrial stripe: 2 mm    Right Ovary:3.0 x 1.4 x 1.2 cm    Left Ovary: 2.0 x 1.1 x 1.8 cm      Ultrasound Findings:    Uterus: Slightly heterogeneous appearing uterus.  12 mm left uterine fibroid.  Several nabothian cysts noted.    Endometrial stripe: Endometrial stripe is within normal limits.  Normal  endometrial thickness at 2 mm.    Right Ovary: Right ovary is within normal limits.  There is normal arterial  and venous Doppler flow.  No right adnexal mass.    Left Ovary:  Left ovary is within normal limits. There is normal arterial and  venous Doppler flow.  No left adnexal mass.    Free Fluid: No evidence of free fluid.    Impression  1.  Heterogeneous appearing uterus with an intrauterine fibroid on the left  measuring 12 mm.  Normal appearance of the endometrium.  Endometrial  thickness was 2 mm which is considered within normal limits for a  postmenopausal female that is experiencing vaginal bleeding.    2.  Normal appearance of the bilateral ovaries.  There are no adnexal masses.  Normal Doppler flow within the ovaries.    RECOMMENDATIONS:  Given the provided history, suggest

## 2025-03-18 ENCOUNTER — TELEPHONE (OUTPATIENT)
Dept: OBGYN CLINIC | Age: 55
End: 2025-03-18

## 2025-03-18 DIAGNOSIS — Z12.31 SCREENING MAMMOGRAM FOR BREAST CANCER: Primary | ICD-10-CM

## 2025-03-18 NOTE — TELEPHONE ENCOUNTER
Received a call from the patient requesting if possible that provider may place a mammogram order.Patient made aware a message will be sent to the medical staff.

## 2025-03-25 ENCOUNTER — APPOINTMENT (OUTPATIENT)
Dept: PRIMARY CARE | Facility: CLINIC | Age: 55
End: 2025-03-25
Payer: COMMERCIAL

## 2025-03-25 VITALS
SYSTOLIC BLOOD PRESSURE: 124 MMHG | BODY MASS INDEX: 27.14 KG/M2 | OXYGEN SATURATION: 99 % | WEIGHT: 159 LBS | HEIGHT: 64 IN | DIASTOLIC BLOOD PRESSURE: 64 MMHG | HEART RATE: 71 BPM | TEMPERATURE: 98.4 F

## 2025-03-25 DIAGNOSIS — N95.0 POSTMENOPAUSAL BLEEDING: ICD-10-CM

## 2025-03-25 DIAGNOSIS — I65.21 OCCLUSION OF RIGHT CAROTID ARTERY: ICD-10-CM

## 2025-03-25 DIAGNOSIS — I10 PRIMARY HYPERTENSION: Primary | ICD-10-CM

## 2025-03-25 DIAGNOSIS — I25.10 CORONARY ARTERY DISEASE INVOLVING NATIVE HEART WITHOUT ANGINA PECTORIS, UNSPECIFIED VESSEL OR LESION TYPE: ICD-10-CM

## 2025-03-25 DIAGNOSIS — R73.01 IFG (IMPAIRED FASTING GLUCOSE): ICD-10-CM

## 2025-03-25 DIAGNOSIS — I65.22 STENOSIS OF LEFT CAROTID ARTERY: ICD-10-CM

## 2025-03-25 DIAGNOSIS — E78.2 MIXED HYPERLIPIDEMIA: ICD-10-CM

## 2025-03-25 DIAGNOSIS — Z12.11 ENCOUNTER FOR SCREENING FOR MALIGNANT NEOPLASM OF COLON: ICD-10-CM

## 2025-03-25 PROCEDURE — 3078F DIAST BP <80 MM HG: CPT | Performed by: FAMILY MEDICINE

## 2025-03-25 PROCEDURE — 99214 OFFICE O/P EST MOD 30 MIN: CPT | Performed by: FAMILY MEDICINE

## 2025-03-25 PROCEDURE — 3074F SYST BP LT 130 MM HG: CPT | Performed by: FAMILY MEDICINE

## 2025-03-25 PROCEDURE — 3008F BODY MASS INDEX DOCD: CPT | Performed by: FAMILY MEDICINE

## 2025-03-25 ASSESSMENT — PATIENT HEALTH QUESTIONNAIRE - PHQ9
1. LITTLE INTEREST OR PLEASURE IN DOING THINGS: NOT AT ALL
2. FEELING DOWN, DEPRESSED OR HOPELESS: NOT AT ALL
SUM OF ALL RESPONSES TO PHQ9 QUESTIONS 1 AND 2: 0

## 2025-03-25 NOTE — PROGRESS NOTES
"Subjective   Patient ID: Maria Fernanda Glasgow is a 54 y.o. female who presents for Follow-up.    HPI     Declines Covid shot.     Patient states she only had mild vaginal spotting.  Saw gyn and had pelvic US 1/28/2025.  Nu further spotting.  Patient is to have a repeat pelvic US in 3-6 months.        Review labs: 03/20/2025  Mammo: 03/26/2024- Due       Pt has HTN.  She occasionally checks her BP at home with similar readings to office.  Patient reports walking 3x a week.     Patient has hyperlipidemia.  She tries to limit the amount of fatty foods and high cholesterol foods that are consumed.  Patient is compliant with stain therapy and denies any noted side effects.     Patient quit smoking after her MI on 8/25/21.     Patient reports being irritable and exhausted. She is unsure if this is caused by her work schedule.     Review of Systems  Constitutional: Patient denies any fever, chills, loss of appetite, or unexplained weight loss.  Cardiovascular: Patient denies any chest pain, shortness of breath with exertion, tachycardia, palpitations, orthopnea, or paroxysmal nocturnal dyspnea.  Respiratory: Patient denies any cough, shortness of breath, or wheezing.  Gastrointestinal: Patient denies any nausea, vomiting, diarrhea, constipation, melena, hematochezia, or reflux symptoms  Skin: Denies any rashes or skin lesions  Neurology: Patient denies any new motor or sensory losses. Denies any numbness, tingling, weakness, and incoordination of the extremities. Patient also denies any tremor, seizures, or gait instability.  Endocrinology: Denies any polyuria, polydipsia, polyphagia, or heat/cold intolerance.      Objective   /77 (BP Location: Right arm, Patient Position: Sitting, BP Cuff Size: Small adult)   Pulse 71   Temp 36.9 °C (98.4 °F) (Temporal)   Ht 1.626 m (5' 4\")   Wt 72.1 kg (159 lb)   SpO2 99%   BMI 27.29 kg/m²     Physical Exam  General Appearance: Alert and cooperative, in no acute distress, " well-developed/well-nourished overweight female.    Neck: Supple and without adenopathy or rigidity. There is no JVD at 90° and no carotid bruits are noted. There is no thyromegaly, thyroid tenderness, or palpable thyroid nodules.  Heart: Regular rate and rhythm without murmur or ectopy.  Lungs: Clear to auscultation bilaterally with good air exchange.  Skin: Good turgor, moist, warm and without rashes or lesions.  Neurological exam: Alert and oriented ×3, no tremor, normal gait.  Extremities: No clubbing, cyanosis, or edema      Assessment/Plan     HTN:   Stable based on in office readings.  Blood pressure appears adequately controlled and we will continue with the current antihypertensive therapy.     Hyperlipidemia:   Stable based on last labs.   Continue with the current medication.   Dietary changes, exercise, and maintenance of healthy weight were discussed at length.    Coronary artery disease:   Stable based on symptoms.  Patient is without worrisome signs or symptoms for unstable angina.   Risk factor modification was discussed at length.   Dietary changes, exercise and maintenance of a healthy weight were discussed.  Patient had a MI 8/25/21 and underwent 2 stents (Dr. Parra).  She follows with Dr. Jackson (cardiology) at [facility] regularly as well.     Left carotid artery stenosis / Right carotid artery occlusion:   She had a LEFT carotid artery stent on 11/15/21 and the RIGHT carotid artery was found to be 100% blocked.  Will need continued monitoring and continued aggressive treatment of the BP/lipids     IFG:  Stable based on labs.  Dietary changes, exercise, and maintenance of a healthy weight were discussed at length.  3/20/2025 A1c of 6.1 (was 6.2)     Encounter for screening for malignant neoplasm of colon:  Ordered colonoscopy for cancer screening.     Postmenopausal bleeding:  Had a pelvic US 1/28/2025 that revealed a fibroid but was otherwise normal.  To have a repeat US in 3-6  months.  Pt was referred her to Dr. Ferrara (ob/gyn) as her current ob/gyn retired.     Encounter for screening mammogram for breast cancer:  Ordered mammogram screening.     LAST COLONOSCOPY DONE BEFORE 2013  MAMM DUE 3/27/2025      Scribe Attestation  By signing my name below, I, Marleen Morales , Lauren   attest that this documentation has been prepared under the direction and in the presence of Kavon Taylor DO.    Orders Placed This Encounter   Procedures    BI mammo bilateral screening tomosynthesis    Hemoglobin A1C    Basic Metabolic Panel    Lipid Panel    Referral to Gynecology

## 2025-03-25 NOTE — PATIENT INSTRUCTIONS
Follow up in 6 months with labs to be done PRIOR.    It was a pleasure to see you today. Thank you for choosing us for your health care needs.    If you have lab or other testing completed and have not been informed of results within one week, please call the office for your results.    If you haven't done so, consider signing up for [facility] [place], the [facility] personal health record. Ask the staff how you can get started.

## 2025-04-10 ENCOUNTER — HOSPITAL ENCOUNTER (OUTPATIENT)
Dept: WOMENS IMAGING | Age: 55
Discharge: HOME | End: 2025-04-12
Attending: OBSTETRICS & GYNECOLOGY
Payer: COMMERCIAL

## 2025-04-10 DIAGNOSIS — Z12.31 SCREENING MAMMOGRAM FOR BREAST CANCER: ICD-10-CM

## 2025-04-10 PROCEDURE — 77063 BREAST TOMOSYNTHESIS BI: CPT

## 2025-04-14 ENCOUNTER — TELEPHONE (OUTPATIENT)
Dept: GASTROENTEROLOGY | Facility: CLINIC | Age: 55
End: 2025-04-14
Payer: COMMERCIAL

## 2025-04-14 NOTE — TELEPHONE ENCOUNTER
Unable to reach patient by phone to schedule colonoscopy ordered by Dr. Taylor.  Voice mail has not been set up.  We will send a letter to patient.

## 2025-05-27 ENCOUNTER — OFFICE VISIT (OUTPATIENT)
Age: 55
End: 2025-05-27
Payer: COMMERCIAL

## 2025-05-27 VITALS
DIASTOLIC BLOOD PRESSURE: 70 MMHG | SYSTOLIC BLOOD PRESSURE: 122 MMHG | WEIGHT: 159 LBS | OXYGEN SATURATION: 98 % | HEART RATE: 73 BPM | BODY MASS INDEX: 27.29 KG/M2

## 2025-05-27 DIAGNOSIS — I10 HYPERTENSION, UNSPECIFIED TYPE: Primary | ICD-10-CM

## 2025-05-27 PROCEDURE — 3078F DIAST BP <80 MM HG: CPT | Performed by: INTERNAL MEDICINE

## 2025-05-27 PROCEDURE — 99214 OFFICE O/P EST MOD 30 MIN: CPT | Performed by: INTERNAL MEDICINE

## 2025-05-27 PROCEDURE — 3074F SYST BP LT 130 MM HG: CPT | Performed by: INTERNAL MEDICINE

## 2025-05-27 PROCEDURE — 3017F COLORECTAL CA SCREEN DOC REV: CPT | Performed by: INTERNAL MEDICINE

## 2025-05-27 PROCEDURE — G8419 CALC BMI OUT NRM PARAM NOF/U: HCPCS | Performed by: INTERNAL MEDICINE

## 2025-05-27 PROCEDURE — G8427 DOCREV CUR MEDS BY ELIG CLIN: HCPCS | Performed by: INTERNAL MEDICINE

## 2025-05-27 PROCEDURE — 1036F TOBACCO NON-USER: CPT | Performed by: INTERNAL MEDICINE

## 2025-05-27 RX ORDER — ATORVASTATIN CALCIUM 80 MG/1
40 TABLET, FILM COATED ORAL NIGHTLY
Qty: 90 TABLET | Refills: 3
Start: 2025-05-27

## 2025-05-27 ASSESSMENT — ENCOUNTER SYMPTOMS
NAUSEA: 0
SHORTNESS OF BREATH: 0
RESPIRATORY NEGATIVE: 1
EYES NEGATIVE: 1
CHEST TIGHTNESS: 0
STRIDOR: 0
BLOOD IN STOOL: 0
GASTROINTESTINAL NEGATIVE: 1
WHEEZING: 0
COUGH: 0

## 2025-05-27 NOTE — PROGRESS NOTES
03/20/2025 03:01 PM    MPV 10.1 04/08/2015 12:00 AM     Lipids:  Lab Results   Component Value Date    CHOL 133 03/20/2025    CHOL 152 09/25/2024    CHOL 138 05/14/2024     Lab Results   Component Value Date    TRIG 116 03/20/2025    TRIG 174 (H) 09/25/2024    TRIG 130 05/14/2024     Lab Results   Component Value Date    HDL 36 (L) 03/20/2025    HDL 45 09/25/2024    HDL 36 (L) 05/14/2024     No components found for: \"LDLCHOLESTEROL\", \"LDLCALC\"    No results found for: \"VLDL\"  No results found for: \"CHOLHDLRATIO\"  CMP:    Lab Results   Component Value Date/Time     03/20/2025 03:01 PM    K 4.3 03/20/2025 03:01 PM     03/20/2025 03:01 PM    CO2 25 03/20/2025 03:01 PM    BUN 13 03/20/2025 03:01 PM    CREATININE 0.74 03/20/2025 03:01 PM    GFRAA >60.0 09/26/2022 03:01 PM    LABGLOM >90.0 03/20/2025 03:01 PM    LABGLOM >60.0 10/24/2023 03:30 PM    GLUCOSE 94 03/20/2025 03:01 PM    CALCIUM 9.4 03/20/2025 03:01 PM    BILITOT 0.4 03/20/2025 03:01 PM    ALKPHOS 87 03/20/2025 03:01 PM    AST 21 03/20/2025 03:01 PM    ALT 32 03/20/2025 03:01 PM     BMP:    Lab Results   Component Value Date/Time     03/20/2025 03:01 PM    K 4.3 03/20/2025 03:01 PM     03/20/2025 03:01 PM    CO2 25 03/20/2025 03:01 PM    BUN 13 03/20/2025 03:01 PM    CREATININE 0.74 03/20/2025 03:01 PM    CALCIUM 9.4 03/20/2025 03:01 PM    GFRAA >60.0 09/26/2022 03:01 PM    LABGLOM >90.0 03/20/2025 03:01 PM    LABGLOM >60.0 10/24/2023 03:30 PM    GLUCOSE 94 03/20/2025 03:01 PM     Magnesium:    Lab Results   Component Value Date/Time    MG 2.3 05/14/2024 02:14 PM     TSH:  Lab Results   Component Value Date    TSH 1.850 01/15/2025             Patient Active Problem List   Diagnosis    Diverticulosis    Irregular periods    Uterine leiomyoma    Acne    Acute gastritis    Generalized abdominal pain    DUB (dysfunctional uterine bleeding)    Tobacco abuse    History of acute myocardial infarction    Carotid occlusion, right

## 2025-06-02 ENCOUNTER — HOSPITAL ENCOUNTER (OUTPATIENT)
Dept: ULTRASOUND IMAGING | Age: 55
Discharge: HOME | End: 2025-06-04
Attending: OBSTETRICS & GYNECOLOGY
Payer: COMMERCIAL

## 2025-06-02 DIAGNOSIS — N95.0 PMB (POSTMENOPAUSAL BLEEDING): ICD-10-CM

## 2025-06-02 PROCEDURE — 93975 VASCULAR STUDY: CPT

## 2025-06-02 PROCEDURE — 76856 US EXAM PELVIC COMPLETE: CPT

## 2025-06-02 PROCEDURE — 76830 TRANSVAGINAL US NON-OB: CPT

## 2025-06-04 ENCOUNTER — TELEPHONE (OUTPATIENT)
Dept: PRIMARY CARE | Facility: CLINIC | Age: 55
End: 2025-06-04
Payer: COMMERCIAL

## 2025-06-04 NOTE — TELEPHONE ENCOUNTER
Patient phoned the office to make you aware she had an ultrasound through [facility] on 06- (pulled result for review into  chart).

## 2025-06-05 ENCOUNTER — RESULTS FOLLOW-UP (OUTPATIENT)
Dept: OBGYN CLINIC | Age: 55
End: 2025-06-05

## 2025-09-30 ENCOUNTER — APPOINTMENT (OUTPATIENT)
Dept: PRIMARY CARE | Facility: CLINIC | Age: 55
End: 2025-09-30
Payer: COMMERCIAL